# Patient Record
Sex: FEMALE | Race: WHITE | NOT HISPANIC OR LATINO | Employment: OTHER | URBAN - METROPOLITAN AREA
[De-identification: names, ages, dates, MRNs, and addresses within clinical notes are randomized per-mention and may not be internally consistent; named-entity substitution may affect disease eponyms.]

---

## 2017-01-03 ENCOUNTER — HOSPITAL ENCOUNTER (OUTPATIENT)
Dept: RADIOLOGY | Facility: MEDICAL CENTER | Age: 72
Discharge: HOME | End: 2017-01-03
Payer: COMMERCIAL

## 2017-01-03 DIAGNOSIS — Z12.31 ENCOUNTER FOR SCREENING MAMMOGRAM FOR MALIGNANT NEOPLASM OF BREAST: ICD-10-CM

## 2017-01-03 PROCEDURE — G0202 SCR MAMMO BI INCL CAD: HCPCS

## 2017-02-22 ENCOUNTER — ALLSCRIPTS OFFICE VISIT (OUTPATIENT)
Dept: OTHER | Facility: OTHER | Age: 72
End: 2017-02-22

## 2017-02-22 DIAGNOSIS — E78.2 MIXED HYPERLIPIDEMIA: ICD-10-CM

## 2017-02-22 DIAGNOSIS — I10 ESSENTIAL (PRIMARY) HYPERTENSION: ICD-10-CM

## 2017-02-22 DIAGNOSIS — R73.01 IMPAIRED FASTING GLUCOSE: ICD-10-CM

## 2017-02-22 DIAGNOSIS — E55.9 VITAMIN D DEFICIENCY: ICD-10-CM

## 2017-02-22 DIAGNOSIS — N32.81 OVERACTIVE BLADDER: ICD-10-CM

## 2017-02-27 ENCOUNTER — APPOINTMENT (OUTPATIENT)
Dept: LAB | Facility: CLINIC | Age: 72
End: 2017-02-27
Payer: COMMERCIAL

## 2017-02-27 DIAGNOSIS — R73.01 IMPAIRED FASTING GLUCOSE: ICD-10-CM

## 2017-02-27 DIAGNOSIS — N32.81 OVERACTIVE BLADDER: ICD-10-CM

## 2017-02-27 DIAGNOSIS — E78.2 MIXED HYPERLIPIDEMIA: ICD-10-CM

## 2017-02-27 DIAGNOSIS — E55.9 VITAMIN D DEFICIENCY: ICD-10-CM

## 2017-02-27 DIAGNOSIS — I10 ESSENTIAL (PRIMARY) HYPERTENSION: ICD-10-CM

## 2017-02-27 LAB
25(OH)D3 SERPL-MCNC: 24 NG/ML (ref 30–100)
ALBUMIN SERPL BCP-MCNC: 3.8 G/DL (ref 3.5–5)
ALP SERPL-CCNC: 102 U/L (ref 46–116)
ALT SERPL W P-5'-P-CCNC: 78 U/L (ref 12–78)
ANION GAP SERPL CALCULATED.3IONS-SCNC: 8 MMOL/L (ref 4–13)
AST SERPL W P-5'-P-CCNC: 47 U/L (ref 5–45)
BASOPHILS # BLD AUTO: 0.03 THOUSANDS/ΜL (ref 0–0.1)
BASOPHILS NFR BLD AUTO: 0 % (ref 0–1)
BILIRUB SERPL-MCNC: 0.62 MG/DL (ref 0.2–1)
BUN SERPL-MCNC: 16 MG/DL (ref 5–25)
CALCIUM SERPL-MCNC: 9.2 MG/DL (ref 8.3–10.1)
CHLORIDE SERPL-SCNC: 105 MMOL/L (ref 100–108)
CHOLEST SERPL-MCNC: 192 MG/DL (ref 50–200)
CO2 SERPL-SCNC: 27 MMOL/L (ref 21–32)
CREAT SERPL-MCNC: 0.7 MG/DL (ref 0.6–1.3)
EOSINOPHIL # BLD AUTO: 0.3 THOUSAND/ΜL (ref 0–0.61)
EOSINOPHIL NFR BLD AUTO: 4 % (ref 0–6)
ERYTHROCYTE [DISTWIDTH] IN BLOOD BY AUTOMATED COUNT: 14.3 % (ref 11.6–15.1)
EST. AVERAGE GLUCOSE BLD GHB EST-MCNC: 146 MG/DL
GFR SERPL CREATININE-BSD FRML MDRD: >60 ML/MIN/1.73SQ M
GLUCOSE SERPL-MCNC: 130 MG/DL (ref 65–140)
HBA1C MFR BLD: 6.7 % (ref 4.2–6.3)
HCT VFR BLD AUTO: 45.4 % (ref 34.8–46.1)
HDLC SERPL-MCNC: 50 MG/DL (ref 40–60)
HGB BLD-MCNC: 14.7 G/DL (ref 11.5–15.4)
LDLC SERPL CALC-MCNC: 110 MG/DL (ref 0–100)
LYMPHOCYTES # BLD AUTO: 2.97 THOUSANDS/ΜL (ref 0.6–4.47)
LYMPHOCYTES NFR BLD AUTO: 41 % (ref 14–44)
MCH RBC QN AUTO: 30.2 PG (ref 26.8–34.3)
MCHC RBC AUTO-ENTMCNC: 32.4 G/DL (ref 31.4–37.4)
MCV RBC AUTO: 93 FL (ref 82–98)
MONOCYTES # BLD AUTO: 0.8 THOUSAND/ΜL (ref 0.17–1.22)
MONOCYTES NFR BLD AUTO: 11 % (ref 4–12)
NEUTROPHILS # BLD AUTO: 3.11 THOUSANDS/ΜL (ref 1.85–7.62)
NEUTS SEG NFR BLD AUTO: 44 % (ref 43–75)
NRBC BLD AUTO-RTO: 0 /100 WBCS
PLATELET # BLD AUTO: 179 THOUSANDS/UL (ref 149–390)
PMV BLD AUTO: 12.9 FL (ref 8.9–12.7)
POTASSIUM SERPL-SCNC: 4 MMOL/L (ref 3.5–5.3)
PROT SERPL-MCNC: 7.4 G/DL (ref 6.4–8.2)
RBC # BLD AUTO: 4.86 MILLION/UL (ref 3.81–5.12)
SODIUM SERPL-SCNC: 140 MMOL/L (ref 136–145)
TRIGL SERPL-MCNC: 160 MG/DL
TSH SERPL DL<=0.05 MIU/L-ACNC: 2.38 UIU/ML (ref 0.36–3.74)
WBC # BLD AUTO: 7.22 THOUSAND/UL (ref 4.31–10.16)

## 2017-02-27 PROCEDURE — 84443 ASSAY THYROID STIM HORMONE: CPT

## 2017-02-27 PROCEDURE — 83036 HEMOGLOBIN GLYCOSYLATED A1C: CPT

## 2017-02-27 PROCEDURE — 82306 VITAMIN D 25 HYDROXY: CPT

## 2017-02-27 PROCEDURE — 80053 COMPREHEN METABOLIC PANEL: CPT

## 2017-02-27 PROCEDURE — 80061 LIPID PANEL: CPT

## 2017-02-27 PROCEDURE — 85025 COMPLETE CBC W/AUTO DIFF WBC: CPT

## 2017-02-27 PROCEDURE — 36415 COLL VENOUS BLD VENIPUNCTURE: CPT

## 2017-02-28 ENCOUNTER — GENERIC CONVERSION - ENCOUNTER (OUTPATIENT)
Dept: OTHER | Facility: OTHER | Age: 72
End: 2017-02-28

## 2017-04-21 ENCOUNTER — GENERIC CONVERSION - ENCOUNTER (OUTPATIENT)
Dept: OTHER | Facility: OTHER | Age: 72
End: 2017-04-21

## 2017-05-05 ENCOUNTER — GENERIC CONVERSION - ENCOUNTER (OUTPATIENT)
Dept: OTHER | Facility: OTHER | Age: 72
End: 2017-05-05

## 2017-06-07 ENCOUNTER — GENERIC CONVERSION - ENCOUNTER (OUTPATIENT)
Dept: OTHER | Facility: OTHER | Age: 72
End: 2017-06-07

## 2017-06-10 ENCOUNTER — APPOINTMENT (EMERGENCY)
Dept: RADIOLOGY | Facility: HOSPITAL | Age: 72
End: 2017-06-10
Payer: COMMERCIAL

## 2017-06-10 ENCOUNTER — APPOINTMENT (EMERGENCY)
Dept: CT IMAGING | Facility: HOSPITAL | Age: 72
End: 2017-06-10
Payer: COMMERCIAL

## 2017-06-10 ENCOUNTER — HOSPITAL ENCOUNTER (OUTPATIENT)
Facility: HOSPITAL | Age: 72
Setting detail: OBSERVATION
Discharge: HOME | End: 2017-06-11
Attending: EMERGENCY MEDICINE | Admitting: INTERNAL MEDICINE
Payer: COMMERCIAL

## 2017-06-10 DIAGNOSIS — R61 DIAPHORESIS: ICD-10-CM

## 2017-06-10 DIAGNOSIS — I10 HTN (HYPERTENSION): Primary | ICD-10-CM

## 2017-06-10 DIAGNOSIS — M54.2 NECK PAIN: ICD-10-CM

## 2017-06-10 LAB
ALBUMIN SERPL BCP-MCNC: 3.6 G/DL (ref 3.5–5)
ALP SERPL-CCNC: 119 U/L (ref 46–116)
ALT SERPL W P-5'-P-CCNC: 72 U/L (ref 12–78)
ANION GAP SERPL CALCULATED.3IONS-SCNC: 10 MMOL/L (ref 4–13)
APTT PPP: 27 SECONDS (ref 23–35)
AST SERPL W P-5'-P-CCNC: 45 U/L (ref 5–45)
ATRIAL RATE: 87 BPM
BACTERIA UR QL AUTO: ABNORMAL /HPF
BASOPHILS # BLD AUTO: 0.03 THOUSANDS/ΜL (ref 0–0.1)
BASOPHILS NFR BLD AUTO: 1 % (ref 0–1)
BILIRUB SERPL-MCNC: 0.4 MG/DL (ref 0.2–1)
BILIRUB UR QL STRIP: NEGATIVE
BUN SERPL-MCNC: 18 MG/DL (ref 5–25)
CALCIUM SERPL-MCNC: 8.9 MG/DL (ref 8.3–10.1)
CHLORIDE SERPL-SCNC: 108 MMOL/L (ref 100–108)
CK MB SERPL-MCNC: 1 % (ref 0–2.5)
CK MB SERPL-MCNC: 1.5 NG/ML (ref 0–5)
CK SERPL-CCNC: 148 U/L (ref 26–192)
CLARITY UR: CLEAR
CO2 SERPL-SCNC: 26 MMOL/L (ref 21–32)
COLOR UR: ABNORMAL
CREAT SERPL-MCNC: 0.74 MG/DL (ref 0.6–1.3)
EOSINOPHIL # BLD AUTO: 0.18 THOUSAND/ΜL (ref 0–0.61)
EOSINOPHIL NFR BLD AUTO: 3 % (ref 0–6)
ERYTHROCYTE [DISTWIDTH] IN BLOOD BY AUTOMATED COUNT: 14.3 % (ref 11.6–15.1)
GFR SERPL CREATININE-BSD FRML MDRD: >60 ML/MIN/1.73SQ M
GLUCOSE SERPL-MCNC: 162 MG/DL (ref 65–140)
GLUCOSE UR STRIP-MCNC: NEGATIVE MG/DL
HCT VFR BLD AUTO: 43.1 % (ref 34.8–46.1)
HGB BLD-MCNC: 14.1 G/DL (ref 11.5–15.4)
HGB UR QL STRIP.AUTO: NEGATIVE
INR PPP: 0.88 (ref 0.86–1.16)
KETONES UR STRIP-MCNC: NEGATIVE MG/DL
LEUKOCYTE ESTERASE UR QL STRIP: ABNORMAL
LYMPHOCYTES # BLD AUTO: 1.8 THOUSANDS/ΜL (ref 0.6–4.47)
LYMPHOCYTES NFR BLD AUTO: 34 % (ref 14–44)
MCH RBC QN AUTO: 29.7 PG (ref 26.8–34.3)
MCHC RBC AUTO-ENTMCNC: 32.7 G/DL (ref 31.4–37.4)
MCV RBC AUTO: 91 FL (ref 82–98)
MONOCYTES # BLD AUTO: 0.45 THOUSAND/ΜL (ref 0.17–1.22)
MONOCYTES NFR BLD AUTO: 9 % (ref 4–12)
NEUTROPHILS # BLD AUTO: 2.86 THOUSANDS/ΜL (ref 1.85–7.62)
NEUTS SEG NFR BLD AUTO: 53 % (ref 43–75)
NITRITE UR QL STRIP: NEGATIVE
NON-SQ EPI CELLS URNS QL MICRO: ABNORMAL /HPF
P AXIS: 53 DEGREES
PH UR STRIP.AUTO: 5.5 [PH] (ref 4.5–8)
PLATELET # BLD AUTO: 178 THOUSANDS/UL (ref 149–390)
PLATELET # BLD AUTO: 180 THOUSANDS/UL (ref 149–390)
PMV BLD AUTO: 12 FL (ref 8.9–12.7)
PMV BLD AUTO: 12.2 FL (ref 8.9–12.7)
POTASSIUM SERPL-SCNC: 3.6 MMOL/L (ref 3.5–5.3)
PR INTERVAL: 152 MS
PROT SERPL-MCNC: 7.2 G/DL (ref 6.4–8.2)
PROT UR STRIP-MCNC: NEGATIVE MG/DL
PROTHROMBIN TIME: 12.2 SECONDS (ref 12.1–14.4)
QRS AXIS: 19 DEGREES
QRSD INTERVAL: 88 MS
QT INTERVAL: 368 MS
QTC INTERVAL: 442 MS
RBC # BLD AUTO: 4.75 MILLION/UL (ref 3.81–5.12)
RBC #/AREA URNS AUTO: ABNORMAL /HPF
SODIUM SERPL-SCNC: 144 MMOL/L (ref 136–145)
SP GR UR STRIP.AUTO: 1.01 (ref 1–1.03)
T WAVE AXIS: 59 DEGREES
TROPONIN I SERPL-MCNC: <0.02 NG/ML
TSH SERPL DL<=0.05 MIU/L-ACNC: 0.85 UIU/ML (ref 0.36–3.74)
UROBILINOGEN UR QL STRIP.AUTO: 0.2 E.U./DL
VENTRICULAR RATE: 87 BPM
WBC # BLD AUTO: 5.32 THOUSAND/UL (ref 4.31–10.16)
WBC #/AREA URNS AUTO: ABNORMAL /HPF

## 2017-06-10 PROCEDURE — 85049 AUTOMATED PLATELET COUNT: CPT | Performed by: PHYSICIAN ASSISTANT

## 2017-06-10 PROCEDURE — 99285 EMERGENCY DEPT VISIT HI MDM: CPT

## 2017-06-10 PROCEDURE — 84443 ASSAY THYROID STIM HORMONE: CPT | Performed by: PHYSICIAN ASSISTANT

## 2017-06-10 PROCEDURE — 36415 COLL VENOUS BLD VENIPUNCTURE: CPT | Performed by: EMERGENCY MEDICINE

## 2017-06-10 PROCEDURE — 96374 THER/PROPH/DIAG INJ IV PUSH: CPT

## 2017-06-10 PROCEDURE — 96361 HYDRATE IV INFUSION ADD-ON: CPT

## 2017-06-10 PROCEDURE — 85730 THROMBOPLASTIN TIME PARTIAL: CPT | Performed by: EMERGENCY MEDICINE

## 2017-06-10 PROCEDURE — A9270 NON-COVERED ITEM OR SERVICE: HCPCS | Performed by: PHYSICIAN ASSISTANT

## 2017-06-10 PROCEDURE — 80053 COMPREHEN METABOLIC PANEL: CPT | Performed by: EMERGENCY MEDICINE

## 2017-06-10 PROCEDURE — 84484 ASSAY OF TROPONIN QUANT: CPT | Performed by: EMERGENCY MEDICINE

## 2017-06-10 PROCEDURE — 82553 CREATINE MB FRACTION: CPT | Performed by: EMERGENCY MEDICINE

## 2017-06-10 PROCEDURE — 93005 ELECTROCARDIOGRAM TRACING: CPT | Performed by: EMERGENCY MEDICINE

## 2017-06-10 PROCEDURE — 85025 COMPLETE CBC W/AUTO DIFF WBC: CPT | Performed by: EMERGENCY MEDICINE

## 2017-06-10 PROCEDURE — A9270 NON-COVERED ITEM OR SERVICE: HCPCS | Performed by: INTERNAL MEDICINE

## 2017-06-10 PROCEDURE — 81001 URINALYSIS AUTO W/SCOPE: CPT | Performed by: EMERGENCY MEDICINE

## 2017-06-10 PROCEDURE — 71275 CT ANGIOGRAPHY CHEST: CPT

## 2017-06-10 PROCEDURE — 84484 ASSAY OF TROPONIN QUANT: CPT | Performed by: INTERNAL MEDICINE

## 2017-06-10 PROCEDURE — 84484 ASSAY OF TROPONIN QUANT: CPT | Performed by: PHYSICIAN ASSISTANT

## 2017-06-10 PROCEDURE — 70450 CT HEAD/BRAIN W/O DYE: CPT

## 2017-06-10 PROCEDURE — 85610 PROTHROMBIN TIME: CPT | Performed by: EMERGENCY MEDICINE

## 2017-06-10 PROCEDURE — 74175 CTA ABDOMEN W/CONTRAST: CPT

## 2017-06-10 PROCEDURE — 82550 ASSAY OF CK (CPK): CPT | Performed by: EMERGENCY MEDICINE

## 2017-06-10 PROCEDURE — 71020 HB CHEST X-RAY 2VW FRONTAL&LATL: CPT

## 2017-06-10 RX ORDER — OXYBUTYNIN CHLORIDE 5 MG/1
5 TABLET, EXTENDED RELEASE ORAL DAILY
Status: DISCONTINUED | OUTPATIENT
Start: 2017-06-10 | End: 2017-06-11 | Stop reason: HOSPADM

## 2017-06-10 RX ORDER — ACETAMINOPHEN 325 MG/1
650 TABLET ORAL EVERY 6 HOURS PRN
Status: DISCONTINUED | OUTPATIENT
Start: 2017-06-10 | End: 2017-06-11 | Stop reason: HOSPADM

## 2017-06-10 RX ORDER — HYDRALAZINE HYDROCHLORIDE 20 MG/ML
10 INJECTION INTRAMUSCULAR; INTRAVENOUS EVERY 6 HOURS PRN
Status: DISCONTINUED | OUTPATIENT
Start: 2017-06-10 | End: 2017-06-11 | Stop reason: HOSPADM

## 2017-06-10 RX ORDER — AMLODIPINE BESYLATE 5 MG/1
5 TABLET ORAL EVERY EVENING
Status: DISCONTINUED | OUTPATIENT
Start: 2017-06-10 | End: 2017-06-11 | Stop reason: HOSPADM

## 2017-06-10 RX ORDER — OXYBUTYNIN CHLORIDE 5 MG/1
5 TABLET, EXTENDED RELEASE ORAL DAILY
COMMUNITY
End: 2018-06-03 | Stop reason: SDUPTHER

## 2017-06-10 RX ORDER — NITROGLYCERIN 0.4 MG/1
0.4 TABLET SUBLINGUAL
Status: DISCONTINUED | OUTPATIENT
Start: 2017-06-10 | End: 2017-06-11 | Stop reason: HOSPADM

## 2017-06-10 RX ORDER — ATORVASTATIN CALCIUM 40 MG/1
40 TABLET, FILM COATED ORAL DAILY
Status: DISCONTINUED | OUTPATIENT
Start: 2017-06-10 | End: 2017-06-11 | Stop reason: HOSPADM

## 2017-06-10 RX ORDER — MELATONIN
2000 DAILY
Status: DISCONTINUED | OUTPATIENT
Start: 2017-06-10 | End: 2017-06-11 | Stop reason: HOSPADM

## 2017-06-10 RX ORDER — OMEPRAZOLE 20 MG/1
20 CAPSULE, DELAYED RELEASE ORAL DAILY
COMMUNITY
End: 2018-07-18 | Stop reason: CLARIF

## 2017-06-10 RX ORDER — METOPROLOL SUCCINATE 50 MG/1
50 TABLET, EXTENDED RELEASE ORAL DAILY
COMMUNITY
End: 2018-02-23 | Stop reason: SDUPTHER

## 2017-06-10 RX ORDER — MELATONIN
2000 DAILY
COMMUNITY

## 2017-06-10 RX ORDER — ASPIRIN 81 MG/1
81 TABLET, CHEWABLE ORAL DAILY
COMMUNITY
End: 2018-07-17 | Stop reason: CLARIF

## 2017-06-10 RX ORDER — ATORVASTATIN CALCIUM 40 MG/1
40 TABLET, FILM COATED ORAL DAILY
COMMUNITY
End: 2018-02-23 | Stop reason: SDUPTHER

## 2017-06-10 RX ORDER — ASPIRIN 81 MG/1
81 TABLET, CHEWABLE ORAL DAILY
Status: DISCONTINUED | OUTPATIENT
Start: 2017-06-10 | End: 2017-06-11 | Stop reason: HOSPADM

## 2017-06-10 RX ORDER — PANTOPRAZOLE SODIUM 40 MG/1
40 TABLET, DELAYED RELEASE ORAL
Status: DISCONTINUED | OUTPATIENT
Start: 2017-06-10 | End: 2017-06-11 | Stop reason: HOSPADM

## 2017-06-10 RX ORDER — MULTIVITAMIN
1 TABLET ORAL DAILY
COMMUNITY

## 2017-06-10 RX ORDER — LABETALOL HYDROCHLORIDE 5 MG/ML
10 INJECTION, SOLUTION INTRAVENOUS ONCE
Status: COMPLETED | OUTPATIENT
Start: 2017-06-10 | End: 2017-06-10

## 2017-06-10 RX ORDER — ONDANSETRON 2 MG/ML
4 INJECTION INTRAMUSCULAR; INTRAVENOUS EVERY 6 HOURS PRN
Status: DISCONTINUED | OUTPATIENT
Start: 2017-06-10 | End: 2017-06-11 | Stop reason: HOSPADM

## 2017-06-10 RX ORDER — METOPROLOL SUCCINATE 50 MG/1
50 TABLET, EXTENDED RELEASE ORAL DAILY
Status: DISCONTINUED | OUTPATIENT
Start: 2017-06-10 | End: 2017-06-11 | Stop reason: HOSPADM

## 2017-06-10 RX ORDER — LABETALOL HYDROCHLORIDE 5 MG/ML
10 INJECTION, SOLUTION INTRAVENOUS EVERY 4 HOURS PRN
Status: DISCONTINUED | OUTPATIENT
Start: 2017-06-10 | End: 2017-06-11 | Stop reason: HOSPADM

## 2017-06-10 RX ADMIN — AMLODIPINE BESYLATE 5 MG: 5 TABLET ORAL at 19:48

## 2017-06-10 RX ADMIN — ATORVASTATIN CALCIUM 40 MG: 40 TABLET, FILM COATED ORAL at 09:55

## 2017-06-10 RX ADMIN — PANTOPRAZOLE SODIUM 40 MG: 40 TABLET, DELAYED RELEASE ORAL at 09:55

## 2017-06-10 RX ADMIN — SODIUM CHLORIDE 500 ML: 0.9 INJECTION, SOLUTION INTRAVENOUS at 05:49

## 2017-06-10 RX ADMIN — ENOXAPARIN SODIUM 40 MG: 40 INJECTION SUBCUTANEOUS at 09:56

## 2017-06-10 RX ADMIN — ASPIRIN 81 MG 81 MG: 81 TABLET ORAL at 09:55

## 2017-06-10 RX ADMIN — METOPROLOL SUCCINATE 50 MG: 50 TABLET, EXTENDED RELEASE ORAL at 09:55

## 2017-06-10 RX ADMIN — IOHEXOL 100 ML: 350 INJECTION, SOLUTION INTRAVENOUS at 06:11

## 2017-06-10 RX ADMIN — LABETALOL HYDROCHLORIDE 10 MG: 5 INJECTION, SOLUTION INTRAVENOUS at 05:05

## 2017-06-10 RX ADMIN — CHOLECALCIFEROL TAB 25 MCG (1000 UNIT) 2000 UNITS: 25 TAB at 09:55

## 2017-06-10 RX ADMIN — OXYBUTYNIN CHLORIDE 5 MG: 5 TABLET, EXTENDED RELEASE ORAL at 09:56

## 2017-06-11 VITALS
DIASTOLIC BLOOD PRESSURE: 72 MMHG | TEMPERATURE: 98.6 F | RESPIRATION RATE: 20 BRPM | SYSTOLIC BLOOD PRESSURE: 144 MMHG | HEART RATE: 79 BPM | BODY MASS INDEX: 31.24 KG/M2 | WEIGHT: 154.98 LBS | HEIGHT: 59 IN | OXYGEN SATURATION: 95 %

## 2017-06-11 LAB
ANION GAP SERPL CALCULATED.3IONS-SCNC: 9 MMOL/L (ref 4–13)
BUN SERPL-MCNC: 14 MG/DL (ref 5–25)
CALCIUM SERPL-MCNC: 8.9 MG/DL (ref 8.3–10.1)
CHLORIDE SERPL-SCNC: 105 MMOL/L (ref 100–108)
CHOLEST SERPL-MCNC: 168 MG/DL (ref 50–200)
CO2 SERPL-SCNC: 26 MMOL/L (ref 21–32)
CREAT SERPL-MCNC: 0.61 MG/DL (ref 0.6–1.3)
ERYTHROCYTE [DISTWIDTH] IN BLOOD BY AUTOMATED COUNT: 14.5 % (ref 11.6–15.1)
GFR SERPL CREATININE-BSD FRML MDRD: >60 ML/MIN/1.73SQ M
GLUCOSE P FAST SERPL-MCNC: 121 MG/DL (ref 65–99)
GLUCOSE SERPL-MCNC: 121 MG/DL (ref 65–140)
HCT VFR BLD AUTO: 41.4 % (ref 34.8–46.1)
HDLC SERPL-MCNC: 43 MG/DL (ref 40–60)
HGB BLD-MCNC: 13.5 G/DL (ref 11.5–15.4)
LDLC SERPL CALC-MCNC: 98 MG/DL (ref 0–100)
MCH RBC QN AUTO: 29.8 PG (ref 26.8–34.3)
MCHC RBC AUTO-ENTMCNC: 32.6 G/DL (ref 31.4–37.4)
MCV RBC AUTO: 91 FL (ref 82–98)
PLATELET # BLD AUTO: 145 THOUSANDS/UL (ref 149–390)
PMV BLD AUTO: 12 FL (ref 8.9–12.7)
POTASSIUM SERPL-SCNC: 3.7 MMOL/L (ref 3.5–5.3)
RBC # BLD AUTO: 4.53 MILLION/UL (ref 3.81–5.12)
SODIUM SERPL-SCNC: 140 MMOL/L (ref 136–145)
TRIGL SERPL-MCNC: 136 MG/DL
WBC # BLD AUTO: 5.66 THOUSAND/UL (ref 4.31–10.16)

## 2017-06-11 PROCEDURE — A9270 NON-COVERED ITEM OR SERVICE: HCPCS | Performed by: PHYSICIAN ASSISTANT

## 2017-06-11 PROCEDURE — 85027 COMPLETE CBC AUTOMATED: CPT | Performed by: PHYSICIAN ASSISTANT

## 2017-06-11 PROCEDURE — 80048 BASIC METABOLIC PNL TOTAL CA: CPT | Performed by: PHYSICIAN ASSISTANT

## 2017-06-11 PROCEDURE — 80061 LIPID PANEL: CPT | Performed by: PHYSICIAN ASSISTANT

## 2017-06-11 RX ORDER — AMLODIPINE BESYLATE 5 MG/1
5 TABLET ORAL EVERY EVENING
Qty: 30 TABLET | Refills: 0 | Status: SHIPPED | OUTPATIENT
Start: 2017-06-11 | End: 2018-02-23 | Stop reason: SDUPTHER

## 2017-06-11 RX ADMIN — ATORVASTATIN CALCIUM 40 MG: 40 TABLET, FILM COATED ORAL at 08:10

## 2017-06-11 RX ADMIN — PANTOPRAZOLE SODIUM 40 MG: 40 TABLET, DELAYED RELEASE ORAL at 05:37

## 2017-06-11 RX ADMIN — METOPROLOL SUCCINATE 50 MG: 50 TABLET, EXTENDED RELEASE ORAL at 08:11

## 2017-06-11 RX ADMIN — ASPIRIN 81 MG 81 MG: 81 TABLET ORAL at 08:10

## 2017-06-11 RX ADMIN — OXYBUTYNIN CHLORIDE 5 MG: 5 TABLET, EXTENDED RELEASE ORAL at 08:11

## 2017-06-11 RX ADMIN — CHOLECALCIFEROL TAB 25 MCG (1000 UNIT) 2000 UNITS: 25 TAB at 08:10

## 2017-06-12 ENCOUNTER — HOSPITAL ENCOUNTER (EMERGENCY)
Facility: HOSPITAL | Age: 72
Discharge: HOME | End: 2017-06-13
Attending: EMERGENCY MEDICINE | Admitting: EMERGENCY MEDICINE
Payer: COMMERCIAL

## 2017-06-12 DIAGNOSIS — I10 CHRONIC HYPERTENSION: Primary | ICD-10-CM

## 2017-06-13 VITALS
BODY MASS INDEX: 32.87 KG/M2 | DIASTOLIC BLOOD PRESSURE: 85 MMHG | RESPIRATION RATE: 18 BRPM | WEIGHT: 160 LBS | TEMPERATURE: 98.2 F | HEART RATE: 68 BPM | OXYGEN SATURATION: 96 % | SYSTOLIC BLOOD PRESSURE: 144 MMHG

## 2017-06-13 LAB
ATRIAL RATE: 76 BPM
P AXIS: 56 DEGREES
PR INTERVAL: 148 MS
QRS AXIS: 2 DEGREES
QRSD INTERVAL: 90 MS
QT INTERVAL: 374 MS
QTC INTERVAL: 420 MS
T WAVE AXIS: 13 DEGREES
VENTRICULAR RATE: 76 BPM

## 2017-06-13 PROCEDURE — 99283 EMERGENCY DEPT VISIT LOW MDM: CPT

## 2017-06-13 PROCEDURE — A9270 NON-COVERED ITEM OR SERVICE: HCPCS | Performed by: EMERGENCY MEDICINE

## 2017-06-13 PROCEDURE — 93005 ELECTROCARDIOGRAM TRACING: CPT | Performed by: EMERGENCY MEDICINE

## 2017-06-13 RX ORDER — AMLODIPINE BESYLATE 5 MG/1
5 TABLET ORAL ONCE
Status: COMPLETED | OUTPATIENT
Start: 2017-06-13 | End: 2017-06-13

## 2017-06-13 RX ADMIN — AMLODIPINE BESYLATE 5 MG: 5 TABLET ORAL at 00:35

## 2017-06-16 ENCOUNTER — TRANSCRIBE ORDERS (OUTPATIENT)
Dept: ADMINISTRATIVE | Facility: HOSPITAL | Age: 72
End: 2017-06-16

## 2017-06-16 ENCOUNTER — ALLSCRIPTS OFFICE VISIT (OUTPATIENT)
Dept: OTHER | Facility: OTHER | Age: 72
End: 2017-06-16

## 2017-06-16 DIAGNOSIS — I10 ESSENTIAL (PRIMARY) HYPERTENSION: ICD-10-CM

## 2017-06-16 DIAGNOSIS — Z13.820 ENCOUNTER FOR SCREENING FOR OSTEOPOROSIS: ICD-10-CM

## 2017-06-16 DIAGNOSIS — R06.09 OTHER FORMS OF DYSPNEA: ICD-10-CM

## 2017-06-16 DIAGNOSIS — I10 ESSENTIAL HYPERTENSION, MALIGNANT: Primary | ICD-10-CM

## 2017-07-07 ENCOUNTER — GENERIC CONVERSION - ENCOUNTER (OUTPATIENT)
Dept: OTHER | Facility: OTHER | Age: 72
End: 2017-07-07

## 2017-07-07 ENCOUNTER — HOSPITAL ENCOUNTER (OUTPATIENT)
Dept: RADIOLOGY | Age: 72
Discharge: HOME | End: 2017-07-07
Payer: COMMERCIAL

## 2017-07-07 DIAGNOSIS — Z13.820 ENCOUNTER FOR SCREENING FOR OSTEOPOROSIS: ICD-10-CM

## 2017-07-07 PROCEDURE — 77080 DXA BONE DENSITY AXIAL: CPT

## 2017-07-17 ENCOUNTER — HOSPITAL ENCOUNTER (OUTPATIENT)
Dept: NON INVASIVE DIAGNOSTICS | Facility: CLINIC | Age: 72
Discharge: HOME | End: 2017-07-17
Payer: COMMERCIAL

## 2017-07-17 DIAGNOSIS — R06.09 OTHER FORMS OF DYSPNEA: ICD-10-CM

## 2017-07-17 DIAGNOSIS — I10 ESSENTIAL (PRIMARY) HYPERTENSION: ICD-10-CM

## 2017-07-17 LAB
ARRHY DURING EX: NORMAL
CHEST PAIN STATEMENT: NORMAL
MAX DIASTOLIC BP: 70 MMHG
MAX HEART RATE: 137 BPM
MAX PREDICTED HEART RATE: 149 BPM
MAX. SYSTOLIC BP: 164 MMHG
PROTOCOL NAME: NORMAL
TARGET HR FORMULA: NORMAL
TEST INDICATION: NORMAL
TIME IN EXERCISE PHASE: 419 S

## 2017-07-17 PROCEDURE — 93975 VASCULAR STUDY: CPT

## 2017-07-17 PROCEDURE — 93350 STRESS TTE ONLY: CPT

## 2017-08-02 ENCOUNTER — GENERIC CONVERSION - ENCOUNTER (OUTPATIENT)
Dept: OTHER | Facility: OTHER | Age: 72
End: 2017-08-02

## 2017-08-23 ENCOUNTER — ALLSCRIPTS OFFICE VISIT (OUTPATIENT)
Dept: OTHER | Facility: OTHER | Age: 72
End: 2017-08-23

## 2017-08-23 DIAGNOSIS — N32.81 OVERACTIVE BLADDER: ICD-10-CM

## 2017-08-23 DIAGNOSIS — E78.2 MIXED HYPERLIPIDEMIA: ICD-10-CM

## 2017-08-23 DIAGNOSIS — K21.9 GASTRO-ESOPHAGEAL REFLUX DISEASE WITHOUT ESOPHAGITIS: ICD-10-CM

## 2017-08-23 DIAGNOSIS — I10 ESSENTIAL (PRIMARY) HYPERTENSION: ICD-10-CM

## 2017-08-23 DIAGNOSIS — E55.9 VITAMIN D DEFICIENCY: ICD-10-CM

## 2017-08-23 DIAGNOSIS — E11.9 TYPE 2 DIABETES MELLITUS WITHOUT COMPLICATIONS (HCC): ICD-10-CM

## 2017-09-12 ENCOUNTER — GENERIC CONVERSION - ENCOUNTER (OUTPATIENT)
Dept: OTHER | Facility: OTHER | Age: 72
End: 2017-09-12

## 2017-11-06 ENCOUNTER — TRANSCRIBE ORDERS (OUTPATIENT)
Dept: ADMINISTRATIVE | Facility: HOSPITAL | Age: 72
End: 2017-11-06

## 2017-11-06 DIAGNOSIS — H91.91 HEARING LOSS OF RIGHT EAR, UNSPECIFIED HEARING LOSS TYPE: Primary | ICD-10-CM

## 2017-11-07 ENCOUNTER — APPOINTMENT (OUTPATIENT)
Dept: LAB | Facility: CLINIC | Age: 72
End: 2017-11-07
Payer: COMMERCIAL

## 2017-11-07 DIAGNOSIS — H91.91 HEARING LOSS OF RIGHT EAR, UNSPECIFIED HEARING LOSS TYPE: ICD-10-CM

## 2017-11-07 LAB
BUN SERPL-MCNC: 20 MG/DL (ref 5–25)
CREAT SERPL-MCNC: 0.79 MG/DL (ref 0.6–1.3)
GFR SERPL CREATININE-BSD FRML MDRD: 76 ML/MIN/1.73SQ M

## 2017-11-07 PROCEDURE — 82565 ASSAY OF CREATININE: CPT

## 2017-11-07 PROCEDURE — 36415 COLL VENOUS BLD VENIPUNCTURE: CPT

## 2017-11-07 PROCEDURE — 84520 ASSAY OF UREA NITROGEN: CPT

## 2017-11-20 ENCOUNTER — HOSPITAL ENCOUNTER (OUTPATIENT)
Dept: MRI IMAGING | Facility: HOSPITAL | Age: 72
Discharge: HOME | End: 2017-11-20
Attending: OTOLARYNGOLOGY
Payer: COMMERCIAL

## 2017-11-20 DIAGNOSIS — H91.91 HEARING LOSS OF RIGHT EAR, UNSPECIFIED HEARING LOSS TYPE: ICD-10-CM

## 2017-11-20 PROCEDURE — 70553 MRI BRAIN STEM W/O & W/DYE: CPT

## 2017-11-20 PROCEDURE — A9585 GADOBUTROL INJECTION: HCPCS | Performed by: OTOLARYNGOLOGY

## 2017-11-20 RX ADMIN — GADOBUTROL 7 ML: 604.72 INJECTION INTRAVENOUS at 22:54

## 2017-12-05 ENCOUNTER — TRANSCRIBE ORDERS (OUTPATIENT)
Dept: LAB | Facility: CLINIC | Age: 72
End: 2017-12-05

## 2017-12-05 ENCOUNTER — APPOINTMENT (OUTPATIENT)
Dept: LAB | Facility: CLINIC | Age: 72
End: 2017-12-05
Payer: COMMERCIAL

## 2017-12-05 DIAGNOSIS — H83.3X1 NOISE-INDUCED HEARING LOSS OF RIGHT EAR WITH RESTRICTED HEARING OF LEFT EAR: Primary | ICD-10-CM

## 2017-12-05 DIAGNOSIS — H83.3X1 NOISE-INDUCED HEARING LOSS OF RIGHT EAR WITH RESTRICTED HEARING OF LEFT EAR: ICD-10-CM

## 2017-12-05 LAB — ERYTHROCYTE [SEDIMENTATION RATE] IN BLOOD: 23 MM/HOUR (ref 0–20)

## 2017-12-05 PROCEDURE — 36415 COLL VENOUS BLD VENIPUNCTURE: CPT

## 2017-12-05 PROCEDURE — 86618 LYME DISEASE ANTIBODY: CPT

## 2017-12-05 PROCEDURE — 84181 WESTERN BLOT TEST: CPT

## 2017-12-05 PROCEDURE — 84182 PROTEIN WESTERN BLOT TEST: CPT

## 2017-12-05 PROCEDURE — 85652 RBC SED RATE AUTOMATED: CPT

## 2017-12-05 PROCEDURE — 86780 TREPONEMA PALLIDUM: CPT

## 2017-12-06 ENCOUNTER — ALLSCRIPTS OFFICE VISIT (OUTPATIENT)
Dept: OTHER | Facility: OTHER | Age: 72
End: 2017-12-06

## 2017-12-06 LAB — T PALLIDUM AB SER QL IF: NON REACTIVE

## 2017-12-07 LAB
B BURGDOR IGG SER IA-ACNC: 0.08
B BURGDOR IGM SER IA-ACNC: 0.11

## 2017-12-07 NOTE — PROGRESS NOTES
Assessment    1  Meningioma (225 2) (D32 9)   2  Sudden-onset sensorineural hearing loss (388 2) (H91 20)   3  Diabetes mellitus, type 2 (250 00) (E11 9)   4  Essential hypertension (401 9) (I10)   5  Mixed hyperlipidemia (272 2) (E78 2)   6  Gastroesophageal reflux disease (530 81) (K21 9)   7  Vitamin D deficiency (268 9) (E55 9)    Plan  Essential hypertension    · We encourage you to begin to make lifestyle changes to help control your bloodpressure  These may include losing weight, increasing your activity level, limiting salt inyour diet, decreasing alcohol intake, and eating a diet low in fat and rich in fruitsand vegetables ; Status:Complete;   Done: 13QNF7170  Meningioma    · *1 - [facility]  *  Status: Active  Requested for: 86MXL4245  Care Summary provided  : Yes    Discussion/Summary    Long discussion with patient and her daughters  She is scheduled to see ENT in follow-up  Could consider vestibular therapy  Neuro surgical referral  monitor blood pressures  History of Present Illness  follow up visit  here with her 2 daughters  Recent ENT evaluation for sudden hearing loss right ear  she has had intermittent tenderness with dizziness described as feeling off balance  no vertigo  No headaches  No nausea or vomiting  MRI brain and IACs showed no evidence acoustic neuroma  small retention cyst left maxillary sinus  1 5 frontal meningioma noted  ESR 23  medications reviewed  hypertension BP have been stable on current regimen  Amlodipine 5 mg daily and Metoprolol ER 50 mg/day  07/2017 Renal artery Doppler showed no evidence renal artery stenosis  s/p admission 06/2017 for accelerated hypertension   range  Evaluation emergency room  EKG normal sinus rhythm with nonspecific T-wave abnormalities  Troponin Ã3 normal  CTA showed no evidence of aortic dissection  No aortic aneurysm  No pulmonary embolus  Coronary artery calcifications noted  Mild cardiomegaly   Suspected benign cortical renal cysts  CT brain no acute abnormalities  Moderate microangiopathic changes  Atherosclerotic carotid and vertebral artery calcifications  Chest x-ray no active disease Lipid profile cholesterol 168  Triglycerides 136  HDL 43  LDL 98  CBC normal except for platelet count 619,886  WBC 5660  Hemoglobin 13 5  Nonfasting basic profile blood glucose 121  Creatinine 0 61  Electrolytes normal  LFTs normal except for alkaline phosphatase 119 TSH 0 846 UA no proteinuria  Trace leukocytes  out patient labs 02/2017 see note  type 2 DM diet controlled   A1c 6 7  + FH type 2 DM brother hyperlipidemia lipid profile 02/2017 cholesterol 192  TGs 160  HDL 50    on Atorvastatin 40 mg and fish oil capsules  LFTs normal except for AST 47  TSH 2 380  Review of Systems   Constitutional: 6 lb weight loss from 02/2017, but-- no recent weight gain-- and-- no recent weight loss  Eyes: no eyesight problems  ENT: 08/2016 ENT evaluation for hoarseness she was diagnosed with GERD improved on Omeprazole  , but-- no sore throat-- and-- no hoarseness  Cardiovascular: 07/2017 Stress echocardiogram no stress-induced ischemia  No regional wall motion abnormalities  EF 60%  , but-- no chest pain,-- no intermittent leg claudication,-- no palpitations-- and-- no lower extremity edema  Respiratory: as noted in HPI,-- no cough,-- no orthopnea,-- no wheezing,-- no shortness of breath during exertion-- and-- no PND  Gastrointestinal: no abdominal pain,-- no nausea,-- no constipation,-- no diarrhea-- and-- no blood in stools  Genitourinary: OAB/urinary frequency stable on Vesicare, but-- no dysuria-- and-- no incontinence  Musculoskeletal: arthralgias-- and-- advanced OA left knee  she was scheduled for left TKR  this was put on hold  recent steroid injection  , but-- no myalgias  Integumentary: no rashes-- and-- no skin lesions  Neurological: no falls, but-- no headache-- and-- no dizziness    Endocrine: vitamin D deficiency  on vitamin D 1,000 IU daily  2016 vitamin D level 35 7  2017 low bone mass on Ca++ and vitamin D  Active Problems  1  Advance directive discussed with patient (V65 49) (Z71 89)   2  Dermatitis (692 9) (L30 9)   3  Diabetes mellitus, type 2 (250 00) (E11 9)   4  Essential hypertension (401 9) (I10)   5  Gastroesophageal reflux disease (530 81) (K21 9)   6  Low bone mass (733 90) (M85 80)   7  Mixed hyperlipidemia (272 2) (E78 2)   8  OAB (overactive bladder) (596 51) (N32 81)   9  Rosacea (695 3) (L71 9)   10  Vitamin D deficiency (268 9) (E55 9)    Past Medical History  1  History of Actinic keratosis (702 0) (L57 0)   2  History of Benign Polyps Of The Large Intestine (V12 72)   3  History of Endometriosis (617 9) (N80 9)   4  History of Eustachian tube dysfunction, bilateral (381 81) (H69 83)   5  History of  4   6  History of colonic polyps (V12 72) (Z86 010)   7  History of malignant hypertension (V12 59) (Z86 79)   8  History of Impaired fasting glucose (790 21) (R73 01)   9  History of  (spontaneous vaginal delivery) (650) (O80)    Surgical History  1  History of Complete Colonoscopy   2  History of Hallux Valgus (Bunion) Correction   3  History of Knee Arthroscopy (Therapeutic)   4  History of Total Abdominal Hysterectomy With Removal Of Both Ovaries    Family History  Mother    1  Family history of Acute Myocardial Infarction (V17 3)  Sister    2  Family history of CABG   3  Family history of Coronary Artery Disease (V17 49)   4  Family history of Coronary Artery Disease (V17 49)  Brother    5  Family history of Diabetes Mellitus (V18 0)    Social History     · Daily Coffee Consumption (2  Cups/Day)   · Exercising Regularly   · Four children   · Never A Smoker    Current Meds   1  AmLODIPine Besylate 5 MG Oral Tablet; TAKE 1 TABLET DAILY  Requested for: 57DJX2004; Last Rx:2017 Ordered   2  Aspirin 81 MG Oral Tablet Delayed Release Recorded   3   Atorvastatin Calcium 40 MG Oral Tablet; take 1 tablet by mouth once daily; Therapy: 43YSC4625 to (Evaluate:01Jan2018)  Requested for: 18FFN6654; Last Rx:06Jan2017 Ordered   4  Calcium 600 TABS; Therapy: (Elsworth Mahmood) to Recorded   5  [employer] Fish Oil CAPS Recorded   6  Metoprolol Succinate ER 50 MG Oral Tablet Extended Release 24 Hour; take 1 tablet by mouth once daily; Therapy: 95Yyw7346 to (Evaluate:46Xru0731)  Requested for: 45NCS7916; Last Rx:55Ham6246 Ordered   7  MetroNIDAZOLE 0 75 % External Cream; APPLY AND GENTLY MASSAGE INTO AFFECTED AREA(S) TWICE DAILY; Therapy: 85Kqw6988 to (Last Rx:49Lnt1535)  Requested for: 72Oey8773 Ordered   8  Multi-Vitamin Daily Oral Tablet Recorded   9  Omeprazole 20 MG Oral Capsule Delayed Release; Therapy: (Recorded:30Nov2016) to Recorded   10  Oxybutynin Chloride ER 5 MG Oral Tablet Extended Release 24 Hour; take 1 tablet by  mouth once daily; Therapy: 43VYZ6175 to (DVSNXTIC:05BTE3111)  Requested for: 04PMP5785; Last  Rx:50Rky1569 Ordered   11  Vitamin D 1000 UNIT Oral Tablet; TAKE 1 TABLET DAILY; Therapy: 77WBK5703 to (Evaluate:91Zyg3216); Last Rx:22Apr2014 Ordered    Allergies  1  Penicillins    Vitals  Vital Signs    Recorded: 36FRV2047 01:33PM   Temperature 98 53 F   Heart Rate 76   Respiration 16   Systolic 057, LUE, Sitting   Diastolic 80, LUE, Sitting   BP CUFF SIZE Large   Height 4 ft 8 in   Weight 149 lb    BMI Calculated 33 41   BSA Calculated 1 57       Physical Exam   Constitutional  General appearance: No acute distress, well appearing and well nourished  Eyes  Conjunctiva and lids: No swelling, erythema or discharge  -- EOMI  no nystagmus  Pupils and irises: Equal, round, reactive to light  Ophthalmoscopic examination: Normal fundi and optic discs  Ears, Nose, Mouth, and Throat  Otoscopic examination: Tympanic membranes translucent with normal light reflex  Canals patent without erythema  Hearing: Abnormal  -- loss of hearing on right to rubbing fingers    Lips, teeth, and gums: Abnormal   Examination of the teeth revealed upper dentures  Oropharynx: Normal with no erythema, edema, exudate or lesions  Neck  Neck: Supple, symmetric, trachea midline, no masses  Thyroid: Normal, no thyromegaly  There were no thyroid nodules  Pulmonary  Auscultation of lungs: Clear to auscultation  Cardiovascular  Auscultation of heart: Normal rate and rhythm, normal S1 and S2, no murmurs  Heart sounds: no gallop heard  Carotid pulses: 2+ bilaterally  no bruit heard over the right carotid-- and-- no bruit heard over the left carotid  Examination of extremities for edema and/or varicosities: Normal    Abdomen  Abdomen: Non-tender, no masses  Liver and spleen: No hepatomegaly or splenomegaly  Lymphatic  Palpation of lymph nodes in neck: No lymphadenopathy  no anterior cervical node enlargement,-- no posterior cervical node enlargement,-- no submandibular node enlargement-- and-- no supraclavicular node enlargement  Musculoskeletal  Gait and station: Normal    Muscle strength/tone: Normal   Motor Strength Findings: no pronator drift on the right,-- no pronator drift on the left,-- normal upper extremity strength-- and-- normal lower extremity strength  Skin  Skin and subcutaneous tissue: Normal without rashes or lesions  Neurologic  Cranial nerves: Cranial nerves II-XII intact  -- negative Rhomberg  Reflexes: 2+ and symmetric  Deep tendon reflexes: no ankle clonus on the right-- and-- no ankle clonus on the left  Sensation: No sensory loss  Psychiatric  Recent and remote memory: Intact     Mood and affect: Normal        Results/Data  (1) SED RATE [id number] 07:28AM EPIC, Provider   Test ordered by: Chica Sleet     Test Name Result Flag Reference   SED RATE 23 mm/hour H 0-20     * MRI BRAIN W WO CONTRAST [id number] 09:03PM EPIC, Provider   Test ordered by: Chica Sleet     Test Name Result Flag Reference   MRI BRAIN W WO CONTRAST (Report)       MRI BRAIN AND IAC'S - WITH AND WITHOUT CONTRAST   INDICATION: Right-sided hearing loss and tinnitus  COMPARISON: None  TECHNIQUE:  Brain:  Sagittal T1, axial T2, axial [place] and gradient, axial T1, axial FLAIR, axial diffusion imaging  Axial T1 postcontrast  Axial BRAVO post contrast      IAC'S: Coronal FIESTA, coronal T1 postcontrast, axial T1 postcontrast with fat suppression  Targeted images of the IAC'S were performed requiring additional time at acquisition and interpretation of approximately 25%   IV Contrast: 7 mL of Gadobutrol injection (SINGLE-DOSE)    IMAGE QUALITY:  Diagnostic  FINDINGS:   BRAIN PARENCHYMA: Scattered white matter lesions suggestive of chronic microangiopathy  Diffusion imaging is unremarkable with no evidence of acute ischemia  No acute or chronic hemorrhage  Normal corpus callosum and hypothalamus  There is a small dural based extra-axial mass within the left anterior medial frontal region demonstrating homogeneous enhancement consistent with meningioma  This is best seen on series 14, image 13 measuring 1 4 x 0 7 cm with slight mass effect upon   the adjacent brain parenchyma but no adjacent edema  IAC'S: No CP angle mass or abnormal enhancement  Normal aeration of the mastoid air cells and middle ear cavity  VENTRICLES: Normal    SELLA AND PITUITARY GLAND: Normal    ORBITS: Normal    PARANASAL SINUSES: Small retention cyst within the inferior aspect of the left maxillary sinus  VASCULATURE: Evaluation of the major intracranial vasculature demonstrates appropriate flow voids  CALVARIUM AND SKULL BASE: Normal    EXTRACRANIAL SOFT TISSUES: Normal     IMPRESSION:   Mild scattered white matter change within the cerebral hemispheres consistent with chronic microangiopathy with no evidence of acute ischemia or hemorrhage  1 5 cm left frontal meningioma, series 14 image 13  Nonurgent neurosurgical follow-up recommended      Workstation performed: [id number]   Signed by:  Romina Meneses DO  11/22/17     (1) HALLE [id number] 10:20AM EPIC, Provider   Test ordered by: Yolanda [place]     Test Name Result Flag Reference   BLOOD UREA NITROGEN 20 mg/dL  5-25     (1) CREATININE 78WDF9172 10:20AM EPIC, Provider   Test ordered by: Yolanda [place]     Test Name Result Flag Reference   CREATININE 0 79 mg/dL  0 60-1 30   Standardized to IDMS reference method   eGFR 76 ml/min/1 73sq m       National Kidney Disease Education Program recommendations are as follows: GFR calculation is accurate only with a steady state creatinine Chronic Kidney disease less than 60 ml/min/1 73 sq  meters Kidney failure less than 15 ml/min/1 73 sq  meters  ECHO STRESS TEST W CONTRAST IF INDICATED 78RYL3547 01:43PM Sydnee Ramos Order Number: [id number]  Performing Comments: + coronary artery calcifications  FH CAD mother  from MI age 48   - Patient Instructions: To schedule this appointment, please contact Central Scheduling at [phone]  Test Name Result Flag Reference   ECHO STRESS TEST W CONTRAST IF INDICATED (Report)       [address]  [phone]   Exercise Stress Echocardiography   Study date: 2017   Patient: Dacia Alfonso  MR number: [id number]  Account number: [de-identified]  : [id number]  Age: 70 years  Gender: Female  Study date: 2017  Status: Outpatient  Location: [facility] lab  Height: 57 in  Weight: 150 lb  BP: 122// 70 mmHg   Indications: Detection of coronary artery disease  Diagnosis: R06 09 - Other forms of dyspnea   Sonographer: Lucia Jeans, RCS  Primary Physician: Ken Estrada MD  Referring Physician: Ken Estrada MD  Group: [facility]  RN: Shirley Milner RN  Report Prepared By: Shirley Milner RN  Interpreting Physician: Job Officer, MD   IMPRESSIONS:  Probable normal study after maximal exercise with reproduction of symptoms  No ECG changes to suggest ischemia    Georgia Fischer images could not completely rule out the presence of ischemia  SUMMARY   STRESS RESULTS:  Duration of exercise was 6 min and 59 sec  Maximal work rate was 8 5 METs  Maximal heart rate during stress was 137 bpm ( 92 % of maximal predicted heart rate)  Target heart rate was achieved  There was no chest pain during stress  The patient experienced chest pain during stress; pain resolved spontaneously  ECG CONCLUSIONS:  The stress ECG was negative for ischemia  BASELINE:  There were no regional wall motion abnormalities  Estimated left ventricular ejection fraction was 60 %   PEAK STRESS:  Although no diagnostic regional wall motion abnormality was identified, this possibility cannot be completely excluded on the basis of this study  ECHO CONCLUSIONS:  There was no diagnostic evidence for stress-induced ischemia  HISTORY: The patient is a 70year old  female  Chest pain status: no chest pain  Other symptoms: dyspnea of recent onset  Coronary artery disease risk factors: dyslipidemia, hypertension, family history of coronary artery disease,  diabetes mellitus, and post-menopausal state  Cardiovascular history: none significant  Co-morbidity: obesity  Medications: a beta blocker, a calcium channel blocker, aspirin, and a lipid lowering agent  PHYSICAL EXAM: Baseline physical exam screening: normal and no wheezes audible  REST ECG: Normal sinus rhythm  Normal baseline ECG  PROCEDURE: The study was performed in the stress lab  The study was performed in the [facility]  Treadmill exercise testing was performed, using the Mara protocol  Stress and rest echocardiographic evaluation was  performed from multiple acoustic windows for evaluation of ventricular function     MARA PROTOCOL:  HR bpm SBP mmHg DBP mmHg Symptoms  Baseline 83 122 70 none  Stage 1 115 140 78 none  Stage 2 120 158 80 mild chest discomfort  Stage 3 136 158 80 mild chest discomfort  Immediate 136 158 80 --  Recovery 1 110 164 70 subsiding  Recovery 2 88 146 68 subsiding  Recovery 3 80 110 60 none  Recovery 5 88 112 78 none   MEDICATIONS GIVEN: No medications or fluids given  STRESS RESULTS: Duration of exercise was 6 min and 59 sec  The patient exercised to protocol stage 3  Maximal work rate was 8 5 METs  Functional capacity was normal  Maximal heart rate during stress was 137 bpm ( 92 % of maximal predicted  heart rate)  Target heart rate was achieved  Maximal systolic blood pressure during stress was 164 mmHg  The rate-pressure product for the peak heart rate and blood pressure was 49024  There was no chest pain during stress  The patient  experienced chest pain during stress; pain resolved spontaneously  The stress test was terminated due to achievement of maximal (symptom limited) exercise, mild chest discomfort, moderate dyspnea, and moderate fatigue  After the procedure,  the patient was discharged to home  ECG CONCLUSIONS: The stress ECG was negative for ischemia  Arrhythmia during stress: isolated premature ventricular beats and pairs of premature ventricular beats  STRESS 2D ECHO RESULTS:   BASELINE: There were no regional wall motion abnormalities  Left ventricular size was normal  Overall left ventricular systolic function was normal  Estimated left ventricular ejection fraction was 60 %   PEAK STRESS: Although no diagnostic regional wall motion abnormality was identified, this possibility cannot be completely excluded on the basis of this study  ECHO CONCLUSIONS: There was no diagnostic evidence for stress-induced ischemia  Prepared and electronically signed by   Lore Longo MD  Signed 17-Jul-2017 17:27:08     VAS RENAL ARTERY COMPLETE BILATERAL [id number] 08:35AM Maddison Arteaga Order Number: [id number]   - Patient Instructions: To schedule this appointment, please contact Central Scheduling at [phone]       Test Name Result Flag Reference   VAS RENAL ARTERY COMPLETE BILATERAL (Report)       [facility] REPORT  CLINICAL:  Indications: Benign Essential Hypertension [I10]  Patient was admitted into  the hospital with Elevated blood pressure on approximately June 11  Blood  pressure is still moderately elevated  Operative History:  Hysterectomy  Risk Factors: obesity, hypertension and hyperlipidemia  She has no history of  diabetes  Clinical  Left Pressure: 140/88 mm Hg  FINDINGS:    Unilateral PSV (cm/s) EDV (cm/s)   Sup-Naa Ao     56         Celiac       129     36   Prox  SMA      90     20     Right     Impression PSV (cm/s) EDV (cm/s)  RAR  RI Kidney (cm)   Ostial Renal           113     41 2 01             Prox Renal            113     41 2 01             Mid Renal             126     50 2 25             Dist Renal             68     19 1 21             Celiac Artery           37      9    0 76          Kidney                                9 90   Renal     Patent                                Left      Impression PSV (cm/s) EDV (cm/s)  RAR  RI Kidney (cm)   Ostial Renal            47     14 0 83             Prox Renal             81     22 1 43             Mid Renal             91     25 1 62             Dist Renal             75     22 1 33             Celiac Artery           38     13    0 76          Kidney                                10 50   Renal     Patent                                    CONCLUSION:    Impression  The abdominal aorta is widely patent and normal caliber  RIGHT RENAL:  No evidence of significant arterial occlusive disease in the main renal artery  Patent renal vein  Adequate parenchymal flow noted with a renovascular resistive index of 0 76  Renal/Aorta Ratio: 2 25  The Kidney measures 9 9 cm  x 6 5 cm  LEFT RENAL:  No evidence of significant arterial occlusive disease in the main renal artery  Patent renal vein  Adequate parenchymal flow noted with a renovascular resistive index of 0 76  Renal/Aorta Ratio: 1 62   The Kidney measures 10 5 cm  x 5 5 cm  MESENTERIC:  Celiac and superior mesenteric arteries are patent  No prior study for comparison  SIGNATURE:  Electronically Signed by: Randolph Sathishrebeka on 2017-07-17 05:40:05 PM     * DXA BONE DENSITY SPINE HIP AND PELVIS [id number] 08:12AM Apoorva Son Order Number: [id number]   - Patient Instructions: To schedule this appointment, please contact Central Scheduling at [phone]  Test Name Result Flag Reference   DXA BONE DENSITY SPINE HIP AND PELVIS (Report)       CENTRAL DXA SCAN   CLINICAL HISTORY:  70year old post-menopausal  female risk factors include gastroesophageal reflux with Prilosec use  Osteoporosis screening  TECHNIQUE: Bone densitometry was performed using a Hologic Horizon A bone densitometer  Regions of interest appear properly placed  There are no obvious fractures or other confounding variables which could limit the study  Degenerative changes of the   lumbar spine and hip  This will falsely elevate the bone mineral densities in these regions  COMPARISON: None  RESULTS:   LUMBAR SPINE: L1-L4:  BMD 0 957 gm/cm2  T-score -0 8  Z-score 1 4   LUMBAR SPINE L1-L3 (average) :        BMD 0 908 gm/sq-cm       T-score is -1 0        Z-score is 1 1      LEFT TOTAL HIP:  BMD 0 959 gm/cm2  T-score 0 1  Z-score 1 7   LEFT FEMORAL NECK:  BMD 0 657 gm/cm2  T-score -1 7  Z-score 0 2        IMPRESSION:  1  Based on the [facility] classification, the T-score of -1 7 in the left hip is consistent with low bone mineral density  2  The 10 year risk of hip fracture is 2 % with the 10 year risk of major osteoporotic fracture being 10 % as calculated by the WHO fracture risk assessment tool (FRAX)  The current NOF guidelines recommend treating patients with FRAX 10 year risk   score of >3% for hip fracture and >20% for major osteoporotic fracture  3  Any secondary causes of low bone mineral density should be excluded prior to treatment, if clinically indicated    4  A daily intake of at least 1200 mg calcium and 800 - 1000 IU of Vitamin D, as well as weight bearing and muscle strengthening exercise, fall prevention and avoidance of tobacco and excessive alcohol intake as basic preventive measures are suggested  WHO CLASSIFICATION:  Normal (a T-score of -1 0 or higher)  Low bone mineral density (a T-score of less than -1 0 but higher than -2 5)  Osteoporosis (a T-score of -2 5 or less)  Severe osteoporosis (a T-score of -2 5 or less with a fragility fracture)        Workstation performed: [id number]   Signed by:  Stephen Sue DO  7/7/17     (1) CBC/PLT/DIFF [id number] 07:11AM Michelle MATTA Order Number: [id number]     Test Name Result Flag Reference   WBC COUNT 7 22 Thousand/uL  4 31-10 16   RBC COUNT 4 86 Million/uL  3 81-5 12   HEMOGLOBIN 14 7 g/dL  11 5-15 4   HEMATOCRIT 45 4 %  34 8-46  1   MCV 93 fL  82-98   MCH 30 2 pg  26 8-34 3   MCHC 32 4 g/dL  31 4-37 4   RDW 14 3 %  11 6-15 1   MPV 12 9 fL H 8 9-12 7   PLATELET COUNT 877 Thousands/uL  149-390   nRBC AUTOMATED 0 /100 WBCs     NEUTROPHILS RELATIVE PERCENT 44 %  43-75   LYMPHOCYTES RELATIVE PERCENT 41 %  14-44   MONOCYTES RELATIVE PERCENT 11 %  4-12   EOSINOPHILS RELATIVE PERCENT 4 %  0-6   BASOPHILS RELATIVE PERCENT 0 %  0-1   NEUTROPHILS ABSOLUTE COUNT 3 11 Thousands/? ??L  1 85-7 62   LYMPHOCYTES ABSOLUTE COUNT 2 97 Thousands/? ??L  0 60-4 47   MONOCYTES ABSOLUTE COUNT 0 80 Thousand/? ??L  0 17-1 22   EOSINOPHILS ABSOLUTE COUNT 0 30 Thousand/? ??L  0 00-0 61   BASOPHILS ABSOLUTE COUNT 0 03 Thousands/? ??L  0 00-0 10     - Patient Instructions: This bloodwork is non-fasting  Please drink two glasses of water morning of bloodwork  - Patient Instructions: This bloodwork is non-fasting  Please drink two glasses of water morning of bloodwork       (1) COMPREHENSIVE METABOLIC PANEL [id number] 46:24VQ Corean Boy Order Number: [id number]     Test Name Result Flag Reference   GLUCOSE,RANDM 130 mg/dL     If the patient is fasting, the ADA then defines impaired fasting glucose as > 100 mg/dL and diabetes as > or equal to 123 mg/dL  SODIUM 140 mmol/L  136-145   POTASSIUM 4 0 mmol/L  3 5-5 3   CHLORIDE 105 mmol/L  100-108   CARBON DIOXIDE 27 mmol/L  21-32   ANION GAP (CALC) 8 mmol/L  4-13   BLOOD UREA NITROGEN 16 mg/dL  5-25   CREATININE 0 70 mg/dL  0 60-1 30   Standardized to IDMS reference method   CALCIUM 9 2 mg/dL  8 3-10 1   BILI, TOTAL 0 62 mg/dL  0 20-1 00   ALK PHOSPHATAS 102 U/L     ALT (SGPT) 78 U/L  12-78   AST(SGOT) 47 U/L H 5-45   ALBUMIN 3 8 g/dL  3 5-5 0   TOTAL PROTEIN 7 4 g/dL  6 4-8 2   eGFR Non-African American      >60 0 ml/min/1 73sq m     - Patient Instructions: This is a fasting blood test  Water,black tea or black  coffee only after 9:00pm the night before test Drink 2 glasses of water the morning of test - Patient Instructions: This bloodwork is non-fasting  Please drink two glasses of  water morning of bloodwork  National Kidney Disease Education Program recommendations are as follows: GFR calculation is accurate only with a steady state creatinine Chronic Kidney disease less than 60 ml/min/1 73 sq  meters Kidney failure less than 15 ml/min/1 73 sq  meters  (1) HEMOGLOBIN A1C [id number] 07:11AM [facility] Order Number: [id number]     Test Name Result Flag Reference   HEMOGLOBIN A1C 6 7 % H 4 2-6 3   EST  AVG  GLUCOSE 146 mg/dl       (1) LIPID PANEL, FASTING 25Aen3086 07:11AM Bambi Sallis Order Number: [id number]     Test Name Result Flag Reference   CHOLESTEROL 192 mg/dL     HDL,DIRECT 50 mg/dL  40-60   Specimen collection should occur prior to Metamizole administration due to the potential for falsely depressed results  LDL CHOLESTEROL CALCULATED 110 mg/dL H 0-100     - Patient Instructions:  This is a fasting blood test  Water,black tea or black  coffee only after 9:00pm the night before test  Drink 2 glasses of water the morning of test   - Patient Instructions: This is a fasting blood test  Water,black tea or black  coffee only after 9:00pm the night before test Drink 2 glasses of water the morning of test - Patient Instructions: This bloodwork is non-fasting  Please drink two glasses of  water morning of bloodwork  Triglyceride:       Normal              <150 mg/dl      Borderline High    150-199 mg/dl      High               200-499 mg/dl      Very High          >499 mg/dl Cholesterol:       Desirable        <200 mg/dl     Borderline High  200-239 mg/dl     High             >239 mg/dl HDL Cholesterol:      High    >59 mg/dL     Low     <41 mg/dL LDL CALCULATED:  This screening LDL is a calculated result  It does not have the accuracy of the Direct Measured LDL in the monitoring of patients with hyperlipidemia and/or statin therapy  Direct Measure LDL (QSK618) must be ordered separately in these patients  TRIGLYCERIDES 160 mg/dL H <=150   Specimen collection should occur prior to N-Acetylcysteine or Metamizole administration due to the potential for falsely depressed results  (1) TSH 43FAW2009 07:11AM Liz Riley Order Number: [id number]     Test Name Result Flag Reference   TSH 2 380 uIU/mL  0 358-3 740     - Patient Instructions: This bloodwork is non-fasting  Please drink two glasses of water morning of bloodwork  - Patient Instructions: This is a fasting blood test  Water,black tea or black  coffee only after 9:00pm the night before test Drink 2 glasses of water the morning of test - Patient Instructions: This bloodwork is non-fasting  Please drink two glasses of  water morning of bloodwork  Patients undergoing fluorescein dye angiography may retain small amounts of fluorescein in the body for 48-72 hours post procedure  Samples containing fluorescein can produce falsely depressed TSH values  If the patient had this procedure,a specimen should be resubmitted post fluorescein clearance       The recommended reference ranges for TSH during pregnancy are as follows: First trimester 0 1 to 2 5 uIU/mL Second trimester  0 2 to 3 0 uIU/mL Third trimester 0 3 to 3 0 uIU/m     (1) VITAMIN D 25-HYDROXY 26Ocg2124 07:11AM Dorie Knott Order Number: [id number]     Test Name Result Flag Reference   VIT D 25-HYDROX 24 0 ng/mL L 30 0-100 0     This assay is a certified procedure of the CDC Vitamin D Standardization Certification Program (VDSCP)   Deficiency <20ng/ml  Insufficiency 20-30ng/ml  Sufficient  ng/ml   *Patients undergoing fluorescein dye angiography may retain small amounts of fluorescein in the body for 48-72 hours post procedure  Samples containing fluorescein can produce falsely elevated Vitamin D values  If the patient had this procedure, a specimen should be resubmitted post fluorescein clearance  Health Management  Health Maintenance   Medicare Annual Wellness Visit; every 1 year; Next Due: 41XFO8392; Overdue    Future Appointments    Date/Time Provider Specialty Site   02/23/2018 09:00 AM PATO Marques   Family Medicine [id number]       Signatures   Electronically signed by : PATO Banuelos ; Dec  6 2017  7:40PM EST                       (Author)

## 2017-12-07 NOTE — PROGRESS NOTES
Assessment    1  Encounter for screening mammogram for malignant neoplasm of breast (V76 12) (Z12 31)   2  Encounter for gynecological examination (V72 31) (Z01 419)  Pelvic exam revealed no masses in the pelvis  The vaginal cuff was well supported  There is no blood or discharge in the vagina  The vaginal mucosa is atrophic  The vulva is normal  Rectal exam shows no blood or masses in the rectum and no nodularity in the cul-de-sac  Breast exam is normal    Plan  Encounter for gynecological examination, Encounter for screening mammogram formalignant neoplasm of breast    · Follow-up visit in 1 year Evaluation and Treatment  Follow-up  Status: Hold For -Scheduling  Requested for: [id number]   Ordered; For: Encounter for gynecological examination, Encounter for screening mammogram for malignant neoplasm of breast; Ordered By: Jyoti Stevens Performed:  Due: 28RDS0814  Encounter for screening mammogram for malignant neoplasm of breast    · * MAMMO SCREENING BILATERAL W CAD; Status:Active; Requested PJV:34KJC7115; Perform:[facility]; H:63YMR7950; Last Updated By:Howard Vanessa Leader; 12/6/2017 11:24:02 AM;Ordered;for screening mammogram for malignant neoplasm of breast; Ordered By:Tiffany Crystal; No Pap smear was taken at this visit  The patient was given a slip for bilateral screening mammogram to be performed after January 3, 2018  She will return in one year for follow-up GYN evaluation  Discussion/Summary    The patient was told that her breast and pelvic exam are normal  She will call if she has any vaginal infections over the next year  Chief Complaint  annual exam      History of Present Illness  HPI: This 75-year-old patient has had no vaginal bleeding or episodes of vaginitis over the past year  She occasionally gets mild hot flashes but no chronic fainting spells  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   She will see a neurosurgeon soon about the growth in her brain lining  Her bowel and bladder function are normal  She does have increased fatigue  GYN HM, Adult Female [facility]:   General Health:  Lifestyle:  She does not use tobacco -- She denies alcohol use  -- She denies drug use  Reproductive health:  hysterectomy  Screening: Cervical cancer screening includes a pap smear performed 12/2/2016  Breast cancer screening includes a mammogram performed 1/3/2017  Colorectal cancer screening includes a colonoscopy performed 12/4/2012  Metabolic screening includes DEXA performed 7/17/2017  Review of Systems   Constitutional: No fever, no chills, feels well, no tiredness, no recent weight gain or loss  ENT: no ear ache, no loss of hearing, no nosebleeds or nasal discharge, no sore throat or hoarseness  Cardiovascular: no complaints of slow or fast heart rate, no chest pain, no palpitations, no leg claudication or lower extremity edema  Respiratory: no complaints of shortness of breath, no wheezing, no dyspnea on exertion, no orthopnea or PND  Breasts: no complaints of breast pain, breast lump or nipple discharge  Gastrointestinal: no complaints of abdominal pain, no constipation, no nausea or diarrhea, no vomiting, no bloody stools  Genitourinary: no complaints of dysuria, no incontinence, no pelvic pain, no dysmenorrhea, no vaginal discharge or abnormal vaginal bleeding  Musculoskeletal: no complaints of arthralgia, no myalgia, no joint swelling or stiffness, no limb pain or swelling  Integumentary: no complaints of skin rash or lesion, no itching or dry skin, no skin wounds  Neurological: no complaints of headache, no confusion, no numbness or tingling, no dizziness or fainting  Active Problems    1  Advance directive discussed with patient (V65 49) (Z71 89)   2  Dermatitis (692 9) (L30 9)   3  Diabetes mellitus, type 2 (250 00) (E11 9)   4   Encounter for screening mammogram for malignant neoplasm of breast (V76 12) (Z12 31)   5  Essential hypertension (401 9) (I10)   6  Gastroesophageal reflux disease (530 81) (K21 9)   7  Low bone mass (733 90) (M85 80)   8  Mixed hyperlipidemia (272 2) (E78 2)   9  Need for shingles vaccine (V04 89) (Z23)   10  OAB (overactive bladder) (596 51) (N32 81)   11  Rosacea (695 3) (L71 9)   12  Vitamin D deficiency (268 9) (E55 9)    Past Medical History     · History of Actinic keratosis (702 0) (L57 0)   · History of Benign Polyps Of The Large Intestine (V12 72)   · History of Endometriosis (617 9) (N80 9)   · History of Eustachian tube dysfunction, bilateral (381 81) (H69 83)   · History of  4   · History of colonic polyps (V12 72) (Z86 010)   · History of malignant hypertension (V12 59) (Z86 79)   · History of Impaired fasting glucose (790 21) (R73 01)   · History of Pap smear, as part of routine gynecological examination (V76 2) (Z01 419)   · History of  (spontaneous vaginal delivery) (650) (O80)    Surgical History   · History of Complete Colonoscopy   · History of Hallux Valgus (Bunion) Correction   · History of Knee Arthroscopy (Therapeutic)   · History of Total Abdominal Hysterectomy With Removal Of Both Ovaries    Family History  Mother    · Family history of Acute Myocardial Infarction (V17 3)  Sister    · Family history of CABG   · Family history of Coronary Artery Disease (V17 49)   · Family history of Coronary Artery Disease (V17 49)  Brother    · Family history of Diabetes Mellitus (V18 0)    Social History     · Daily Coffee Consumption (2  Cups/Day)   · Exercising Regularly   · Four children   · Never A Smoker    Current Meds   1  AmLODIPine Besylate 5 MG Oral Tablet; TAKE 1 TABLET DAILY  Requested for: 00ARO7166; Last Rx:2017 Ordered   2  Aspirin 81 MG Oral Tablet Delayed Release Recorded   3  Atorvastatin Calcium 40 MG Oral Tablet; take 1 tablet by mouth once daily;  Therapy: 02HEX6861 to (Evaluate:2018)  Requested for: 10SPP1099; Last F28JBY7831 Ordered   4  Calcium 600 TABS; Therapy: (Iliana Lujan) to Recorded   5  [facility] Fish Oil CAPS Recorded   6  Metoprolol Succinate ER 50 MG Oral Tablet Extended Release 24 Hour; take 1 tablet by mouth once daily; Therapy: 03Xvg0762 to (Evaluate:18Ttj8641)  Requested for: 38ODD4609; Last Rx:83Hgh9764 Ordered   7  MetroNIDAZOLE 0 75 % External Cream; APPLY AND GENTLY MASSAGE INTO AFFECTED AREA(S) TWICE DAILY; Therapy: 16Jbn7718 to (Last Rx:71Gdf6616)  Requested for: 75Xou1252 Ordered   8  Multi-Vitamin Daily Oral Tablet Recorded   9  Omeprazole 20 MG Oral Capsule Delayed Release; Therapy: (Recorded:11Eoh9098) to Recorded   10  Oxybutynin Chloride ER 5 MG Oral Tablet Extended Release 24 Hour; take 1 tablet by  mouth once daily; Therapy: 72FED5273 to (LXONVAET:15CTG5909)  Requested for: 71MLP9864; Last  Rx:87Wks0294 Ordered   11  Vitamin D 1000 UNIT Oral Tablet; TAKE 1 TABLET DAILY; Therapy: 14QYH2494 to (Evaluate:61Qco6246); Last Rx:69Lce1233 Ordered    Allergies  1  Penicillins    Vitals   Recorded: 76IDH9960 48:65KO   Systolic 900   Diastolic 80   Height 4 ft 8 in   Weight 149 lb    BMI Calculated 33 41   BSA Calculated 1 57       Physical Exam   Constitutional  General appearance: No acute distress, well appearing and well nourished  Neck  Neck: Normal, supple, trachea midline, no masses  Thyroid: Normal, no thyromegaly  Pulmonary  Respiratory effort: No increased work of breathing or signs of respiratory distress  Auscultation of lungs: Clear to auscultation  Cardiovascular  Auscultation of heart: Normal rate and rhythm, normal S1 and S2, no murmurs  Peripheral vascular exam: Normal pulses Throughout  Genitourinary  External genitalia: Normal and no lesions appreciated  Vagina: Normal, no lesions or dryness appreciated  Urethra: Normal    Urethral meatus: Normal    Bladder: Normal, soft, non-tender and no prolapse or masses appreciated  Cervix: Surgically absent  Uterus: Surgically absent  Adnexa/parametria: Normal, non-tender and no fullness or masses appreciated  Anus, perineum, and rectum: Normal sphincter tone, no masses, and no prolapse  Chest  Breasts: Normal and no dimpling or skin changes noted  Abdomen  Abdomen: Normal, non-tender, and no organomegaly noted  Liver and spleen: No hepatomegaly or splenomegaly  Examination for hernias: No hernias appreciated  Lymphatic  Palpation of lymph nodes in neck, axillae, groin and/or other locations: No lymphadenopathy or masses noted  Skin  Skin and subcutaneous tissue: Normal skin turgor and no rashes  Palpation of skin and subcutaneous tissue: Normal    Psychiatric  Orientation to person, place, and time: Normal    Mood and affect: Normal        Future Appointments    Date/Time Provider Specialty Site   12/06/2017 01:30 PM PATO Ni  Family Medicine [id number]   02/23/2018 09:00 AM PATO Ni   Family Medicine [address]       Signatures   Electronically signed by : Sushil Marvin MD; Dec  6 2017 11:45AM EST                       (Author)

## 2017-12-09 LAB — MISCELLANEOUS LAB TEST RESULT: NORMAL

## 2017-12-19 LAB — MISCELLANEOUS LAB TEST RESULT: NORMAL

## 2018-01-10 ENCOUNTER — ALLSCRIPTS OFFICE VISIT (OUTPATIENT)
Dept: OTHER | Facility: OTHER | Age: 73
End: 2018-01-10

## 2018-01-10 ENCOUNTER — APPOINTMENT (OUTPATIENT)
Dept: RADIOLOGY | Facility: MEDICAL CENTER | Age: 73
End: 2018-01-10
Payer: COMMERCIAL

## 2018-01-10 DIAGNOSIS — M25.562 PAIN IN LEFT KNEE: ICD-10-CM

## 2018-01-10 PROCEDURE — 73562 X-RAY EXAM OF KNEE 3: CPT

## 2018-01-11 NOTE — RESULT NOTES
Verified Results  * DXA BONE DENSITY SPINE HIP AND PELVIS [id number] 08:12AM Sohan Smith Order Number: [id number]    - Patient Instructions: To schedule this appointment, please contact Central Scheduling at [phone]  Test Name Result Flag Reference   DXA BONE DENSITY SPINE HIP AND PELVIS (Report)     CENTRAL DXA SCAN     CLINICAL HISTORY:  70year old post-menopausal  female risk factors include gastroesophageal reflux with Prilosec use  Osteoporosis screening  TECHNIQUE: Bone densitometry was performed using a Hologic Horizon A bone densitometer  Regions of interest appear properly placed  There are no obvious fractures or other confounding variables which could limit the study  Degenerative changes of the    lumbar spine and hip  This will falsely elevate the bone mineral densities in these regions  COMPARISON: None  RESULTS:    LUMBAR SPINE: L1-L4:   BMD 0 957 gm/cm2   T-score -0 8   Z-score 1 4     LUMBAR SPINE L1-L3 (average) :         BMD 0 908 gm/sq-cm        T-score is -1 0         Z-score is 1 1          LEFT TOTAL HIP:   BMD 0 959 gm/cm2   T-score 0 1   Z-score 1 7     LEFT FEMORAL NECK:   BMD 0 657 gm/cm2   T-score -1 7   Z-score 0 2             IMPRESSION:   1  Based on the [facility] classification, the T-score of -1 7 in the left hip is consistent with low bone mineral density  2  The 10 year risk of hip fracture is 2 % with the 10 year risk of major osteoporotic fracture being 10 % as calculated by the WHO fracture risk assessment tool (FRAX)  The current NOF guidelines recommend treating patients with FRAX 10 year risk    score of >3% for hip fracture and >20% for major osteoporotic fracture  3  Any secondary causes of low bone mineral density should be excluded prior to treatment, if clinically indicated     4  A daily intake of at least 1200 mg calcium and 800 - 1000 IU of Vitamin D, as well as weight bearing and muscle strengthening exercise, fall prevention and avoidance of tobacco and excessive alcohol intake as basic preventive measures are suggested             WHO CLASSIFICATION:   Normal (a T-score of -1 0 or higher)   Low bone mineral density (a T-score of less than -1 0 but higher than -2 5)   Osteoporosis (a T-score of -2 5 or less)   Severe osteoporosis (a T-score of -2 5 or less with a fragility fracture)             Workstation performed: [id number]     Signed by:   Alison Plummer DO   7/7/17

## 2018-01-12 VITALS
BODY MASS INDEX: 33.29 KG/M2 | RESPIRATION RATE: 16 BRPM | SYSTOLIC BLOOD PRESSURE: 124 MMHG | HEART RATE: 68 BPM | TEMPERATURE: 97.6 F | DIASTOLIC BLOOD PRESSURE: 76 MMHG | WEIGHT: 148 LBS | HEIGHT: 56 IN

## 2018-01-12 NOTE — RESULT NOTES
Message  [facility] I have enclosed a copy of recent labs  fasting blood sugar 130 normal less than 100  A1c 6 7 normal < 5 7  by definition these results meet the criteria of type 2 diabetes  no medications needed at this time  goal for treatment A1c less than 7  vitamin D low at 24  you should be taking at least 2,000 units of vitamin D daily  AST one of several liver enzymes slightly elevated at 47 normal 5 to 45  diet and weight loss can correct this problem  Results/Data     (1) HEMOGLOBIN A1C   HEMOGLOBIN A1C: 6 7 % Abnormal High Reference Range 4 2-6 3  EST  AVG  GLUCOSE: 146 mg/dl  (1) COMPREHENSIVE METABOLIC PANEL   SODIUM: 316 mmol/L Reference Range 136-145  POTASSIUM: 4 0 mmol/L Reference Range 3 5-5 3  CHLORIDE: 105 mmol/L Reference Range 100-108  CARBON DIOXIDE: 27 mmol/L Reference Range 21-32  ANION GAP (CALC): 8 mmol/L Reference Range 4-13  BLOOD UREA NITROGEN: 16 mg/dL Reference Range 5-25  CREATININE: 0 70 mg/dL Reference Range 0 60-1 30  GLUCOSE,RANDM: 130 mg/dL Reference Range   CALCIUM: 9 2 mg/dL Reference Range 8 3-10 1  AST(SGOT): 47 U/L Abnormal High Reference Range 5-45  ALT (SGPT): 78 U/L Reference Range 12-78  ALK PHOSPHATAS: 102 U/L Reference Range   TOTAL PROTEIN: 7 4 g/dL Reference Range 6 4-8 2  ALBUMIN: 3 8 g/dL Reference Range 3 5-5 0  BILI, TOTAL: 0 62 mg/dL Reference Range 0 20-1 00  eGFR Non-: >60 0 ml/min/1 73sq m  (1) CBC/PLT/DIFF   WBC COUNT: 7 22 Thousand/uL Reference Range 4 31-10 16  RBC COUNT: 4 86 Million/uL Reference Range 3 81-5 12  HEMOGLOBIN: 14 7 g/dL Reference Range 11 5-15 4  HEMATOCRIT: 45 4 % Reference Range 34 8-46  1  MCV: 93 fL Reference Range 82-98  MCH: 30 2 pg Reference Range 26 8-34 3  MCHC: 32 4 g/dL Reference Range 31 4-37 4  RDW: 14 3 % Reference Range 11 6-15 1  MPV: 12 9 fL Abnormal High Reference Range 8 9-12 7  PLATELET COUNT: 581 Thousands/uL Reference Range 149-390  nRBC AUTOMATED: 0 /100 WBCs  NEUTROPHILS RELATIVE PERCENT: 44 % Reference Range 43-75  LYMPHOCYTES RELATIVE PERCENT: 41 % Reference Range 14-44  MONOCYTES RELATIVE PERCENT: 11 % Reference Range 4-12  EOSINOPHILS RELATIVE PERCENT: 4 % Reference Range 0-6  BASOPHILS RELATIVE PERCENT: 0 % Reference Range 0-1  NEUTROPHILS ABSOLUTE COUNT: 3 11 Thousands/? ??L Reference Range 1 85-7 62  LYMPHOCYTES ABSOLUTE COUNT: 2 97 Thousands/? ??L Reference Range 0 60-4 47  MONOCYTES ABSOLUTE COUNT: 0 80 Thousand/? ??L Reference Range 0 17-1 22  EOSINOPHILS ABSOLUTE COUNT: 0 30 Thousand/? ??L Reference Range 0 00-0 61  BASOPHILS ABSOLUTE COUNT: 0 03 Thousands/? ??L Reference Range 0 00-0 10  (1) VITAMIN D 25-HYDROXY   VIT D 25-HYDROX: 24 0 ng/mL Abnormal Low Reference Range 30 0-100 0  (1) LIPID PANEL, FASTING   CHOLESTEROL: 192 mg/dL Reference Range   TRIGLYCERIDES: 160 mg/dL Abnormal High Reference Range <=150  HDL,DIRECT: 50 mg/dL Reference Range 40-60  LDL CHOLESTEROL CALCULATED: 110 mg/dL Abnormal High Reference Range  0-100  (1) TSH   TSH: 2 380 uIU/mL Reference Range 0 358-3 740    Signatures   Electronically signed by : PATO Leal ; Feb 28 2017  6:45AM EST                       (Author)

## 2018-01-13 NOTE — PROGRESS NOTES
Assessment   1  Left knee pain (719 46) (M25 562)  2  Primary osteoarthritis of left knee (715 16) (M17 12)1      1 Amended By: Cheryle Ek; Jan 11 2018 5:48 PM EST      Plan   Left knee pain    · * XR KNEE 3 VW LEFT NON INJURY; Status:Active - Retrospective By Protocol    Authorization; Requested M:[id number]; Discussion/Summary      68-year-old female presenting with 9 months of persistent left knee pain secondary to left knee osteoarthritis  Radiographic evaluation of knee confirms the left knee osteoarthritis  Patient has previously received corticosteroid injections with great pain relief  Patient was here for 2nd opinion and would like to hold off on undergoing total knee arthroplasty at this time  Patient was offered another corticosteroid injection  This was injected into her left knee without complication  Patient tolerated the procedure well  Patient will be seen back in office in 3 months time for repeat evaluation and possible repeat injection  Patient will be allowed to bear full weight left lower extremity in the interim  Chief Complaint   1  Knee Pain  Left knee pain      History of Present Illness   HPI: 68-year-old female presents with worsening left knee pain since last March  Patient has previously been seen by [facility]  where she was told she required a knee replacement  Patient's pain is well located to medial aspect of left knee  Pain is made worse with increased activity  Pain subsided somewhat at rest and with administration of Tylenol  Patient denies any numbness or tingling  Patient has previously received corticosteroid injections left knee with great pain relief  She would like to hold off on having surgery if possible right now  Patient was recently diagnosed with Meniere's disease  Review of Systems      ROS reviewed  Active Problems   1  Advance directive discussed with patient (V65 49) (Z71 89)  2  Balance problem (471 99) (R26 89)  3   Dermatitis (692  9) (L30 9)  4  Diabetes mellitus, type 2 (250 00) (E11 9)  5  Dry skin (701 1) (L85 3)  6  Essential hypertension (401 9) (I10)  7  Fatigue (780 79) (R53 83)  8  Gastroesophageal reflux disease (530 81) (K21 9)  9  Hearing loss (389 9) (H91 90)  10  Hypertension, unspecified type (401 9) (I10)  11  Left knee pain (719 46) (M25 562)  12  Low bone mass (733 90) (M85 80)  13  Meningioma (225 2) (D32 9)  14  Mixed hyperlipidemia (272 2) (E78 2)  15  Nasal congestion (478 19) (R09 81)  16  OAB (overactive bladder) (596 51) (N32 81)  17  Rosacea (695 3) (L71 9)  18  Sudden-onset sensorineural hearing loss (388 2) (H91 20)  19  Vitamin D deficiency (268 9) (E55 9)  20  Wears glasses (V49 89) (Z97 3)    Past Medical History    · History of Actinic keratosis (702 0) (L57 0)   · History of Benign Polyps Of The Large Intestine (V12 72)   · History of Ear infection (382 9) (H66 90)   · History of Endometriosis (617 9) (N80 9)   · History of Eustachian tube dysfunction, bilateral (381 81) (H69 83)   · History of  4   · History of colonic polyps (V12 72) (Z86 010)   · History of malignant hypertension (V12 59) (Z86 79)   · History of Impaired fasting glucose (790 21) (R73 01)   · History of  (spontaneous vaginal delivery) (650) (O80)     The active problems and past medical history were reviewed and updated today  Surgical History    · History of Complete Colonoscopy   · History of Hallux Valgus (Bunion) Correction   · History of Knee Arthroscopy (Therapeutic)   · History of Total Abdominal Hysterectomy With Removal Of Both Ovaries     The surgical history was reviewed and updated today         Family History   Mother    · Family history of Acute Myocardial Infarction (V17 3)   · Family history of    · Family history of myocardial infarction (V17 3) (Z80 55)  Father    · Family history of   Sister    · Family history of CABG   · Family history of Coronary Artery Disease (V17 49)   · Family history of Coronary Artery Disease (V17 49)  Brother    · Family history of    · Family history of Diabetes Mellitus (V18 0)   · Family history of diabetes mellitus (V18 0) (Z83 3)  Family History    · Family history of malignant neoplasm of urinary bladder (V16 52) (Z80 52)     The family history was reviewed and updated today  Social History    · Denied: History of Alcohol use   · Daily Coffee Consumption (2  Cups/Day)   · Exercising Regularly   · Four children   · Less than high school   · Living alone (V60 3) (Z60 2)   · Never A Smoker   · Retired  The social history was reviewed and updated today  Current Meds   1  AmLODIPine Besylate 5 MG Oral Tablet; TAKE 1 TABLET DAILY  Requested for:     21PEM3308; Last Rx:2017 Ordered  2  Aspirin 81 MG Oral Tablet Delayed Release Recorded  3  Atorvastatin Calcium 40 MG Oral Tablet; take 1 tablet by mouth once daily; Therapy: 75WQF7951 to (Evaluate:2018)  Requested for: 62EHU0337; Last     Rx:2017 Ordered  4  Calcium 600 TABS; Therapy: (Sandy Justin) to Recorded  5  Centrum Silver Oral Tablet; TAKE 1 TABLET DAILY; Therapy: (Recorded:48Ghy8972) to Recorded  6  [employer] Fish Oil CAPS Recorded  7  Fish Oil 1200 MG Oral Capsule; take 1 capsule daily; Therapy: (Recorded:32Meg3637) to Recorded  8  Metoprolol Succinate ER 50 MG Oral Tablet Extended Release 24 Hour; take 1 tablet by     mouth once daily; Therapy: 83Ywf7471 to (Evaluate:07Aid1112)  Requested for: 05SMM8892; Last     Rx:38Fpi4467 Ordered  9  MetroNIDAZOLE 0 75 % External Cream; APPLY AND GENTLY MASSAGE INTO     AFFECTED AREA(S) TWICE DAILY; Therapy: 57Dbb1544 to (Last Rx:37Bmn8238)  Requested for: 15Zqf2870 Ordered  10  Multi-Vitamin Daily Oral Tablet Recorded  11  Omeprazole 20 MG Oral Capsule Delayed Release; TAKE 1 CAPSULE DAILY EVERY      MORNING BEFORE BREAKFAST; Therapy: (Recorded:11Fef3866) to Recorded  12   Oxybutynin Chloride ER 5 MG Oral Tablet Extended Release 24 Hour; take 1 tablet by      mouth once daily; Therapy: 81KCI4020 [facility]  Requested for: 27XTS9003; Last      Rx:14Uvl9659 Ordered  13  Vitamin D 1000 UNIT Oral Tablet; TAKE 1 TABLET DAILY; Therapy: 61JYJ6081 to (Evaluate:36Cjm9881); Last Rx:31Kvn6956 Ordered     The medication list was reviewed and updated today  Allergies   1  Penicillins    Vitals   Signs   Heart Rate: 73  Respiration: 18  Systolic: 221  Diastolic: 78  Height: 4 ft 8 in  Weight: 148 lb 2 08 oz  BMI Calculated: 33 21  BSA Calculated: 1 56    Physical Exam        Constitutional - General appearance: Normal       Cardiovascular - Pulses: Normal       Respiratory - Lungs - Clear to auscultation bilaterally, no rales, no rhonci, no wheezes  Respiratory rate: the respiratory rate was normal       Observation: no audible wheezing  Auscultation:  Free Text - Left lower extremity intact, no erythema, no ecchymosis, mild soft tissue swelling palpable knee effusion crepitation with knee flexion-extension stable to varus and valgus stress in mid flexion full extension to palpation over medial joint line, no tenderness palpation over lateral joint line extremity warm sensate  Results/Data   I personally reviewed the films/images/results in the office today  My interpretation follows  X-ray Review Left knee degenerative changes including severe medial joint space narrowing and osteophyte forming, no fractures  Procedure      Procedure: Injection of the left  Indication:  Osteoarthritis  Were discussed with the patient  Alcohol was used to prep the area  ethyl chloride spray was used as a topical anesthetic  The area was then injected with 1% lidocaine  Using sterile technique, the aspiration/injection needle was then directed from a Anterolateral aspect  A 22-gauge was used to inject 3 mL of 0 25% Bupivacaine-- and-- 2 mL of 40mg/mL methylprednisolone   A bandage was applied  the patient tolerated the procedure well  Complications: none  Follow-up in the office in 3 month(s)  Attending Note   70-year-old female with left knee DJD  Plan is as follows:          Weightbearing as tolerated          Steroid injection to left knee          Patient would like to refrain from any surgical intervention          Discussed treatment plan with patient and she is in agreement treatment plan  Thank you    Attending Note [facility]: Attending Note: I discussed the case with the Resident and reviewed the Resident's note  Future Appointments      Date/Time Provider Specialty Site   02/23/2018 09:00 AM PATO Mistry   Family Medicine [facility]   01/19/2018 10:15 AM Donal Schilder, [facility]   01/19/2018 11:00 AM Aron Claudio MD PhD Neurosurgery [place] [id number]    Electronically signed by : Manjo Austin MD; Fabiano 10 2018  4:20PM EST                       (Author)     Electronically signed by : PATO Lozano ; Jan 11 2018  5:48PM EST                       (Author)

## 2018-01-15 VITALS
TEMPERATURE: 98.8 F | DIASTOLIC BLOOD PRESSURE: 82 MMHG | HEART RATE: 72 BPM | SYSTOLIC BLOOD PRESSURE: 134 MMHG | HEIGHT: 56 IN | RESPIRATION RATE: 18 BRPM | BODY MASS INDEX: 35 KG/M2 | WEIGHT: 155.6 LBS

## 2018-01-16 NOTE — MISCELLANEOUS
Assessment    1  Accelerated hypertension (401 0) (I10)   2  Mixed hyperlipidemia (272 2) (E78 2)   3  Gastroesophageal reflux disease (530 81) (K21 9)   4  Exertional dyspnea (786 09) (R06 09)   5  Diabetes mellitus, type 2 (250 00) (E11 9)    Plan   Accelerated hypertension    · AmLODIPine Besylate 5 MG Oral Tablet; TAKE 1 TABLET DAILY   Rx By: Vikki Clark; Dispense: 90 Days ; #:90 Tablet; Refill: 3; For: Accelerated hypertension; ANSON = N; Verified Transmission to [facility] [address]; Last Updated By: SystemAstley Clarke; 2017 2:52:31 PM   · VAS RENAL ARTERY COMPLETE BILATERAL; Status:Hold For - Scheduling; Requested  KFW:[id number];    Perform:[facility]; Due:2018; Ordered; For:Accelerated hypertension; Ordered By:Irma Johnson; Advance directive discussed with patient    · We recommend that you create an advance directive ; Status:Complete;   Done:  [id number]   Ordered; For:Advance directive discussed with patient; Ordered By:Irma Johnson; Exertional dyspnea    · ECHO STRESS TEST W CONTRAST IF INDICATED; Status:Need Information - Financial  Authorization; Requested [id number];    Perform:[facility]; Order Comments:+ coronary artery calcifications  FH CAD mother  from MI age 48; Due:2018; Ordered;  For:Exertional dyspnea; Ordered By:Irma Johnson;    * DXA BONE DENSITY SPINE HIP AND PELVIS; Status:Hold For - Scheduling; Requested [id number];   Perform:[facility]; QBR:[id number]; Ordered; For:Screening for osteoporosis; Ordered By:Irma Johnson; Discussion/Summary  Discussion Summary:   Continue with current medications  Patient is on beta blocker, statin therapy ( Atorvastatin 40 mg/day) along with Aspirin 81 mg daily  Schedule renal artery Dopplers rule out renal artery stenosis  Schedule stress echocardiogram  follow up once results are back  advised to call if any changes  left TKR on hold pending further evaluation        History of Present Illness  TCM Communication [facility]: The patient is being contacted for follow-up after hospitalization  Hospital course was discussed with the inpatient physician  She was hospitalized at and [place] [id number]  The date of admission: 06/10/2017, date of discharge: 06/11/2017  Diagnosis: HTN  She was discharged to home  Medications were not reviewed today  She scheduled a follow up appointment  Counseling was provided to the patient  Topics counseled included importance of compliance with treatment  Communication performed and completed by Jules Nesbitt   HPI: follow up visit  here with daughter  ETHAN  79year old female s/p admission for accelerated hypertension  history of hypertension previously well controlled on Metoprolol ER 50 mg daily  Patient awoke from sleep feeling clammy and shaky  SBP noted to be 201  Evaluation emergency room  EKG normal sinus rhythm with nonspecific T-wave abnormalities  Troponin Ã3 normal  CTA showed no evidence of aortic dissection  No aortic aneurysm  No pulmonary embolus  Coronary artery calcifications noted  Mild cardiomegaly  Suspected benign cortical renal cysts  CT brain no acute abnormalities  Moderate microangiopathic changes  Atherosclerotic carotid and vertebral artery calcifications  Chest x-ray no active disease Lipid profile cholesterol 168  Triglycerides 136  HDL 43  LDL 98  CBC normal except for platelet count 069,850  WBC 5660  Hemoglobin 13 5  Nonfasting basic profile blood glucose 121  Creatinine 0 61  Electrolytes normal  LFTs normal except for alkaline phosphatase 119 TSH 0 846 UA no proteinuria  Trace leukocytes  medications reviewed  Amlodipine 5 mg daily added to regimen  Her blood pressures have been stable at home  out patient labs 02/2017 see note  type 2 DM diet controlled   A1c 6 7  + FH type 2 DM brother hyperlipidemia lipid profile 02/2017 cholesterol 192  TGs 160  HDL 50    on Atorvastatin 40 mg and fish oil capsules   LFTs normal except for AST 47  TSH 2 380      Review of Systems  Complete-Female:   Constitutional: 5 lb weight loss from 2017, but no recent weight gain and no recent weight loss  Eyes: no eyesight problems  ENT: sore throat and 2016 ENT evaluation for hoarseness she was diagnosed with GERD improved on Omeprazole  Cardiovascular: no chest pain, no intermittent leg claudication, no palpitations and no lower extremity edema  Respiratory: shortness of breath during exertion and patient and daughter report episodes of exertional dyspnea  FH CAD mother  at 48 from MI , but no cough, no orthopnea, no wheezing and no PND  Gastrointestinal: no abdominal pain, no nausea, no constipation, no diarrhea and no blood in stools  Genitourinary: OAB/urinary frequency stable on Vesicare, but no dysuria and no incontinence  Musculoskeletal: arthralgias and advanced OA left knee  she was scheduled for left TKR  , but no myalgias  Integumentary: no rashes and no skin lesions  Neurological: no falls, but no headache and no dizziness  Endocrine: vitamin D deficiency  on vitamin D 1,000 IU daily  2016 vitamin D level 35 7  Active Problems    1  Advance directive discussed with patient (V65 49) (Z71 89)   2  Essential hypertension (401 9) (I10)   3  Gastroesophageal reflux disease (530 81) (K21 9)   4  Mixed hyperlipidemia (272 2) (E78 2)   5  OAB (overactive bladder) (596 51) (N32 81)    Past Medical History    1  History of Actinic keratosis (702 0) (L57 0)   2  History of Benign Polyps Of The Large Intestine (V12 72)   3  History of Endometriosis (617 9) (N80 9)   4  History of Eustachian tube dysfunction, bilateral (381 81) (H69 83)   5  History of  4   6  History of colonic polyps (V12 72) (Z86 010)   7  History of Impaired fasting glucose (790 21) (R73 01)   8  History of Pap smear, as part of routine gynecological examination (V76 2) (Z01 419)   9   History of  (spontaneous vaginal delivery) (730) (O80)    Surgical History    1  History of Complete Colonoscopy   2  History of Hallux Valgus (Bunion) Correction   3  History of Knee Arthroscopy (Therapeutic)   4  History of Total Abdominal Hysterectomy With Removal Of Both Ovaries    Family History  Mother    1  Family history of Acute Myocardial Infarction (V17 3)  Sister    2  Family history of CABG   3  Family history of Coronary Artery Disease (V17 49)   4  Family history of Coronary Artery Disease (V17 49)  Brother    5  Family history of Diabetes Mellitus (V18 0)    Social History    · Daily Coffee Consumption (2  Cups/Day)   · Exercising Regularly   · Four children   · Never A Smoker    Current Meds   1  AmLODIPine Besylate 5 MG Oral Tablet; Therapy: (Recorded:16Jun2017) to Recorded   2  Aspirin 81 MG Oral Tablet Delayed Release Recorded   3  Atorvastatin Calcium 40 MG Oral Tablet; take 1 tablet by mouth once daily; Therapy: 39ERC7876 to (Evaluate:01Jan2018)  Requested for: 72JQZ2631; Last   Rx:06Jan2017 Ordered   4  [employer] Fish Oil CAPS Recorded   5  Metoprolol Succinate ER 50 MG Oral Tablet Extended Release 24 Hour; take 1 tablet by   mouth once daily; Therapy: 39Odg7473 to (Evaluate:51Jjz4638)  Requested for: 40LZC6741; Last   Rx:16Zbm4045 Ordered   6  Multi-Vitamin Daily Oral Tablet Recorded   7  Omeprazole 20 MG Oral Capsule Delayed Release; Therapy: (Recorded:30Nov2016) to Recorded   8  Oxybutynin Chloride ER 5 MG Oral Tablet Extended Release 24 Hour; take 1 tablet by   mouth once daily; Therapy: 58KGI7192 to (Verito Bueno)  Requested for: 41GPA6054; Last   Rx:05Jun2017 Ordered   9  Vitamin D 1000 UNIT Oral Tablet; TAKE 1 TABLET DAILY; Therapy: 00STV6292 to (Evaluate:58Tjq9487); Last Rx:22Apr2014 Ordered    Allergies    1   Penicillins    Vitals  Signs   Recorded: 01RVA7111 02:07PM   Temperature: 98 1 F  Heart Rate: 80  Respiration: 18  Systolic: 002  Diastolic: 88  Height: 4 ft 8 in  Weight: 150 lb 6 4 oz  BMI Calculated: 33 72  BSA Calculated: 1 57    Physical Exam    Constitutional   General appearance: No acute distress, well appearing and well nourished  Eyes   Conjunctiva and lids: No swelling, erythema or discharge  fundi not seen  Pupils and irises: Equal, round, reactive to light  Ears, Nose, Mouth, and Throat   Otoscopic examination: Tympanic membranes translucent with normal light reflex  Canals patent without erythema  Lips, teeth, and gums: Abnormal   Examination of the teeth revealed upper dentures  Oropharynx: Normal with no erythema, edema, exudate or lesions  Neck   Neck: Supple, symmetric, trachea midline, no masses  Thyroid: Normal, no thyromegaly  There were no thyroid nodules  Pulmonary   Auscultation of lungs: Clear to auscultation  Cardiovascular   Auscultation of heart: Normal rate and rhythm, normal S1 and S2, no murmurs  Heart sounds: no gallop heard  Carotid pulses: 2+ bilaterally  no bruit heard over the right carotid and no bruit heard over the left carotid  Abdominal aorta: Normal   Abdominal aorta: no bruit heard  Examination of extremities for edema and/or varicosities: Normal     Abdomen   Abdomen: Non-tender, no masses  Liver and spleen: No hepatomegaly or splenomegaly  Lymphatic   Palpation of lymph nodes in neck: No lymphadenopathy  no anterior cervical node enlargement, no posterior cervical node enlargement, no submandibular node enlargement and no supraclavicular node enlargement  Musculoskeletal   Gait and station: Normal     Skin   Skin and subcutaneous tissue: Normal without rashes or lesions  Neurologic   Cranial nerves: Cranial nerves II-XII intact      Psychiatric   Mood and affect: Normal        Results/Data  (1) CBC/PLT/DIFF 09ZYD9853 07:11AM Narinder Ramirez Order Number: [id number]     Test Name Result Flag Reference   WBC COUNT 7 22 Thousand/uL  4 31-10 16   RBC COUNT 4 86 Million/uL  3 81-5 12   HEMOGLOBIN 14 7 g/dL  11 5-15 4 HEMATOCRIT 45 4 %  34 8-46  1   MCV 93 fL  82-98   MCH 30 2 pg  26 8-34 3   MCHC 32 4 g/dL  31 4-37 4   RDW 14 3 %  11 6-15 1   MPV 12 9 fL H 8 9-12 7   PLATELET COUNT 535 Thousands/uL  149-390   nRBC AUTOMATED 0 /100 WBCs     NEUTROPHILS RELATIVE PERCENT 44 %  43-75   LYMPHOCYTES RELATIVE PERCENT 41 %  14-44   MONOCYTES RELATIVE PERCENT 11 %  4-12   EOSINOPHILS RELATIVE PERCENT 4 %  0-6   BASOPHILS RELATIVE PERCENT 0 %  0-1   NEUTROPHILS ABSOLUTE COUNT 3 11 Thousands/? ??L  1 85-7 62   LYMPHOCYTES ABSOLUTE COUNT 2 97 Thousands/? ??L  0 60-4 47   MONOCYTES ABSOLUTE COUNT 0 80 Thousand/? ??L  0 17-1 22   EOSINOPHILS ABSOLUTE COUNT 0 30 Thousand/? ??L  0 00-0 61   BASOPHILS ABSOLUTE COUNT 0 03 Thousands/? ??L  0 00-0 10   - Patient Instructions: This bloodwork is non-fasting  Please drink two glasses of water morning of bloodwork  - Patient Instructions: This bloodwork is non-fasting  Please drink two glasses of water morning of bloodwork  (1) COMPREHENSIVE METABOLIC PANEL 57GQE8653 38:58FU Jules Fleming Order Number: [id number]     Test Name Result Flag Reference   GLUCOSE,RANDM 130 mg/dL     If the patient is fasting, the ADA then defines impaired fasting glucose as > 100 mg/dL and diabetes as > or equal to 123 mg/dL  SODIUM 140 mmol/L  136-145   POTASSIUM 4 0 mmol/L  3 5-5 3   CHLORIDE 105 mmol/L  100-108   CARBON DIOXIDE 27 mmol/L  21-32   ANION GAP (CALC) 8 mmol/L  4-13   BLOOD UREA NITROGEN 16 mg/dL  5-25   CREATININE 0 70 mg/dL  0 60-1 30   Standardized to IDMS reference method   CALCIUM 9 2 mg/dL  8 3-10 1   BILI, TOTAL 0 62 mg/dL  0 20-1 00   ALK PHOSPHATAS 102 U/L     ALT (SGPT) 78 U/L  12-78   AST(SGOT) 47 U/L H 5-45   ALBUMIN 3 8 g/dL  3 5-5 0   TOTAL PROTEIN 7 4 g/dL  6 4-8 2   eGFR Non-African American      >60 0 ml/min/1 73sq m   - Patient Instructions:  This is a fasting blood test  Water,black tea or black  coffee only after 9:00pm the night before test Drink 2 glasses of water the morning of test - Patient Instructions: This bloodwork is non-fasting  Please drink two glasses of   water morning of bloodwork  National Kidney Disease Education Program recommendations are as follows:  GFR calculation is accurate only with a steady state creatinine  Chronic Kidney disease less than 60 ml/min/1 73 sq  meters  Kidney failure less than 15 ml/min/1 73 sq  meters  (1) HEMOGLOBIN A1C 55MDR8118 07:11AM [facility] Order Number: [id number]     Test Name Result Flag Reference   HEMOGLOBIN A1C 6 7 % H 4 2-6 3   EST  AVG  GLUCOSE 146 mg/dl       (1) LIPID PANEL, FASTING 42Zyo7627 07:11AM [facility] Order Number: [id number]     Test Name Result Flag Reference   CHOLESTEROL 192 mg/dL     HDL,DIRECT 50 mg/dL  40-60   Specimen collection should occur prior to Metamizole administration due to the potential for falsely depressed results  LDL CHOLESTEROL CALCULATED 110 mg/dL H 0-100   - Patient Instructions: This is a fasting blood test  Water,black tea or black  coffee only after 9:00pm the night before test   Drink 2 glasses of water the morning of test     - Patient Instructions: This is a fasting blood test  Water,black tea or black  coffee only after 9:00pm the night before test Drink 2 glasses of water the morning of test - Patient Instructions: This bloodwork is non-fasting  Please drink two glasses of   water morning of bloodwork  Triglyceride:         Normal              <150 mg/dl       Borderline High    150-199 mg/dl       High               200-499 mg/dl       Very High          >499 mg/dl  Cholesterol:         Desirable        <200 mg/dl      Borderline High  200-239 mg/dl      High             >239 mg/dl  HDL Cholesterol:        High    >59 mg/dL      Low     <41 mg/dL  LDL CALCULATED:    This screening LDL is a calculated result    It does not have the accuracy of the Direct Measured LDL in the monitoring of patients with hyperlipidemia and/or statin therapy  Direct Measure LDL (QVO551) must be ordered separately in these patients  TRIGLYCERIDES 160 mg/dL H <=150   Specimen collection should occur prior to N-Acetylcysteine or Metamizole administration due to the potential for falsely depressed results  (1) TSH 21AQV9395 07:11AM [employer] Order Number: [id number]     Test Name Result Flag Reference   TSH 2 380 uIU/mL  0 358-3 740   - Patient Instructions: This bloodwork is non-fasting  Please drink two glasses of water morning of bloodwork  - Patient Instructions: This is a fasting blood test  Water,black tea or black  coffee only after 9:00pm the night before test Drink 2 glasses of water the morning of test - Patient Instructions: This bloodwork is non-fasting  Please drink two glasses of   water morning of bloodwork  Patients undergoing fluorescein dye angiography may retain small amounts of fluorescein in the body for 48-72 hours post procedure  Samples containing fluorescein can produce falsely depressed TSH values  If the patient had this procedure,a specimen should be resubmitted post fluorescein clearance  The recommended reference ranges for TSH during pregnancy are as follows:  First trimester 0 1 to 2 5 uIU/mL  Second trimester  0 2 to 3 0 uIU/mL  Third trimester 0 3 to 3 0 uIU/m     (1) VITAMIN D 25-HYDROXY 24Boh0262 07:11AM [facility] Number: [id number]     Test Name Result Flag Reference   VIT D 25-HYDROX 24 0 ng/mL L 30 0-100 0   This assay is a certified procedure of the CDC Vitamin D Standardization Certification Program (VDSCP)     Deficiency <20ng/ml   Insufficiency 20-30ng/ml   Sufficient  ng/ml     *Patients undergoing fluorescein dye angiography may retain small amounts of fluorescein in the body for 48-72 hours post procedure  Samples containing fluorescein can produce falsely elevated Vitamin D values   If the patient had this procedure, a specimen should be resubmitted post fluorescein clearance  Health Management  Health Maintenance   Medicare Annual Wellness Visit; every 1 year; Next Due: 65YAS2339; Overdue    Future Appointments    Date/Time Provider Specialty Site   08/23/2017 11:00 AM PATO Zimmerman   Family Medicine [id number]   12/06/2017 11:00 AM Yane Villafana MD Obstetrics/Gynecology [facility]     Signatures   Electronically signed by : PATO Chaidez ; Jun 16 2017  4:06PM EST                       (Author)

## 2018-01-16 NOTE — RESULT NOTES
Verified Results  (B) PAP (REFLEX TO HPV PLUS WHEN ASC-US) 00LMR5716 09:06AM Carmen Asher     Test Name Result Flag Reference   PAP, LIQUID-BASED NILM     DIAGNOSIS:            Negative for intraepithelial lesion or malignancy  ADEQUACY:             Satisfactory for evaluation / No endocervical/                         transformation zone component present, consistent                         with status-post hysterectomy  COMMENT:              This Pap smear was screened with the assistance                         of the [employer]eral[employer](TM) Imaging System and                         screened by a cytotechnologist   SPECIMEN SOURCE:      PAP (RFLX HPV PLUS WHENASC-US), VAGINAL  CLINICAL INFORMATION: LMP: N/A                        Hysterectomy                        Provided Diagnosis Codes: Z01 419,V72 31                                                Cervicovaginal cytology should be considered a                         screening procedure subject to false negatives                         and false positives  Results are more reliable                         when a satisfactory sample is obtained on a                         regular repetitive basis, and should be                         interpreted together with past and current                         clinical data    ELECTRONICALLY SIGNED   BY:                   Screened By: TYRON Dodge (ASCP)   Case                         Electronically Signed 12/06/2016

## 2018-01-17 NOTE — PROGRESS NOTES
Assessment    1  Essential hypertension (401 9) (I10)   2  Impaired fasting glucose (790 21) (R73 01)   3  Mixed hyperlipidemia (272 2) (E78 2)   4  Vitamin D deficiency (268 9) (E55 9)   5  OAB (overactive bladder) (596 51) (N32 81)    Plan  Essential hypertension    · Follow-up visit in 6 months Evaluation and Treatment  Follow-up  Status: Hold For -  Scheduling  Requested for: 84Mqu7158   · We encourage you to begin to make lifestyle changes to help control your blood  pressure  These may include losing weight, increasing your activity level, limiting salt in  your diet, decreasing alcohol intake, and eating a diet low in fat and rich in fruits  and vegetables ; Status:Complete;   Done: 34KIJ8029 06:05PM  Essential hypertension, Impaired fasting glucose, Mixed hyperlipidemia, OAB (overactive  bladder), Vitamin D deficiency    · (1) CBC/PLT/DIFF; Status:Active; Requested for:98Syu8944;    · (1) COMPREHENSIVE METABOLIC PANEL; Status:Active; Requested for:45Wdh2156;    · (1) HEMOGLOBIN A1C; Status:Active; Requested for:40Ggm9992;    · (1) LIPID PANEL, FASTING; Status:Active; Requested for:09Dcw2255;   Mixed hyperlipidemia    · A diet low in sugar may help your condition ; Status:Complete;   Done: 61LKY3117  06:06PM   · Eat a low fat and low cholesterol diet ; Status:Complete;   Done: 79TWO4568 06:06PM    Discussion/Summary    Repeat labs prior to next visit in 6 months  Flu vaccine in the fall  She has a follow-up with ENT next month  History of Present Illness  follow up visit  medications reviewed  labs 07/2016 see note  type 2 DM diet controlled  decreased from 125   October 2015    A1c 10/2015 6 6  + FH type 2 DM brother hyperlipidemia cholesterol 153  TGs 126  HDL 39  LDL 89  on Atorvastatin and fish oil capsules  LFTs normal  TSH 1 430  Hypertension blood pressure stable on current regimen  Creatinine 0 77   Electrolytes normal       Review of Systems    Constitutional: recent weight loss and 8 lb weight loss from 05/2016  Eyes: no eyesight problems  ENT: sore throat and seen end of May 2016  + strep pharyngitis  persistent throat discomfort with post nasal drainage  ENT evaluation diagnosed with GERD improved on Omeprazole  Cardiovascular: no chest pain, no intermittent leg claudication, no palpitations and no lower extremity edema  Respiratory: no cough, no orthopnea, no wheezing, no shortness of breath during exertion and no PND  Gastrointestinal: no abdominal pain, no nausea, no constipation, no diarrhea and no blood in stools  Genitourinary: OAB/urinary frequency stable on Vesicare, but no dysuria and no incontinence  Musculoskeletal: no arthralgias and no myalgias  Integumentary: no rashes and no skin lesions  Neurological: no falls, but no headache and no dizziness  Endocrine: vitamin D deficiency  on vitamin D 1,000 IU daily  07/2016 vitamin D level 35 7  Active Problems    1  Essential hypertension (401 9) (I10)   2  Impaired fasting glucose (790 21) (R73 01)   3  Mixed hyperlipidemia (272 2) (E78 2)   4  OAB (overactive bladder) (596 51) (N32 81)   5  Vitamin D deficiency (268 9) (E55 9)    Past Medical History    1  History of Actinic keratosis (702 0) (L57 0)   2  History of Benign Polyps Of The Large Intestine (V12 72)   3  History of Endometriosis (617 9) (N80 9)   4  History of Eustachian tube dysfunction, bilateral (381 81) (H69 83)   5  History of Pap smear, as part of routine gynecological examination (V76 2) (Z01 419)    Surgical History    1  History of Hysterectomy   2  History of Knee Arthroscopy   3  History of Total Abdominal Hysterectomy With Removal Of Both Ovaries    Family History  Mother    1  Family history of Acute Myocardial Infarction (V17 3)  Sister    2  Family history of CABG   3  Family history of Coronary Artery Disease (V17 49)   4  Family history of Coronary Artery Disease (V17 49)  Brother    5   Family history of Diabetes Mellitus (V18 0)    Social History    · Daily Coffee Consumption (2  Cups/Day)   · Exercising Regularly   · Four children   · Never A Smoker    Current Meds   1  Aspirin 81 MG Oral Tablet Delayed Release Recorded   2  Atorvastatin Calcium 40 MG Oral Tablet; take 1 tablet by mouth once daily; Therapy: 96IQZ3319 to 01 72 64 30 83)  Requested for: 0490 39 07 81; Last   Rx:16Sqx6660 Ordered   3  [facility] Fish Oil CAPS Recorded   4  Fluticasone Propionate 50 MCG/ACT Nasal Suspension; USE 2 SPRAYS IN EACH   NOSTRIL ONCE DAILY; Therapy: 05Kux1550 to (Complete:83Wbs2969)  Requested for: 29Apr2016; Last   Rx:29Apr2016 Ordered   5  Metoprolol Succinate ER 50 MG Oral Tablet Extended Release 24 Hour; take 1 tablet by   mouth once daily; Therapy: 43Psa4992 to (Evaluate:75Fpp2134)  Requested for: 72SIS3097; Last   Rx:01Mar2016 Ordered   6  Multi-Vitamin Daily Oral Tablet Recorded   7  Oxybutynin Chloride ER 5 MG Oral Tablet Extended Release 24 Hour; take 1 tablet by   mouth once daily; Therapy: 75ZHU6949 to (Evaluate:61Tzk3569)  Requested for: 11UVZ7260; Last   Rx:30Irx7446 Ordered   8  Vitamin D 1000 UNIT Oral Tablet; TAKE 1 TABLET DAILY; Therapy: 25OPR5168 to (Evaluate:68Spe4849); Last Rx:22Apr2014 Ordered    Allergies    1  Penicillins    Vitals  Vital Signs    Recorded: 37UQP3813 72:84ZU   Systolic 140   Diastolic 70   Heart Rate 76   Respiration 16   Temperature 97 4 F   Height 4 ft 9 in   Weight 145 lb 6 08 oz   BMI Calculated 31 46   BSA Calculated 1 57     Physical Exam    Constitutional   General appearance: No acute distress, well appearing and well nourished  Eyes   Conjunctiva and lids: No swelling, erythema or discharge  fundi not seen  Pupils and irises: Equal, round, reactive to light  Ears, Nose, Mouth, and Throat   Otoscopic examination: Tympanic membranes translucent with normal light reflex  Canals patent without erythema      Lips, teeth, and gums: Abnormal   Examination of the teeth revealed upper dentures  Oropharynx: Normal with no erythema, edema, exudate or lesions  Neck   Neck: Supple, symmetric, trachea midline, no masses  Thyroid: Normal, no thyromegaly  There were no thyroid nodules  Pulmonary   Auscultation of lungs: Clear to auscultation  Cardiovascular   Auscultation of heart: Normal rate and rhythm, normal S1 and S2, no murmurs  Heart sounds: no gallop heard  Carotid pulses: 2+ bilaterally  no bruit heard over the right carotid and no bruit heard over the left carotid  Abdominal aorta: Normal   Abdominal aorta: no bruit heard  Examination of extremities for edema and/or varicosities: Normal     Abdomen   Abdomen: Non-tender, no masses  Liver and spleen: No hepatomegaly or splenomegaly  Lymphatic   Palpation of lymph nodes in neck: No lymphadenopathy  no anterior cervical node enlargement, no posterior cervical node enlargement, no submandibular node enlargement and no supraclavicular node enlargement  Skin   Skin and subcutaneous tissue: Normal without rashes or lesions  Psychiatric   Mood and affect: Normal        Results/Data  (1) COMPREHENSIVE METABOLIC PANEL 91YTV4349 60:49XG Tresea Calender    Order Number: [id number]  TW Order Number: [id number] Order Number: [id number] Order Number: [id number]     Test Name Result Flag Reference   GLUCOSE,RANDM 104 mg/dL     If the patient is fasting, the ADA then defines impaired fasting glucose as > 100 mg/dL and diabetes as > or equal to 123 mg/dL     SODIUM 139 mmol/L  136-145   POTASSIUM 3 8 mmol/L  3 5-5 3   CHLORIDE 106 mmol/L  100-108   CARBON DIOXIDE 27 mmol/L  21-32   ANION GAP (CALC) 6 mmol/L  4-13   BLOOD UREA NITROGEN 15 mg/dL  5-25   CREATININE 0 77 mg/dL  0 60-1 30   Standardized to IDMS reference method   CALCIUM 9 2 mg/dL  8 3-10 1   BILI, TOTAL 0 60 mg/dL  0 20-1 00   ALK PHOSPHATAS 86 U/L     ALT (SGPT) 44 U/L  12-78   AST(SGOT) 34 U/L  5-45   ALBUMIN 3 7 g/dL  3 5-5 0   TOTAL PROTEIN 7 5 g/dL  6 4-8 2   eGFR Non-African American      >60 0 ml/min/1 73sq m   [place] [facility] Disease Education Program recommendations are as follows:  GFR calculation is accurate only with a steady state creatinine  Chronic Kidney disease less than 60 ml/min/1 73 sq  meters  Kidney failure less than 15 ml/min/1 73 sq  meters  (1) CBC/PLT/DIFF 22Jul2016 07:55AM Amelia Avalos Order Number: [id number]  TW Order Number: [id number]     Test Name Result Flag Reference   WBC COUNT 5 89 Thousand/uL  4 31-10 16   RBC COUNT 4 82 Million/uL  3 81-5 12   HEMOGLOBIN 14 7 g/dL  11 5-15 4   HEMATOCRIT 44 4 %  34 8-46  1   MCV 92 fL  82-98   MCH 30 5 pg  26 8-34 3   MCHC 33 1 g/dL  31 4-37 4   RDW 13 6 %  11 6-15 1   MPV 13 8 fL H 8 9-12 7   PLATELET COUNT 035 Thousands/uL  149-390   nRBC AUTOMATED 0 /100 WBCs     NEUTROPHILS RELATIVE PERCENT 39 % L 43-75   LYMPHOCYTES RELATIVE PERCENT 50 % H 14-44   MONOCYTES RELATIVE PERCENT 7 %  4-12   EOSINOPHILS RELATIVE PERCENT 3 %  0-6   BASOPHILS RELATIVE PERCENT 1 %  0-1   NEUTROPHILS ABSOLUTE COUNT 2 28 Thousands/?L  1 85-7 62   LYMPHOCYTES ABSOLUTE COUNT 2 95 Thousands/?L  0 60-4 47   MONOCYTES ABSOLUTE COUNT 0 43 Thousand/?L  0 17-1 22   EOSINOPHILS ABSOLUTE COUNT 0 20 Thousand/?L  0 00-0 61   BASOPHILS ABSOLUTE COUNT 0 03 Thousands/?L  0 00-0 10     (1) LIPID PANEL, FASTING 22Jul2016 07:55AM Kimmie Koch    Order Number: [id number]  TW Order Number: [id number] Order Number: [id number] Order Number: [id number]     Test Name Result Flag Reference   CHOLESTEROL 153 mg/dL     HDL,DIRECT 39 mg/dL L 40-60   Specimen collection should occur prior to Metamizole administration due to the potential for falsely depressed results     LDL CHOLESTEROL CALCULATED 89 mg/dL  0-100   Triglyceride:         Normal              <150 mg/dl       Borderline High    150-199 mg/dl       High 200-499 mg/dl       Very High          >499 mg/dl  Cholesterol:         Desirable        <200 mg/dl      Borderline High  200-239 mg/dl      High             >239 mg/dl  HDL Cholesterol:        High    >59 mg/dL      Low     <41 mg/dL  LDL CALCULATED:    This screening LDL is a calculated result  It does not have the accuracy of the Direct Measured LDL in the monitoring of patients with hyperlipidemia and/or statin therapy  Direct Measure LDL (ZVY171) must be ordered separately in these patients  TRIGLYCERIDES 126 mg/dL  <=150   Specimen collection should occur prior to N-Acetylcysteine or Metamizole administration due to the potential for falsely depressed results  (1) TSH 56Ona5132 07:55AM Kimmie Koch    Order Number: [id number]  TW Order Number: [id number] Order Number: [id number] Order Number: [id number]     Test Name Result Flag Reference   TSH 1 430 uIU/mL  0 358-3 740   Patients undergoing fluorescein dye angiography may retain small amounts of fluorescein in the body for 48-72 hours post procedure  Samples containing fluorescein can produce falsely depressed TSH values  If the patient had this procedure,a specimen should be resubmitted post fluorescein clearance            The recommended reference ranges for TSH during pregnancy are as follows:  First trimester 0 1 to 2 5 uIU/mL  Second trimester  0 2 to 3 0 uIU/mL  Third trimester 0 3 to 3 0 uIU/m     (1) VITAMIN D 25-HYDROXY 80Vhg4184 07:55AM Kimmie Koch    Order Number: [id number]   Order Number: [id number]     Test Name Result Flag Reference   VIT D 25-HYDROX 35 7 ng/mL  30 0-100 0   This assay is a certified procedure of the CDC Vitamin D Standardization Certification Program (VDSCP)     Deficiency <20ng/ml   Insufficiency 20-30ng/ml   Sufficient  ng/ml     *Patients undergoing fluorescein dye angiography may retain small amounts of fluorescein in the body for 48-72 hours post procedure  Samples containing fluorescein can produce falsely elevated Vitamin D values  If the patient had this procedure, a specimen should be resubmitted post fluorescein clearance  Brii Cleveland W/CAD 47NVP9521 08:14AM Hyla Sham     Test Name Result Flag Reference   DIGTL SCREEN MAMMO W/CAD (Report)     [address] [facility];;[place];[address]   12/15/2015 0840   12/15/2015 2028   N/A     Patient History-   Patient is postmenopausal    Family history of breast cancer in sister at age 76 and ovaries    removed from sister at age 28  Took hormonal contraceptives for 4 years  Patient has never smoked  Patient's BMI is 32 5  Reason for exam- screening (asymptomatic)  [facility] Digtl Scrn Mammo W/CAD   Bilateral CC and MLO view(s) were taken  Technologist- ANTELMO Murphy (ANTELMO)(M)   Prior study comparison- December 3, 2014, bilateral [facility] digtl    scrn mammo w/CAD performed at [address] [facility]  December 2, 2013, digital bilateral screening mammogram,    performed at [address] [facility]  November 26, 2012,    digital bilateral screening mammogram, performed at [address] [facility]  November 21, 2011, digital bilateral screening   mammogram, performed at [address] [facility]  November    15, 2010, digital bilateral screening mammogram, performed at [address] [facility]  There are scattered fibroglandular densities  The parenchymal pattern appears stable  No dominant soft tissue    mass or suspicious calcifications are noted  The skin and nipple   contours are within normal limits  IMPRESSION-  No mammographic evidence of malignancy  No    significant changes when compared with prior studies  ASSESSMENT- BiRad-1 - Negative     Recommendation-   Routine screening mammogram in 1 year  A reminder letter will be   scheduled     Analyzed by CAD     8-10% of cancers will be missed on mammography  Management of a    palpable abnormality must be based on clinical grounds  Patients   will be notified of their results via letter from our facility  Accredited by [employer] of Radiology and FDA  Transcription Location- [address] [place] [address] Value(s)-   Kaela 10 Year- 2 387%, Tyrer-Raine Lifetime- 3 785%,    Myriad Table- 1 5%, SANTI 5 Year- 3 2%, NCI Lifetime- 9 2%     - CHRISTEN Carranza MD   Reading Radiologist- CHRISTEN Carranza MD   Electronically CHRISTEN Rivera MD   Released Date Time- 12/15/15 1023   ------------------------------------------------------------------------------   373[address]CARLOS [address] [facility] [place]   [address]^CARLOS [address] Maintenance   Medicare Annual Wellness Visit; every 1 year; Next Due: 81TJR5462; Active    Future Appointments    Date/Time Provider Specialty Site   02/01/2017 08:00 AM PATO Linda   Family Medicine [address]   11/30/2016 11:00 AM Pradip Palacios MD Obstetrics/Gynecology [facility] AND [facility]     Signatures   Electronically signed by : PATO Parra ; Jul 27 2016  6:04PM EST                       (Author)

## 2018-01-19 ENCOUNTER — ALLSCRIPTS OFFICE VISIT (OUTPATIENT)
Dept: OTHER | Facility: OTHER | Age: 73
End: 2018-01-19

## 2018-01-19 ENCOUNTER — TRANSCRIBE ORDERS (OUTPATIENT)
Dept: ADMINISTRATIVE | Facility: HOSPITAL | Age: 73
End: 2018-01-19

## 2018-01-19 DIAGNOSIS — D32.9: Primary | ICD-10-CM

## 2018-01-22 VITALS
WEIGHT: 150 LBS | RESPIRATION RATE: 16 BRPM | SYSTOLIC BLOOD PRESSURE: 130 MMHG | DIASTOLIC BLOOD PRESSURE: 84 MMHG | HEIGHT: 56 IN | TEMPERATURE: 98.8 F | HEART RATE: 80 BPM | BODY MASS INDEX: 33.74 KG/M2

## 2018-01-22 VITALS
DIASTOLIC BLOOD PRESSURE: 78 MMHG | SYSTOLIC BLOOD PRESSURE: 135 MMHG | HEIGHT: 56 IN | RESPIRATION RATE: 18 BRPM | BODY MASS INDEX: 33.32 KG/M2 | WEIGHT: 148.13 LBS | HEART RATE: 73 BPM

## 2018-01-22 VITALS
SYSTOLIC BLOOD PRESSURE: 132 MMHG | RESPIRATION RATE: 18 BRPM | TEMPERATURE: 98.1 F | HEIGHT: 56 IN | HEART RATE: 80 BPM | BODY MASS INDEX: 33.83 KG/M2 | WEIGHT: 150.4 LBS | DIASTOLIC BLOOD PRESSURE: 88 MMHG

## 2018-01-23 VITALS
DIASTOLIC BLOOD PRESSURE: 90 MMHG | WEIGHT: 145.5 LBS | BODY MASS INDEX: 32.73 KG/M2 | HEIGHT: 56 IN | SYSTOLIC BLOOD PRESSURE: 140 MMHG | RESPIRATION RATE: 12 BRPM | TEMPERATURE: 98.9 F

## 2018-01-23 VITALS
SYSTOLIC BLOOD PRESSURE: 144 MMHG | BODY MASS INDEX: 33.52 KG/M2 | WEIGHT: 149 LBS | HEIGHT: 56 IN | DIASTOLIC BLOOD PRESSURE: 80 MMHG

## 2018-01-23 VITALS
TEMPERATURE: 98.5 F | HEART RATE: 76 BPM | HEIGHT: 56 IN | SYSTOLIC BLOOD PRESSURE: 142 MMHG | RESPIRATION RATE: 16 BRPM | DIASTOLIC BLOOD PRESSURE: 80 MMHG | BODY MASS INDEX: 33.52 KG/M2 | WEIGHT: 149 LBS

## 2018-01-23 NOTE — CONSULTS
Assessment   1  Meningioma (225 2) (D32 9)    Plan   Meningioma    · Follow-up visit in 4 Months Evaluation and Treatment  Follow-up sovl / snplx Dr Eugenia Cuello    with MRI Brain  Status: Hold For - Scheduling  Requested for: [id number]  Ordered; For: Meningioma; Ordered By: Cindy Holbrook  Performed:   Due:     [id number]   · * MRI BRAIN W WO CONTRAST; Status:Need Information - Financial Authorization; Requested for:Approx [id number]; Perform:[facility]; Order Comments:MRI Brain without / with contrast with     Bravo (Follow up meningioma); Last Updated By:Paty Johansen; 1/19/2018 11:43:30     AM;Ordered; Shireen Gilbert; Ordered By:Kobe Huertas;  Meningioma, Preprocedural examination    · (Q) BUN/CREATININE RATIO; Status:Hold For - Exact Date; Requested for:Approx    [id number]; Perform:[facility];Ordered; For:Meningioma, Preprocedural examination; Ordered     By:Kobe Huertas;    Discussion/Summary      This is a 67year old female who presents for neurosurgical consultation for small left frontal meningioma which was incidentally identified on MRI Brain/IAC during work up for acute onset of right ear hearing loss in Oct  2017  denies any previous imaging of brain prior to the 11/20/17 MRI Brain )   Brain/IAC (11/20/17) was reviewed by Dr Eugenia Cuello  There is a small (-- 1 4 x 0 7cm) dural based extra-axial mass within the left anterior medial frontal region consistent with meningioma  There is no significant mass effect and no adjacent edema  The meningioma is an incidental finding and not the etiology of her sudden onset of right ear hearing loss in Oct  2017  The radiologist reported no CP angle mass or abnormal enhancement of IACs on the 11/20/17 MRI Brain / IAC  with patient and her son the spectrum of management for meningioma (ie  surveillance over time vs  SRS vs  surgical resection)   At this juncture continued surveillance with MRI Brain w/wo contrast in 4 months ( = 6 months from initial MRI 11/2017) is advised for continued surveillance of the small asymptomatic / incidental meningioma to evaluate if there is any change over time  She is to follow up in our office after MRI brain completed in 4 months  BUN/Creatinine lab ordered to be completed a few days prior to MRI brain  Ms Yo Abbasi and her son expressed understanding and agreement with continued surveillance  advised to contact our office or present to ER if she experiences new/worsening headache, seizure, mental status change, personality / cognitive / executive function change, weakness, or other neurological change  advised ongoing follow up with her PCP and ENT provider for ongoing management of her hearing issues  Shweta Fraser and her son expressed understanding and agreement  The patient has the current Goals: Review mri brain  The patent has the current Barriers: Right ear hearing loss  Patient is able to Self-Care  Chief Complaint   New patient evaluation for meningioma      History of Present Illness   Ms Shweta Fraser is a 67year old female who presents for neurosurgical consultation for small incidental meningioma left frontal region reported on MRI Brain (11/20/17) which was completed by ENT specialist for work up of right ear hearing loss  Shweta Fraser is referred by Dr Janny Guthrie (PCP)  Ms Shweta Fraser is accompanied with her son Sumanth Peters)  reports she began with sudden onset of right ear hearing loss at the end of Oct  2017  She reported dizziness and two days of of nausea/vomiting at onset of right ear hearing loss in Oct  2017  Patient reports she sought evaluation with Dr Alice Clark (ENT) for evaluation shortly after her symptoms began and according to patient she was advised she might of had a viral or bacterial infection of her right ear or possibly Menieres disease and reports she was placed on 13 days course of Prednisone  She denied being placed on an antibiotic   Patient reports her right ear hearing loss improved some but not back to her pre-existing baseline since she developed the right ear hearing loss in Oct  2017  Patient reports Dr Leilani Law referred her for MRI brain/IAC for evaluation of her right ear hearing loss  MRI Brain/IAC (11/20/17) reported no CP angle mass or abnormal enhancement of ICA's  However, the MRI brain did demonstrate a small dural based extra-axial mass within the left anterior medial frontal region consistent with meningioma  No significant mass effect and no adjacent edema  She is referred to our office for eval of meningioma  denies headache  denies dizziness or nausea/vomiting since episode when she began with acute onset of right ear hearing loss in Oct  2017  She denies hearing loss of left ear  denies seizure  completes ADL's and executive functions independent and without difficulty  denies memory or cognitive dysfunction  However, patient's son did make note that she forget once to close her garage door when she left home  and son deny her having personality change  reports she has worn glasses for about 10-15 years  She can experience blurry vision if she takes glasses off but denies any blurry vision with her glasses on  She denies diplopia  denies numbness, tingling, or weakness  ambulates independent and denies difficulty walking  denies any previous imaging of brain prior to the 11/20/17 MRI Brain  reports she has routine follow up with Dr Leilani Law of ENT in July 2018  Review of Systems        Constitutional: No fever, no chills, feels well, no tiredness, no recent weight gain or weight loss  Eyes: No complaints of eye pain, no red eyes, no eyesight problems, no discharge, no dry eyes, no itching of eyes  The patient presents with complaints of right ear hearing loss  Cardiovascular: No complaints of slow heart rate, no fast heart rate, no chest pain, no palpitations, no leg claudication, no lower extremity edema        Respiratory: No complaints of shortness of breath, no wheezing, no cough, no SOB on exertion, no orthopnea, no PND  Gastrointestinal: No complaints of abdominal pain, no constipation, no nausea or vomiting, no diarrhea, no bloody stools  Genitourinary: No complaints of dysuria, no incontinence, no pelvic pain, no dysmenorrhea, no vaginal discharge or bleeding  Musculoskeletal: No complaints of arthralgias, no myalgias, no joint swelling or stiffness, no limb pain or swelling  Integumentary: No complaints of skin rash or lesions, no itching, no skin wounds, no breast pain or lump  Neurological: no headache,-- no numbness,-- no tingling,-- no confusion,-- no dizziness,-- no limb weakness,-- no convulsions,-- no fainting-- and-- no difficulty walking  Psychiatric: Not suicidal, no sleep disturbance, no anxiety or depression, no change in personality, no emotional problems  Endocrine: No complaints of proptosis, no hot flashes, no muscle weakness, no deepening of the voice, no feelings of weakness  Hematologic/Lymphatic: No complaints of swollen glands, no swollen glands in the neck, does not bleed easily, does not bruise easily  ROS reviewed  Active Problems   1  Advance directive discussed with patient (V65 49) (Z71 89)  2  Balance problem (781 99) (R26 89)  3  Dermatitis (692 9) (L30 9)  4  Diabetes mellitus, type 2 (250 00) (E11 9)  5  Dry skin (701 1) (L85 3)  6  Essential hypertension (401 9) (I10)  7  Fatigue (780 79) (R53 83)  8  Gastroesophageal reflux disease (530 81) (K21 9)  9  Hearing loss (389 9) (H91 90)  10  Hypertension, unspecified type (401 9) (I10)  11  Left knee pain (719 46) (M25 562)  12  Low bone mass (733 90) (M85 80)  13  Meningioma (225 2) (D32 9)  14  Mixed hyperlipidemia (272 2) (E78 2)  15  Nasal congestion (478 19) (R09 81)  16  OAB (overactive bladder) (596 51) (N32 81)  17  Primary osteoarthritis of left knee (715 16) (M17 12)  18  Rosacea (695 3) (L71 9)  19   Sudden-onset sensorineural hearing loss (388 2) (H91 20)  20  Vitamin D deficiency (268 9) (E55 9)  21  Wears glasses (V49 89) (Z97 3)    Past Medical History   1  History of Actinic keratosis (702 0) (L57 0)  2  History of Benign Polyps Of The Large Intestine (V12 72)  3  History of Ear infection (382 9) (H66 90)  4  History of Endometriosis (617 9) (N80 9)  5  History of Eustachian tube dysfunction, bilateral (381 81) (H69 83)  6  History of  4  7  History of colonic polyps (V12 72) (Z86 010)  8  History of malignant hypertension (V12 59) (Z86 79)  9  History of Impaired fasting glucose (790 21) (R73 01)  10  History of  (spontaneous vaginal delivery) (650) (O80)     The active problems and past medical history were reviewed and updated today  Surgical History   1  History of Complete Colonoscopy  2  History of Hallux Valgus (Bunion) Correction  3  History of Knee Arthroscopy (Therapeutic)  4  History of Total Abdominal Hysterectomy With Removal Of Both Ovaries     The surgical history was reviewed and updated today  Family History   Mother   1  Family history of Acute Myocardial Infarction (V17 3)  2  Family history of   3  Family history of myocardial infarction (V17 3) (Z82 49)  Father   4  Family history of   Sister   11  Family history of CABG  6  Family history of Coronary Artery Disease (V17 49)  7  Family history of Coronary Artery Disease (V17 49)  Brother   8  Family history of   5  Family history of Diabetes Mellitus (V18 0)  10  Family history of diabetes mellitus (V18 0) (Z83 3)  Family History   11  Family history of malignant neoplasm of urinary bladder (F69 85) (Z80 52)     The family history was reviewed and updated today         Social History    · Denied: History of Alcohol use   · Daily Coffee Consumption (2  Cups/Day)   · Exercising Regularly   · Four children   · Less than high school   · Living alone (V60 3) (Z60 2)   · Never A Smoker   · Retired  The social history was reviewed and updated today  Current Meds   1  AmLODIPine Besylate 5 MG Oral Tablet; TAKE 1 TABLET DAILY  Requested for:     84DWT5807; Last Rx:16Jun2017 Ordered  2  Aspirin 81 MG Oral Tablet Delayed Release; TAKE 1 TABLET DAILY; Therapy: (Recorded:19Jan2018) to Recorded  3  Atorvastatin Calcium 40 MG Oral Tablet; take 1 tablet by mouth once daily; Therapy: 01NAQ2748 to (Evaluate:01Jan2018)  Requested for: 21PKW7337; Last     Rx:06Jan2017 Ordered  4  Calcium 600 TABS; TAKE 1 TABLET DAILY; Therapy: (Recorded:19Jan2018) to Recorded  5  Centrum Silver Oral Tablet; TAKE 1 TABLET DAILY; Therapy: (Recorded:18Dec2017) to Recorded  6  [employer] Fish Oil CAPS; take 1 capsule daily; Therapy: (Recorded:19Jan2018) to Recorded  7  Fish Oil 1200 MG Oral Capsule; take 1 capsule daily; Therapy: (Recorded:18Dec2017) to Recorded  8  Metoprolol Succinate ER 50 MG Oral Tablet Extended Release 24 Hour; take 1 tablet by     mouth once daily; Therapy: 73Dlj9006 to (Evaluate:59Rdl4364)  Requested for: 09BJL0506; Last     Rx:65Dee3179 Ordered  9  MetroNIDAZOLE 0 75 % External Cream; APPLY AND GENTLY MASSAGE INTO     AFFECTED AREA(S) TWICE DAILY; Therapy: 28Gxv3821 to (Last Rx:64Kav5886)  Requested for: 40Zhk2586 Ordered  10  Multi-Vitamin Daily Oral Tablet; TAKE 1 TABLET DAILY; Therapy: (Recorded:19Jan2018) to Recorded  11  Omeprazole 20 MG Oral Capsule Delayed Release; TAKE 1 CAPSULE DAILY EVERY      MORNING BEFORE BREAKFAST; Therapy: (Recorded:80Tak5635) to Recorded  12  Oxybutynin Chloride ER 5 MG Oral Tablet Extended Release 24 Hour; take 1 tablet by      mouth once daily; Therapy: 34ODH3141 to (ORJNSJTO:43CEI3894)  Requested for: 09RCE7363; Last      Rx:20Kdh4739 Ordered  13  Vitamin D 1000 UNIT Oral Tablet; TAKE 1 TABLET DAILY; Therapy: 88YWI4473 to (Evaluate:36Duo8718); Last Rx:11Alu3694 Ordered     The medication list was reviewed and updated today  Allergies   1  Penicillins    Vitals   Vital Signs    Recorded: 87SCH6250 10:10AM   Temperature 98 9 F   Respiration 12   Systolic 096   Diastolic 90   Height 4 ft 8 in   Weight 145 lb 8 oz   BMI Calculated 32 62   BSA Calculated 1 55   Pain Scale 0     Physical Exam         Constitutional Patient appears healthy and well developed  No signs of acute distress present  Head and Face Normal on inspection  Respiratory Respiratory effort: Normal       Neurologic - Mental Status: Alert and Oriented x3  -- Mood and affect: Affect is normal  -- [place] president -- [employer] intact  Recalls Smyth w/ clue  Recalls Jules  -- Speech is articulated and fluent  -- Id's colors x 3 correct  Shows correct number fingers both hands  Counts fingers correct  ID's colors x 3 correct  Id's pen, ink, and function to write  Names [address] [place] [address], [place], [facility]  100-7= said 92 but quickly correct to 93 -- Grossly nonfocal  -- Judgment and insight: Normal        Cranial Nerve Exam:  2nd CN: PERRLA  -- 3rd, 4th, and 6th cranial nerves: Normal with no deficit  -- 5th CN: Sensation to LT intact b/l V1-V3  Masseter intact b/l -- 7th cranial nerve: Face symmetrical at grimace and at rest       8th cranial nerve: Abnormal   Hearing right ear: finger rub was not heard, but-- whispered voice was heard  Hearing left ear: finger rub was heard-- and-- whispered voice was heard  -- 9th and 10th cranial nerves: Uvula is midline  -- 11th cranial nerve: Shoulder shrug equal bilaterally  -- 12th cranial nerve: Tongue mideline, no atrophy present  Motor System - Upper Extremities: Normal to inspection and palpation  Strength: Deltoids 5/5 bilaterally  Biceps 5/5 bilaterally  Triceps 5/5 bilaterally  Extensor carpi radials is 5/5 bilaterally  Extensor digitorum 5/5 bilaterally  Intrinsic 5/5 bilaterally   5/5 bilaterally  Motor System - Lower Extremities: Normal to inspection and palpation  Strength: iliopsoas 5/5 bilaterally  Quadriceps 5/5 bilaterally  Hamstrings 5/5 bilaterally  Gastrocnemius 5/5 bilaterally  -- Anterior tibialis 5/5 bilaterally  Coordination: Finger to nose was normal  -- (No pronator drift  Finger to nose intact bilaterally  JONELLE intact fingers bilaterally)-- Involuntary movements: None   Sensory: Sensation grossly intact to light touch  Sensation grossly intact to light touch  Gait and Station: [facility] with a normal gait  -- Independent  Tandem walk with occasional side step but self correct  Results/Data        I personally reviewed the mri brain in detail with the patient  * MRI BRAIN W WO CONTRAST 72VHI3021 09:03PM EPIC, Provider   Test ordered by: Gabriela Ignacio      Test Name Result Flag Reference   MRI BRAIN W WO CONTRAST (Report)     MRI BRAIN AND IAC'S - WITH AND WITHOUT CONTRAST           INDICATION: Right-sided hearing loss and tinnitus  COMPARISON: None  TECHNIQUE:      Brain:  Sagittal T1, axial T2, axial [place] and gradient, axial T1, axial FLAIR, axial diffusion imaging  Axial T1 postcontrast  Axial BRAVO post contrast          IAC'S: Coronal FIESTA, coronal T1 postcontrast, axial T1 postcontrast with fat suppression  Targeted images of the IAC'S were performed requiring additional time at acquisition and interpretation of approximately 25%           IV Contrast: 7 mL of Gadobutrol injection (SINGLE-DOSE)            IMAGE QUALITY:  Diagnostic  FINDINGS:           BRAIN PARENCHYMA: Scattered white matter lesions suggestive of chronic microangiopathy  Diffusion imaging is unremarkable with no evidence of acute ischemia  No acute or chronic hemorrhage  Normal corpus callosum and hypothalamus  There is a small dural based extra-axial mass within the left anterior medial frontal region demonstrating homogeneous enhancement consistent with meningioma   This is best seen on series 14, image 13 measuring 1 4 x 0 7 cm with slight mass effect upon       the adjacent brain parenchyma but no adjacent edema  IAC'S: No CP angle mass or abnormal enhancement  Normal aeration of the mastoid air cells and middle ear cavity  VENTRICLES: Normal            SELLA AND PITUITARY GLAND: Normal            ORBITS: Normal            PARANASAL SINUSES: Small retention cyst within the inferior aspect of the left maxillary sinus  VASCULATURE: Evaluation of the major intracranial vasculature demonstrates appropriate flow voids  CALVARIUM AND SKULL BASE: Normal            EXTRACRANIAL SOFT TISSUES: Normal                 IMPRESSION:           Mild scattered white matter change within the cerebral hemispheres consistent with chronic microangiopathy with no evidence of acute ischemia or hemorrhage  1 5 cm left frontal meningioma, series 14 image 13  Nonurgent neurosurgical follow-up recommended  Workstation performed: [id number]           Signed by:      Luis Lares DO      11/22/17      Attending Note   Collaborating Note:1  I supervised the Advanced Practitioner1 -- and-- I agree with the Advanced Practitioner note1   I discussed the case with the Advanced Practitioner and reviewed the AP note1  I agree with the Advanced Practitioner note Yvonne Morales personally reviewed  NCCN guidelines applied  1         1 Amended By: Aron Claudio; Jan 22 2018 10:09 PM EST      Future Appointments   Date/Time Provider Specialty Site   02/23/2018 09:00 AM PATO Mistry   [facility]     Signatures    Electronically signed by : Aidan Candelaria, [facility]; Jan 19 2018 12:21PM EST                       (Author)     Electronically signed by : Mayra Bhandari MD PhD; Jan 22 2018 10:09PM EST                       (Co-participant)

## 2018-01-24 ENCOUNTER — HOSPITAL ENCOUNTER (OUTPATIENT)
Dept: RADIOLOGY | Facility: MEDICAL CENTER | Age: 73
Discharge: HOME | End: 2018-01-24
Payer: COMMERCIAL

## 2018-01-24 DIAGNOSIS — Z12.31 ENCOUNTER FOR SCREENING MAMMOGRAM FOR MALIGNANT NEOPLASM OF BREAST: ICD-10-CM

## 2018-01-24 PROCEDURE — 77067 SCR MAMMO BI INCL CAD: CPT

## 2018-02-01 DIAGNOSIS — E11.9 TYPE 2 DIABETES MELLITUS WITHOUT COMPLICATIONS (HCC): ICD-10-CM

## 2018-02-01 DIAGNOSIS — N32.81 OVERACTIVE BLADDER: ICD-10-CM

## 2018-02-01 DIAGNOSIS — I10 ESSENTIAL (PRIMARY) HYPERTENSION: ICD-10-CM

## 2018-02-01 DIAGNOSIS — E78.2 MIXED HYPERLIPIDEMIA: ICD-10-CM

## 2018-02-01 DIAGNOSIS — K21.9 GASTRO-ESOPHAGEAL REFLUX DISEASE WITHOUT ESOPHAGITIS: ICD-10-CM

## 2018-02-09 ENCOUNTER — OFFICE VISIT (OUTPATIENT)
Dept: FAMILY MEDICINE CLINIC | Facility: CLINIC | Age: 73
End: 2018-02-09
Payer: COMMERCIAL

## 2018-02-09 VITALS
DIASTOLIC BLOOD PRESSURE: 68 MMHG | BODY MASS INDEX: 29.6 KG/M2 | HEIGHT: 59 IN | TEMPERATURE: 100.7 F | SYSTOLIC BLOOD PRESSURE: 114 MMHG | RESPIRATION RATE: 18 BRPM | WEIGHT: 146.8 LBS | HEART RATE: 88 BPM

## 2018-02-09 DIAGNOSIS — J40 BRONCHITIS: Primary | ICD-10-CM

## 2018-02-09 PROCEDURE — 99213 OFFICE O/P EST LOW 20 MIN: CPT | Performed by: INTERNAL MEDICINE

## 2018-02-09 RX ORDER — BENZONATATE 100 MG/1
100 CAPSULE ORAL 3 TIMES DAILY PRN
Qty: 20 CAPSULE | Refills: 0 | Status: SHIPPED | OUTPATIENT
Start: 2018-02-09 | End: 2018-07-09

## 2018-02-09 RX ORDER — AZITHROMYCIN 250 MG/1
TABLET, FILM COATED ORAL
Qty: 6 TABLET | Refills: 0 | Status: SHIPPED | OUTPATIENT
Start: 2018-02-09 | End: 2018-02-13

## 2018-02-09 RX ORDER — FLUTICASONE PROPIONATE 50 MCG
1 SPRAY, SUSPENSION (ML) NASAL DAILY
Qty: 16 G | Refills: 0 | Status: SHIPPED | OUTPATIENT
Start: 2018-02-09 | End: 2018-07-09

## 2018-02-09 RX ORDER — ACETAMINOPHEN 500 MG
500 TABLET ORAL EVERY 6 HOURS PRN
COMMUNITY

## 2018-02-09 NOTE — PATIENT INSTRUCTIONS
Add antibiotics for 5 days- take with food  Add nasal spray in am- 2 sprays  Add tessalon perles every 8 hours for cough  Add over the counter medication:  guiafenacin ( mucinex) 600 gm twice a day   Call if no better

## 2018-02-09 NOTE — PROGRESS NOTES
Assessment/Plan: Radha Lucia is a 67 y o  female with:   Problem List Items Addressed This Visit     Bronchitis - Primary    Relevant Medications    azithromycin (ZITHROMAX) 250 mg tablet    fluticasone (FLONASE) 50 mcg/act nasal spray    benzonatate (TESSALON PERLES) 100 mg capsule        Subjective:   Radha Lucia is a 67 y o  female who presents today with a chief complaint of Cough; Sore Throat; and Nasal Congestion    Sudden onset of cough and congestion; low grade fever 100 7; She has cough, congestion decreased hearing right ear;        Review of Systems   Constitutional: Positive for fatigue and fever  Negative for activity change, appetite change, chills and unexpected weight change  HENT: Positive for congestion, ear pain, postnasal drip, rhinorrhea, sinus pressure and sore throat  Negative for facial swelling, hearing loss, mouth sores, nosebleeds, sinus pain and sneezing  Eyes: Negative  Respiratory: Positive for cough  Negative for shortness of breath and stridor  Cardiovascular: Negative  Gastrointestinal: Negative  Endocrine: Negative  Genitourinary: Negative  Musculoskeletal: Negative  Allergic/Immunologic: Negative  Neurological: Negative  Hematological: Negative  Psychiatric/Behavioral: Negative  Objective:  /68   Pulse 88   Temp (!) 100 7 °F (38 2 °C) (Tympanic)   Resp 18   Ht 4' 10 5" (1 486 m)   Wt 66 6 kg (146 lb 12 8 oz)   BMI 30 16 kg/m²   Physical Exam   Constitutional: She is oriented to person, place, and time  She appears well-developed and well-nourished  HENT:   Head: Normocephalic and atraumatic  Right Ear: External ear normal    Left Ear: External ear normal    Pressure left max sinus   Eyes: Conjunctivae and EOM are normal  Pupils are equal, round, and reactive to light  Left eye exhibits no discharge  Neck: Normal range of motion  Neck supple  No tracheal deviation present  No thyromegaly present     Cardiovascular: Normal rate, regular rhythm and normal heart sounds  Pulmonary/Chest: Effort normal  No respiratory distress  She has wheezes  ronchi right mid lobe; no rales no wheeze   Abdominal: Soft  Musculoskeletal: Normal range of motion  Lymphadenopathy:     She has no cervical adenopathy  Neurological: She is alert and oriented to person, place, and time  She has normal reflexes  No cranial nerve deficit  Coordination normal    Skin: Skin is warm and dry  No rash noted  Psychiatric: She has a normal mood and affect   Her behavior is normal  Judgment and thought content normal            Histories Reviewed:  Patient's Medications   New Prescriptions    AZITHROMYCIN (ZITHROMAX) 250 MG TABLET    2 tabs today then 1 tab for 4 more days    BENZONATATE (TESSALON PERLES) 100 MG CAPSULE    Take 1 capsule (100 mg total) by mouth 3 (three) times a day as needed for cough    FLUTICASONE (FLONASE) 50 MCG/ACT NASAL SPRAY    1 spray into each nostril daily   Previous Medications    ACETAMINOPHEN (TYLENOL) 500 MG TABLET    Take 500 mg by mouth every 6 (six) hours as needed for mild pain    AMLODIPINE (NORVASC) 5 MG TABLET    Take 1 tablet by mouth every evening    ASPIRIN 81 MG CHEWABLE TABLET    Chew 81 mg daily    ATORVASTATIN (LIPITOR) 40 MG TABLET    Take 40 mg by mouth daily    CHOLECALCIFEROL (VITAMIN D3) 1,000 UNITS TABLET    Take 2,000 Units by mouth daily    METOPROLOL SUCCINATE (TOPROL-XL) 50 MG 24 HR TABLET    Take 50 mg by mouth daily    MULTIPLE VITAMIN (MULTIVITAMIN) TABLET    Take 1 tablet by mouth daily    MULTIPLE VITAMINS-MINERALS (CENTRUM SILVER PO)    Take 1 tablet by mouth daily    OMEGA-3 FATTY ACIDS (FISH OIL PO)    Take by mouth    OMEPRAZOLE (PRILOSEC) 20 MG DELAYED RELEASE CAPSULE    Take 20 mg by mouth daily    OXYBUTYNIN (DITROPAN-XL) 5 MG 24 HR TABLET    Take 5 mg by mouth daily   Modified Medications    No medications on file   Discontinued Medications    No medications on file     Allergies Allergen Reactions    Penicillins Rash     Past Medical History:   Diagnosis Date    GERD (gastroesophageal reflux disease)     Hyperlipidemia     Hypertension      Social History     Social History    Marital status:      Spouse name: N/A    Number of children: N/A    Years of education: N/A     Occupational History    Not on file  Social History Main Topics    Smoking status: Never Smoker    Smokeless tobacco: Never Used    Alcohol use No    Drug use: No    Sexual activity: Not on file     Other Topics Concern    Not on file     Social History Narrative    No narrative on file     Future Appointments  Date Time Provider Amrik Burch   2/23/2018 9:00 AM MD PADMA [facility]   4/11/2018 9:15 AM Denise Dan MD [facility]   5/25/2018 9:15 AM Ariane King MD [facility]     Patient Instructions   Add antibiotics for 5 days- take with food  Add nasal spray in am- 2 sprays  Add tessalon perles every 8 hours for cough  Add over the counter medication:  guiafenacin ( mucinex) 600 gm twice a day   Call if no better       No results found for this visit on 02/09/18

## 2018-02-14 ENCOUNTER — APPOINTMENT (OUTPATIENT)
Dept: LAB | Facility: CLINIC | Age: 73
End: 2018-02-14
Payer: COMMERCIAL

## 2018-02-14 DIAGNOSIS — E11.9 TYPE 2 DIABETES MELLITUS WITHOUT COMPLICATIONS (HCC): ICD-10-CM

## 2018-02-14 DIAGNOSIS — I10 ESSENTIAL (PRIMARY) HYPERTENSION: ICD-10-CM

## 2018-02-14 DIAGNOSIS — K21.9 GASTRO-ESOPHAGEAL REFLUX DISEASE WITHOUT ESOPHAGITIS: ICD-10-CM

## 2018-02-14 DIAGNOSIS — N32.81 OVERACTIVE BLADDER: ICD-10-CM

## 2018-02-14 DIAGNOSIS — E78.2 MIXED HYPERLIPIDEMIA: ICD-10-CM

## 2018-02-14 DIAGNOSIS — E55.9 VITAMIN D DEFICIENCY: ICD-10-CM

## 2018-02-14 LAB
25(OH)D3 SERPL-MCNC: 46.1 NG/ML (ref 30–100)
ALBUMIN SERPL BCP-MCNC: 3.7 G/DL (ref 3.5–5)
ALP SERPL-CCNC: 155 U/L (ref 46–116)
ALT SERPL W P-5'-P-CCNC: 92 U/L (ref 12–78)
ANION GAP SERPL CALCULATED.3IONS-SCNC: 10 MMOL/L (ref 4–13)
AST SERPL W P-5'-P-CCNC: 60 U/L (ref 5–45)
BASOPHILS # BLD AUTO: 0.02 THOUSANDS/ΜL (ref 0–0.1)
BASOPHILS NFR BLD AUTO: 0 % (ref 0–1)
BILIRUB SERPL-MCNC: 0.57 MG/DL (ref 0.2–1)
BUN SERPL-MCNC: 13 MG/DL (ref 5–25)
CALCIUM SERPL-MCNC: 9 MG/DL (ref 8.3–10.1)
CHLORIDE SERPL-SCNC: 108 MMOL/L (ref 100–108)
CHOLEST SERPL-MCNC: 142 MG/DL (ref 50–200)
CO2 SERPL-SCNC: 25 MMOL/L (ref 21–32)
CREAT SERPL-MCNC: 0.59 MG/DL (ref 0.6–1.3)
CREAT UR-MCNC: 110 MG/DL
EOSINOPHIL # BLD AUTO: 0.16 THOUSAND/ΜL (ref 0–0.61)
EOSINOPHIL NFR BLD AUTO: 2 % (ref 0–6)
ERYTHROCYTE [DISTWIDTH] IN BLOOD BY AUTOMATED COUNT: 13.3 % (ref 11.6–15.1)
EST. AVERAGE GLUCOSE BLD GHB EST-MCNC: 151 MG/DL
GFR SERPL CREATININE-BSD FRML MDRD: 92 ML/MIN/1.73SQ M
GLUCOSE P FAST SERPL-MCNC: 131 MG/DL (ref 65–99)
HBA1C MFR BLD: 6.9 % (ref 4.2–6.3)
HCT VFR BLD AUTO: 43.1 % (ref 34.8–46.1)
HDLC SERPL-MCNC: 33 MG/DL (ref 40–60)
HGB BLD-MCNC: 14.5 G/DL (ref 11.5–15.4)
LDLC SERPL CALC-MCNC: 77 MG/DL (ref 0–100)
LYMPHOCYTES # BLD AUTO: 3.15 THOUSANDS/ΜL (ref 0.6–4.47)
LYMPHOCYTES NFR BLD AUTO: 48 % (ref 14–44)
MCH RBC QN AUTO: 30.4 PG (ref 26.8–34.3)
MCHC RBC AUTO-ENTMCNC: 33.6 G/DL (ref 31.4–37.4)
MCV RBC AUTO: 90 FL (ref 82–98)
MICROALBUMIN UR-MCNC: 24.4 MG/L (ref 0–20)
MICROALBUMIN/CREAT 24H UR: 22 MG/G CREATININE (ref 0–30)
MONOCYTES # BLD AUTO: 0.45 THOUSAND/ΜL (ref 0.17–1.22)
MONOCYTES NFR BLD AUTO: 7 % (ref 4–12)
NEUTROPHILS # BLD AUTO: 2.84 THOUSANDS/ΜL (ref 1.85–7.62)
NEUTS SEG NFR BLD AUTO: 43 % (ref 43–75)
NRBC BLD AUTO-RTO: 0 /100 WBCS
PLATELET # BLD AUTO: 174 THOUSANDS/UL (ref 149–390)
PMV BLD AUTO: 12.3 FL (ref 8.9–12.7)
POTASSIUM SERPL-SCNC: 3.8 MMOL/L (ref 3.5–5.3)
PROT SERPL-MCNC: 7.6 G/DL (ref 6.4–8.2)
RBC # BLD AUTO: 4.77 MILLION/UL (ref 3.81–5.12)
SODIUM SERPL-SCNC: 143 MMOL/L (ref 136–145)
TRIGL SERPL-MCNC: 159 MG/DL
TSH SERPL DL<=0.05 MIU/L-ACNC: 1.52 UIU/ML (ref 0.36–3.74)
WBC # BLD AUTO: 6.63 THOUSAND/UL (ref 4.31–10.16)

## 2018-02-14 PROCEDURE — 36415 COLL VENOUS BLD VENIPUNCTURE: CPT

## 2018-02-14 PROCEDURE — 82306 VITAMIN D 25 HYDROXY: CPT

## 2018-02-14 PROCEDURE — 82570 ASSAY OF URINE CREATININE: CPT

## 2018-02-14 PROCEDURE — 3061F NEG MICROALBUMINURIA REV: CPT | Performed by: INTERNAL MEDICINE

## 2018-02-14 PROCEDURE — 80061 LIPID PANEL: CPT

## 2018-02-14 PROCEDURE — 80053 COMPREHEN METABOLIC PANEL: CPT

## 2018-02-14 PROCEDURE — 84443 ASSAY THYROID STIM HORMONE: CPT

## 2018-02-14 PROCEDURE — 82043 UR ALBUMIN QUANTITATIVE: CPT

## 2018-02-14 PROCEDURE — 85025 COMPLETE CBC W/AUTO DIFF WBC: CPT

## 2018-02-14 PROCEDURE — 83036 HEMOGLOBIN GLYCOSYLATED A1C: CPT

## 2018-02-23 ENCOUNTER — OFFICE VISIT (OUTPATIENT)
Dept: FAMILY MEDICINE CLINIC | Facility: CLINIC | Age: 73
End: 2018-02-23
Payer: COMMERCIAL

## 2018-02-23 VITALS
SYSTOLIC BLOOD PRESSURE: 130 MMHG | WEIGHT: 143.4 LBS | DIASTOLIC BLOOD PRESSURE: 74 MMHG | BODY MASS INDEX: 28.91 KG/M2 | RESPIRATION RATE: 16 BRPM | TEMPERATURE: 96.1 F | HEIGHT: 59 IN | HEART RATE: 74 BPM

## 2018-02-23 DIAGNOSIS — E55.9 VITAMIN D DEFICIENCY: ICD-10-CM

## 2018-02-23 DIAGNOSIS — I10 ESSENTIAL HYPERTENSION: ICD-10-CM

## 2018-02-23 DIAGNOSIS — M85.80 LOW BONE MASS: ICD-10-CM

## 2018-02-23 DIAGNOSIS — Z00.00 MEDICARE ANNUAL WELLNESS VISIT, SUBSEQUENT: Primary | ICD-10-CM

## 2018-02-23 DIAGNOSIS — K21.9 GASTROESOPHAGEAL REFLUX DISEASE WITHOUT ESOPHAGITIS: ICD-10-CM

## 2018-02-23 DIAGNOSIS — E11.9 TYPE 2 DIABETES MELLITUS WITHOUT COMPLICATION, WITHOUT LONG-TERM CURRENT USE OF INSULIN (HCC): ICD-10-CM

## 2018-02-23 DIAGNOSIS — D32.9 MENINGIOMA (HCC): ICD-10-CM

## 2018-02-23 DIAGNOSIS — E78.2 MIXED HYPERLIPIDEMIA: ICD-10-CM

## 2018-02-23 DIAGNOSIS — M17.12 PRIMARY OSTEOARTHRITIS OF LEFT KNEE: ICD-10-CM

## 2018-02-23 DIAGNOSIS — N32.81 OAB (OVERACTIVE BLADDER): ICD-10-CM

## 2018-02-23 PROBLEM — H91.90 HEARING LOSS: Status: ACTIVE | Noted: 2017-12-18

## 2018-02-23 PROBLEM — J40 BRONCHITIS: Status: RESOLVED | Noted: 2018-02-09 | Resolved: 2018-02-23

## 2018-02-23 PROBLEM — H91.20 SUDDEN-ONSET SENSORINEURAL HEARING LOSS: Status: ACTIVE | Noted: 2017-12-06

## 2018-02-23 PROBLEM — L71.9 ROSACEA: Status: ACTIVE | Noted: 2017-09-12

## 2018-02-23 PROBLEM — H91.90 HEARING LOSS: Status: RESOLVED | Noted: 2017-12-18 | Resolved: 2018-02-23

## 2018-02-23 PROCEDURE — 1101F PT FALLS ASSESS-DOCD LE1/YR: CPT | Performed by: FAMILY MEDICINE

## 2018-02-23 PROCEDURE — G0439 PPPS, SUBSEQ VISIT: HCPCS | Performed by: FAMILY MEDICINE

## 2018-02-23 PROCEDURE — 99214 OFFICE O/P EST MOD 30 MIN: CPT | Performed by: FAMILY MEDICINE

## 2018-02-23 RX ORDER — AMLODIPINE BESYLATE 5 MG/1
5 TABLET ORAL EVERY EVENING
Qty: 90 TABLET | Refills: 3 | Status: SHIPPED | OUTPATIENT
Start: 2018-02-23 | End: 2018-06-18 | Stop reason: SDUPTHER

## 2018-02-23 RX ORDER — METOPROLOL SUCCINATE 50 MG/1
50 TABLET, EXTENDED RELEASE ORAL DAILY
Qty: 90 TABLET | Refills: 3 | Status: SHIPPED | OUTPATIENT
Start: 2018-02-23 | End: 2018-10-15

## 2018-02-23 RX ORDER — ATORVASTATIN CALCIUM 40 MG/1
40 TABLET, FILM COATED ORAL DAILY
Qty: 90 TABLET | Refills: 3 | Status: SHIPPED | OUTPATIENT
Start: 2018-02-23 | End: 2018-07-09

## 2018-02-23 RX ORDER — AMLODIPINE BESYLATE 5 MG/1
5 TABLET ORAL EVERY EVENING
Qty: 90 TABLET | Refills: 3 | Status: SHIPPED | OUTPATIENT
Start: 2018-02-23 | End: 2018-02-23 | Stop reason: SDUPTHER

## 2018-02-23 NOTE — PROGRESS NOTES
Assessment/Plan:         Diagnoses and all orders for this visit:    Medicare annual wellness visit, subsequent    Essential hypertension  -     Discontinue: amLODIPine (NORVASC) 5 mg tablet; Take 1 tablet (5 mg total) by mouth every evening for 90 days  -     metoprolol succinate (TOPROL-XL) 50 mg 24 hr tablet; Take 1 tablet (50 mg total) by mouth daily for 90 days  -     amLODIPine (NORVASC) 5 mg tablet; Take 1 tablet (5 mg total) by mouth every evening for 90 days    Mixed hyperlipidemia  -     atorvastatin (LIPITOR) 40 mg tablet; Take 1 tablet (40 mg total) by mouth daily for 90 days    Gastroesophageal reflux disease without esophagitis    Type 2 diabetes mellitus without complication, without long-term current use of insulin (HCC)    Meningioma (HCC)    Low bone mass    Primary osteoarthritis of left knee    OAB (overactive bladder)    Vitamin D deficiency          Continue with current medications  Diet weight loss and exercise  Vitamin D and calcium supplementation  Office visit in 6 months with repeat labs at that time  Patient ID: Elizabeth Gomes is a 67 y o  female  Follow-up visit  Medications reviewed  Labs 02/2018 see note  Hypertension  Blood pressure stable on Amlodipine 5 mg daily and Metoprolol ER 50 mg daily  ER admission 06/2017 for accelerated hypertension  07/2017 renal artery Dopplers no renal artery stenosis  Type 2 diabetes mellitus diet controlled  A1c 6 9  Urine microalbumin 24 4  Not on ACE or ARB  No neuropathy symptoms  Current with eye exam   Hyperlipidemia mixed type on Atorvastatin 40 mg daily  Cholesterol 142  Triglycerides 159  HDL 33  LDL 77  LFTs normal   Except for AST 60, ALT 92 and alkaline phosphatase 155  TSH 1 520        Past Medical History:   Diagnosis Date    Endometriosis     GERD (gastroesophageal reflux disease)     Hyperlipidemia     Hypertension     malignant / last assessed 6/16/17    IFG (impaired fasting glucose)     last assessed 6/16/17     Family History   Problem Relation Age of Onset    Early death Mother     Heart attack Mother      acute MI    Heart attack Sister     Heart disease Sister      CABG    Coronary artery disease Sister     Diabetes Brother     Cancer Family      malignant neoplasm of urinary bladder         Review of Systems   Constitutional: Negative for appetite change, chills, fever and unexpected weight change  HENT: Negative for congestion, ear pain, rhinorrhea, trouble swallowing and voice change  09/2017 ENT evaluation for sudden hearing loss right ear  MRI of brain at that time showed no evidence of acoustic neuroma  Small retention cyst left maxillary sinus  1 5 cm frontal meningioma  She was seen and evaluated by neuro surgery  Eyes: Negative for visual disturbance  Respiratory: Negative for cough, chest tightness, shortness of breath and wheezing  Cardiovascular: Negative for chest pain, palpitations and leg swelling           07/2017 stress echocardiogram no stress-induced ischemia  No regional wall motion abnormalities  Ejection fraction 60%  Gastrointestinal: Negative for abdominal pain, blood in stool, constipation, diarrhea, nausea and vomiting  GERD stable on omeprazole 20 mg daily  No reflux  No dysphagia  Colonoscopy 12/2015   Endocrine:          07/2017 DEXA scan T-score -1 7 left hip consistent with low bone mineral density  Genitourinary: Negative for difficulty urinating and dysuria  Overactive bladder with improvement on Ditropan at [facility]  Nocturia 1-2  Occasional stress incontinence  Musculoskeletal: Positive for arthralgias  Negative for myalgias  X-rays left knee 01/2018 showed moderate narrowing of the medial joint compartment and small joint effusion  Skin: Negative for rash  Neurological: Negative for dizziness, weakness and headaches  Hematological: Negative for adenopathy  Does not bruise/bleed easily  Psychiatric/Behavioral: Negative for dysphoric mood and sleep disturbance  /74 (BP Location: Left arm, Patient Position: Sitting, Cuff Size: Standard)   Pulse 74   Temp (!) 96 1 °F (35 6 °C) (Tympanic)   Resp 16   Ht 4' 10 5" (1 486 m)   Wt 65 kg (143 lb 6 4 oz)   BMI 29 46 kg/m²          Physical Exam   Constitutional: She is oriented to person, place, and time  She appears well-developed and well-nourished  HENT:   Right Ear: Tympanic membrane normal    Left Ear: Tympanic membrane normal    Mouth/Throat: Oropharynx is clear and moist    Eyes: Conjunctivae, EOM and lids are normal  Pupils are equal, round, and reactive to light  No scleral icterus  Funduscopic exam normal   Neck: Normal range of motion  No JVD present  No tracheal deviation present  No thyromegaly present  Cardiovascular: Normal rate, regular rhythm, normal heart sounds and intact distal pulses  Exam reveals no gallop  No murmur heard  Pulses:       Carotid pulses are 2+ on the right side, and 2+ on the left side  Pulmonary/Chest: Effort normal and breath sounds normal  No respiratory distress  She has no wheezes  She has no rales  Abdominal: Soft  Bowel sounds are normal  She exhibits no distension and no mass  There is no hepatosplenomegaly  There is no tenderness  There is no rebound and no guarding  Musculoskeletal: Normal range of motion  She exhibits no edema, tenderness or deformity  Lymphadenopathy:     She has no cervical adenopathy  Neurological: She is alert and oriented to person, place, and time  She has normal reflexes  No cranial nerve deficit  Skin: Skin is warm and dry  No rash noted  Psychiatric: She has a normal mood and affect  Nursing note and vitals reviewed        Recent Results (from the past 336 hour(s))   CBC and differential    Collection Time: 02/14/18  8:29 AM   Result Value Ref Range    WBC 6 63 4 31 - 10 16 Thousand/uL    RBC 4 77 3 81 - 5 12 Million/uL    Hemoglobin 14 5 11 5 - 15 4 g/dL    Hematocrit 43 1 34 8 - 46 1 %    MCV 90 82 - 98 fL    MCH 30 4 26 8 - 34 3 pg    MCHC 33 6 31 4 - 37 4 g/dL    RDW 13 3 11 6 - 15 1 %    MPV 12 3 8 9 - 12 7 fL    Platelets 238 941 - 517 Thousands/uL    nRBC 0 /100 WBCs    Neutrophils Relative 43 43 - 75 %    Lymphocytes Relative 48 (H) 14 - 44 %    Monocytes Relative 7 4 - 12 %    Eosinophils Relative 2 0 - 6 %    Basophils Relative 0 0 - 1 %    Neutrophils Absolute 2 84 1 85 - 7 62 Thousands/µL    Lymphocytes Absolute 3 15 0 60 - 4 47 Thousands/µL    Monocytes Absolute 0 45 0 17 - 1 22 Thousand/µL    Eosinophils Absolute 0 16 0 00 - 0 61 Thousand/µL    Basophils Absolute 0 02 0 00 - 0 10 Thousands/µL   Comprehensive metabolic panel    Collection Time: 02/14/18  8:29 AM   Result Value Ref Range    Sodium 143 136 - 145 mmol/L    Potassium 3 8 3 5 - 5 3 mmol/L    Chloride 108 100 - 108 mmol/L    CO2 25 21 - 32 mmol/L    Anion Gap 10 4 - 13 mmol/L    BUN 13 5 - 25 mg/dL    Creatinine 0 59 (L) 0 60 - 1 30 mg/dL    Glucose, Fasting 131 (H) 65 - 99 mg/dL    Calcium 9 0 8 3 - 10 1 mg/dL    AST 60 (H) 5 - 45 U/L    ALT 92 (H) 12 - 78 U/L    Alkaline Phosphatase 155 (H) 46 - 116 U/L    Total Protein 7 6 6 4 - 8 2 g/dL    Albumin 3 7 3 5 - 5 0 g/dL    Total Bilirubin 0 57 0 20 - 1 00 mg/dL    eGFR 92 ml/min/1 73sq m   Hemoglobin A1c    Collection Time: 02/14/18  8:29 AM   Result Value Ref Range    Hemoglobin A1C 6 9 (H) 4 2 - 6 3 %     mg/dl   Lipid panel    Collection Time: 02/14/18  8:29 AM   Result Value Ref Range    Cholesterol 142 50 - 200 mg/dL    Triglycerides 159 (H) <=150 mg/dL    HDL, Direct 33 (L) 40 - 60 mg/dL    LDL Calculated 77 0 - 100 mg/dL   Microalbumin / creatinine urine ratio    Collection Time: 02/14/18  8:29 AM   Result Value Ref Range    Creatinine, Ur 110 0 mg/dL    Microalbum  ,U,Random 24 4 (H) 0 0 - 20 0 mg/L    Microalb Creat Ratio 22 0 - 30 mg/g creatinine   TSH, 3rd generation    Collection Time: 02/14/18  8:29 AM Result Value Ref Range    TSH 3RD GENERATON 1 520 0 358 - 3 740 uIU/mL   Vitamin D 25 hydroxy    Collection Time: 02/14/18  8:29 AM   Result Value Ref Range    Vit D, 25-Hydroxy 46 1 30 0 - 100 0 ng/mL     AWV Clinical     ISAR:   Previous hospitalizations?:  Yes   How many hospitalizations have you had in the last year?:  1-2   Additional Comments:  Hypertension 05/2017       Once in a Lifetime Medicare Screening:   EKG performed?:  No    AAA screening performed? (if performed, please add date to Health Maintenance):  No       Medicare Screening Tests and Risk Assessment:   AAA Risk Assessment     Tobacco use (males only):  No   Age over 72 (males only):  No Family history of AAA:  No   Osteoporosis Risk Assessment     Female:  Yes   :  Yes :  No   Age over 48:  Yes Low body weight (<127lbs):  No   Tobacco use:  No Alcohol use:  No   Low calcium diet:  No PMHX of fractures:  Yes   FHX of fractures:  No    HIV Risk Assessment    None indicated:  Yes        Drug and Alcohol Use:   Tobacco use    Cigarettes:  never smoker    Tobacco use duration    Tobacco Cessation Readiness    Alcohol use    Alcohol use:  never    Alcohol Treatment Readiness   Illicit Drug Use    Drug use:  never        Diet & Exercise:   Diet   What is your diet?:  Regular, Frequent junk food   How many servings a day of the following:   Fruits and Vegetables:  1-2 Meat:  1-2   Whole Grains:  2    Dairy:  1 Soda:  0   Coffee:  2 Tea:  1   Exercise    Do you currently exercise?:  yes    Frequency:  occasional    Minutes per week:  90    Type of exercise:  walking       Cognitive Impairment Screening:   Depression screening preformed:  Yes Depression screen score:  0    PHQ-9 Depression scale score:  0   Depression screening results:  negative for symptoms   Cognitive Impairment Screening    Do you have difficulty learning or retaining new information?:  No    Do you have difficulty with reasoning?:  No        Functional Ability/Level of Safety:   Hearing    Hearing difficulties:  Yes    Left:  normal Right:  slightly decreased   Hearing Impairment Assessment    Hearing status:  Hearing loss in right ear   Current Activities    Status:  unlimited ADL's, unlimited social activities, unlimited driving, unlimited IADL's   Help needed with the folllowing:    ADL    Fall Risk   Have you fallen in the last 12 months?:  No Are you unsteady on your feet?:  No    Are you taking any medications that may cause fatigue or dizziness?:  Yes   Do you have any chronic conditions that may contribute to a fall?:  Diabetes Do you rush to the bathroom potentially risking a fall?:  No   Injury History    Urinary Incontinence:  Yes       Home Safety:   Are there hazards in your environment?:  No   Home Safety Risk Factors       Advanced Directives:   Advanced Directives    Living Will:  Yes Durable POA for healthcare: Yes   Advanced directive:  Yes    Patient's End of Life Decisions    Reviewed with patient:  No Provider agrees with end of life decisions:  No       Urinary Incontinence:       Glaucoma:           Provider Screening     Preventative Screening/Counseling:   Cardiovascular Screening/Counseling:   (Labs Q5 years, EKG optional one-time)   General:  Screening Not Indicated Counseling:  Healthy Diet, Healthy Weight          Diabetes Screening/Counseling:   (2 tests/year if Pre-Diabetes or 1 test/year if no Diabetes)   General:  Screening Current           Colorectal Cancer Screening/Counseling:   (FOBT Q1 yr; Flex Sig Q4 yrs or Q10 yrs after Screening Colonoscopy; Screening Colonoscpy Q2 yrs High Risk or Q10 yrs Low Risk; Barium Enema Q2 yrs High Risk or Q4 yrs Low Risk)   General:  Screening Current           Prostate Cancer Screening/Counseling:   (Annual)          Breast Cancer Screening/Counseling:   (Baseline Age 28 - 43;  Annual Age 36+)   General:  Screening Current          Cervical Cancer Screening/Counseling:   (Annual for High Risk or Childbearing Age with Abnormal Pap in Last 3 yrs; Every 2 all others)   General:  Screening Not Indicated           Osteoporosis Screening/Counseling:   (Every 2 Yrs if at risk or more if medically necessary)   General:  Screening Current           AAA Screening/Counseling:   (Once per Lifetime with risk factors)    Family History of AAA:  No Age over 72 (males only):  No Tobacco use (males only):  No   General:  Screening Not Indicated           Glaucoma Screening/Counseling:   (Annual)   General:  Screening Current          HIV Screening/Counseling:   (Voluntary; Once annually for high risk OR 3 times for Pregnancy at diagnosis of IUP; 3rd trimester; and at Labor   General:  Screening Not Indicated           Hepatitis C Screening:             Immunizations:   Influenza (annual):  Patient Declines   Pneumococcal (Once in a Lifetime):  Lifetime Vaccine Completed   Zostavax (Medicare D Coverage, Pt >66 yo):  Zostavax Vaccine UTD   Tdap (Non-Medicare Wellness Visit required):   Tdap Vaccine UTD       Other Preventative Couseling (Non-Medicare Wellness Visit Required):   nutrition counseling performed, weight reduction was discussed, Increased physical activity counseling given       Referrals (Non-Medicare Wellness Visit Required):   Gynecology       Medical Equipment/Suppliers:           Health Maintenance   Topic Date Due    OPHTHALMOLOGY EXAM  06/25/2017    INFLUENZA VACCINE  09/01/2017    SLP PLAN OF CARE  03/25/2018    URINE MICROALBUMIN  02/14/2019    Fall Risk  02/23/2019    Depression Screening PHQ-9  02/23/2019    Urinary Incontinence Screening  02/23/2019    GLAUCOMA SCREENING 67+ YR  02/23/2019    Diabetic Foot Exam  02/23/2019    DTaP,Tdap,and Td Vaccines (4 - Td) 02/23/2028    Hepatitis C Screening  Addressed    PNEUMOCOCCAL POLYSACCHARIDE VACCINE AGE 72 AND OVER  Addressed

## 2018-04-11 ENCOUNTER — OFFICE VISIT (OUTPATIENT)
Dept: OBGYN CLINIC | Facility: MEDICAL CENTER | Age: 73
End: 2018-04-11
Payer: COMMERCIAL

## 2018-04-11 VITALS
HEART RATE: 73 BPM | BODY MASS INDEX: 30.65 KG/M2 | WEIGHT: 149.2 LBS | DIASTOLIC BLOOD PRESSURE: 84 MMHG | SYSTOLIC BLOOD PRESSURE: 146 MMHG | RESPIRATION RATE: 18 BRPM

## 2018-04-11 DIAGNOSIS — M17.12 PRIMARY OSTEOARTHRITIS OF LEFT KNEE: Primary | ICD-10-CM

## 2018-04-11 PROCEDURE — 20610 DRAIN/INJ JOINT/BURSA W/O US: CPT | Performed by: ORTHOPAEDIC SURGERY

## 2018-04-11 PROCEDURE — 99213 OFFICE O/P EST LOW 20 MIN: CPT | Performed by: ORTHOPAEDIC SURGERY

## 2018-04-11 RX ADMIN — BUPIVACAINE HYDROCHLORIDE 2 ML: 2.5 INJECTION, SOLUTION INFILTRATION; PERINEURAL at 10:05

## 2018-04-11 RX ADMIN — BETAMETHASONE SODIUM PHOSPHATE AND BETAMETHASONE ACETATE 6 MG: 3; 3 INJECTION, SUSPENSION INTRA-ARTICULAR; INTRALESIONAL; INTRAMUSCULAR; SOFT TISSUE at 10:05

## 2018-04-11 NOTE — PROGRESS NOTES
67 y o female who presents in continuing care for left knee osteoarthritis  She was given a corticosteroid injection to the left knee in January and has experienced significant pain relief  She does not want surgery at this time  She states that when pain becomes severe, she takes a Tylenol arthritis formula which provided significant pain relief  She also does a leg extension exercises at home    She denies adverse reaction from previous injection area    Review of Systems  Review of systems negative unless otherwise specified in HPI    Past Medical History  Past Medical History:   Diagnosis Date    Endometriosis     GERD (gastroesophageal reflux disease)     Hyperlipidemia     Hypertension     malignant / last assessed 6/16/17    IFG (impaired fasting glucose)     last assessed 6/16/17       Past Surgical History  Past Surgical History:   Procedure Laterality Date    BUNIONECTOMY Right 2010    COLONOSCOPY      complete    HYSTERECTOMY      total abdominal with b/l ovary removal    KNEE CARTILAGE SURGERY Right     theraputic       Current Medications  Current Outpatient Prescriptions on File Prior to Visit   Medication Sig Dispense Refill    acetaminophen (TYLENOL) 500 mg tablet Take 500 mg by mouth every 6 (six) hours as needed for mild pain      amLODIPine (NORVASC) 5 mg tablet Take 1 tablet (5 mg total) by mouth every evening for 90 days 90 tablet 3    aspirin 81 mg chewable tablet Chew 81 mg daily      atorvastatin (LIPITOR) 40 mg tablet Take 1 tablet (40 mg total) by mouth daily for 90 days 90 tablet 3    benzonatate (TESSALON PERLES) 100 mg capsule Take 1 capsule (100 mg total) by mouth 3 (three) times a day as needed for cough 20 capsule 0    cholecalciferol (VITAMIN D3) 1,000 units tablet Take 2,000 Units by mouth daily      fluticasone (FLONASE) 50 mcg/act nasal spray 1 spray into each nostril daily 16 g 0    metoprolol succinate (TOPROL-XL) 50 mg 24 hr tablet Take 1 tablet (50 mg total) by mouth daily for 90 days 90 tablet 3    Multiple Vitamin (MULTIVITAMIN) tablet Take 1 tablet by mouth daily      Multiple Vitamins-Minerals (CENTRUM SILVER PO) Take 1 tablet by mouth daily      Omega-3 Fatty Acids (FISH OIL PO) Take by mouth      omeprazole (PriLOSEC) 20 mg delayed release capsule Take 20 mg by mouth daily      oxybutynin (DITROPAN-XL) 5 mg 24 hr tablet Take 5 mg by mouth daily       No current facility-administered medications on file prior to visit  Recent Labs [facility])    0  Lab Value Date/Time   HCT 43 1 02/14/2018 0829   HCT 43 4 10/29/2015 0943   HGB 14 5 02/14/2018 0829   HGB 14 8 10/29/2015 0943   WBC 6 63 02/14/2018 0829   WBC 6 10 10/29/2015 0943   INR 0 88 06/10/2017 0458   ESR 23 (H) 12/05/2017 0728   GLUCOSE 121 06/11/2017 0454   GLUCOSE 125 10/29/2015 0943   HGBA1C 6 9 (H) 02/14/2018 0829   HGBA1C 6 6 (H) 10/29/2015 0943         Physical exam  · General: Awake, Alert, Oriented  ·   Left knee:  Crepitus appreciated throughout range of motion  Medial and lateral joint line tenderness  No effusion  Extremity warm well perfused  Calf and thigh soft and compressible  Active range of motion is 0-110 degrees    Imaging  Plain films of the left knee obtained at previous visit reveal advanced degenerative changes of left knee  Procedure  Large joint arthrocentesis  Date/Time: 4/11/2018 10:05 AM  Consent given by: patient  Supporting Documentation  Indications: pain   Procedure Details  Location: knee - L knee  Needle size: 22 G  Approach: anteromedial  Medications administered: 2 mL bupivacaine 0 25 %; 6 mg betamethasone acetate-betamethasone sodium phosphate 6 (3-3) mg/mL    Dressing:  Sterile dressing applied      Assessment/Plan:   67 y  o female the patient is osteoarthritis of left, currently well controlled with non operative modalities including injection therapy and oral Tylenol    Patient currently interested in total knee arthroplasty  Plan  Weightbearing as tolerated left lower extremity  May continue taking Tylenol as needed for pain  Corticosteroid injection administered to left knee today  Follow-up in 3 months for consideration of repeat injection therapy      Yasmani Peterson  04/11/18

## 2018-04-12 RX ORDER — BETAMETHASONE SODIUM PHOSPHATE AND BETAMETHASONE ACETATE 3; 3 MG/ML; MG/ML
6 INJECTION, SUSPENSION INTRA-ARTICULAR; INTRALESIONAL; INTRAMUSCULAR; SOFT TISSUE
Status: COMPLETED | OUTPATIENT
Start: 2018-04-11 | End: 2018-04-11

## 2018-04-12 RX ORDER — BUPIVACAINE HYDROCHLORIDE 2.5 MG/ML
2 INJECTION, SOLUTION INFILTRATION; PERINEURAL
Status: COMPLETED | OUTPATIENT
Start: 2018-04-11 | End: 2018-04-11

## 2018-04-26 DIAGNOSIS — Z12.31 ENCOUNTER FOR SCREENING MAMMOGRAM FOR MALIGNANT NEOPLASM OF BREAST: ICD-10-CM

## 2018-05-10 ENCOUNTER — TELEPHONE (OUTPATIENT)
Dept: NEUROSURGERY | Facility: CLINIC | Age: 73
End: 2018-05-10

## 2018-05-10 DIAGNOSIS — D32.9 MENINGIOMA (HCC): Primary | ICD-10-CM

## 2018-06-03 DIAGNOSIS — N32.81 OAB (OVERACTIVE BLADDER): Primary | ICD-10-CM

## 2018-06-03 RX ORDER — OXYBUTYNIN CHLORIDE 5 MG/1
TABLET, EXTENDED RELEASE ORAL
Qty: 30 TABLET | Refills: 5 | Status: SHIPPED | OUTPATIENT
Start: 2018-06-03 | End: 2018-12-27 | Stop reason: SDUPTHER

## 2018-06-05 ENCOUNTER — APPOINTMENT (OUTPATIENT)
Dept: LAB | Facility: CLINIC | Age: 73
End: 2018-06-05
Payer: COMMERCIAL

## 2018-06-05 ENCOUNTER — TRANSCRIBE ORDERS (OUTPATIENT)
Dept: LAB | Facility: CLINIC | Age: 73
End: 2018-06-05

## 2018-06-05 DIAGNOSIS — Z01.810 PRE-OPERATIVE CARDIOVASCULAR EXAMINATION: ICD-10-CM

## 2018-06-05 DIAGNOSIS — D32.9 BENIGN NEOPLASM OF MENINGES (HCC): Primary | ICD-10-CM

## 2018-06-05 LAB
BUN SERPL-MCNC: 15 MG/DL (ref 5–25)
CREAT SERPL-MCNC: 0.73 MG/DL (ref 0.6–1.3)
GFR SERPL CREATININE-BSD FRML MDRD: 83 ML/MIN/1.73SQ M

## 2018-06-05 PROCEDURE — 82565 ASSAY OF CREATININE: CPT

## 2018-06-05 PROCEDURE — 84520 ASSAY OF UREA NITROGEN: CPT

## 2018-06-05 PROCEDURE — 36415 COLL VENOUS BLD VENIPUNCTURE: CPT

## 2018-06-11 ENCOUNTER — HOSPITAL ENCOUNTER (OUTPATIENT)
Dept: MRI IMAGING | Facility: HOSPITAL | Age: 73
Discharge: HOME | End: 2018-06-11
Payer: COMMERCIAL

## 2018-06-11 DIAGNOSIS — D32.9 MENINGIOMA (HCC): ICD-10-CM

## 2018-06-11 PROCEDURE — 70553 MRI BRAIN STEM W/O & W/DYE: CPT

## 2018-06-11 PROCEDURE — A9585 GADOBUTROL INJECTION: HCPCS | Performed by: PHYSICIAN ASSISTANT

## 2018-06-11 RX ADMIN — GADOBUTROL 6 ML: 604.72 INJECTION INTRAVENOUS at 11:49

## 2018-06-14 ENCOUNTER — TELEPHONE (OUTPATIENT)
Dept: NEUROSURGERY | Facility: CLINIC | Age: 73
End: 2018-06-14

## 2018-06-14 DIAGNOSIS — I63.81 LACUNAR INFARCTION (HCC): Primary | ICD-10-CM

## 2018-06-14 NOTE — TELEPHONE ENCOUNTER
----- Message from Jeniffer Quinn MD sent at 6/13/2018  5:41 PM EDT -----  Fine with me  Thanks    ----- Message -----  From: Guille Mcbride PA-C  Sent: 6/13/2018   5:05 PM  To: Jeniffer Quinn MD    Ms Ofelia Anderson has f/u appointment with our office 7/9/18 for f/u of meningioma  She had requested MRI brain completed in anticipation of her follow up appointment  MRI brain reports "7 mm focus of vague diffusion abnormality in the deep white matter of the right frontoparietal junction essentially recent lacunar infarct"  She does not appear to follow with Neurology  May I place referral for Neurology evaluation?

## 2018-06-15 NOTE — TELEPHONE ENCOUNTER
Mckinley Macias returned my call  Discussed MRI brain with patient -- specifically reports of vague diffusion abnormality in deep white matter right frontoparietal junction suggesting recent lacunar infarct  Patient denies any neurological complaints  She reports she is doing well although did mention she recently saw an eye doctor for a stye  Reports she takes Aspirin 81mg daily  Recommend she pursue Neurology evaluation for stroke risk stratification management  Patient in agreement to pursue Neurology evaluation  Patient informed to contact our office or present to Emergency Room if she experiences neurological change  Patient expressed understanding and agreement  Order for Neurology evaluation placed  Please facilitate referral to Neurology  Thank you

## 2018-06-18 DIAGNOSIS — I10 ESSENTIAL HYPERTENSION: ICD-10-CM

## 2018-06-18 RX ORDER — AMLODIPINE BESYLATE 5 MG/1
5 TABLET ORAL EVERY EVENING
Qty: 90 TABLET | Refills: 3 | Status: SHIPPED | OUTPATIENT
Start: 2018-06-18 | End: 2018-10-15

## 2018-07-09 ENCOUNTER — OFFICE VISIT (OUTPATIENT)
Dept: NEUROSURGERY | Facility: CLINIC | Age: 73
End: 2018-07-09
Payer: COMMERCIAL

## 2018-07-09 VITALS
SYSTOLIC BLOOD PRESSURE: 140 MMHG | BODY MASS INDEX: 29.03 KG/M2 | RESPIRATION RATE: 20 BRPM | WEIGHT: 144 LBS | HEIGHT: 59 IN | HEART RATE: 59 BPM | TEMPERATURE: 97.8 F | DIASTOLIC BLOOD PRESSURE: 80 MMHG

## 2018-07-09 DIAGNOSIS — R90.0 INTRACRANIAL MASS: Primary | ICD-10-CM

## 2018-07-09 DIAGNOSIS — D32.9 MENINGIOMA (HCC): ICD-10-CM

## 2018-07-09 DIAGNOSIS — Z01.818 PREPROCEDURAL EXAMINATION: ICD-10-CM

## 2018-07-09 DIAGNOSIS — Z86.73 HISTORY OF CEREBRAL INFARCTION: ICD-10-CM

## 2018-07-09 PROCEDURE — 99214 OFFICE O/P EST MOD 30 MIN: CPT | Performed by: PHYSICIAN ASSISTANT

## 2018-07-09 RX ORDER — ATORVASTATIN CALCIUM 40 MG/1
40 TABLET, FILM COATED ORAL DAILY
COMMUNITY
End: 2020-02-12

## 2018-07-09 NOTE — PROGRESS NOTES
Assessment/Plan:     Diagnoses and all orders for this visit:    Intracranial mass  -     MRI brain with and without contrast; Future  -     BUN; Future  -     Creatinine, serum; Future    Meningioma (HCC)  -     MRI brain with and without contrast; Future  -     BUN; Future  -     Creatinine, serum; Future    History of cerebral infarction    Preprocedural examination  -     BUN; Future  -     Creatinine, serum; Future        Discussion / Summary: This is a 67 y o  female who presents for follow up of small left frontal extra-axial mass (presumed meningioma) which was incidentally identified on MRI brain/IAC during workup for acute onset of right ear hearing loss in October 2017  The MRI Brain (6/11/18) was reviewed in detail by Dr Denise Medrano and the left frontal extra-axial mass is stable in comparison to prior MRI brain 11/20/17  No adjacent parenchymal edema  The 6/11/18 MRI Brain also reports a chronic lacunar infarct right centrum semiovale but also a focus of a diffusion abnormality in the deep white matter of the the right frontoparietal junction which may represent subacute to chronic lacunar infarct  MRI priscila findings were discussed with patient and her daughter  Neurosurgical intervention is not advised at this juncture  Follow up MRI brain w/wo contrast in 6-12 months is advised for continued surveillance of the left frontal extra-axial mass  After discussion with patient and her daughter patient wishes to follow up in approximately 8 months  She is to have BUN/Creatinine labs completed a few days prior to MRI brain  Ms Tova Parks has a pending appointment with Dr Amrik Villalobos at [place] [address] [facility] on 7/17/18 for stroke risk stratification management in the setting of lacunar infarcts  Patient is encouraged to pursue Neurology evaluation  Patient reports she takes Aspirin 81mg daily  She is also on medication for blood pressure and cholesterol management     Recommend patient follow up with her PCP to make sure her blood pressure, cholesterol, and blood sugars are optimally controlled  Patient reports he has follow up with Dr Naz Ricks of ENT next week with whom she follow with for history of right ear hearing loss since Oct  2017  Patient advised to call 911 and present to hospital Emergency Room if experience worsening or new headaches, or experience seizure, mental status change, speech / vision change, sensory / motor change, or other neurological change  These findings and recommendations were discussed in detail with patient and her daughter  Patient and her daughter expressed understanding and agreement      ________________________________________________________________________  Subjective:      Patient ID: Nikolay Dooley is a 67 y o  female who  presents for follow up of small left frontal extra-axial mass (presumed meningioma) which was incidentally identified on MRI brain/IAC during workup for acute onset of right ear hearing loss in October 2017  Ms Marlen Hoover underwent f/u MRI brain 6/11/18  She is accompanied at office visit by her daughter  HPI     In review --patient experienced sudden onset of right ear hearing loss at the end of October 2017  She report dizziness and 2 days of nausea/vomiting at onset of right ear hearing loss in October 2017  It was previously noted that she sought evaluation with Dr Naz Ricks (ENT) for evaluation shortly after her symptoms began in according to patient she was advised she might have had a viral bacterial infection of her right ear possibly Meniere's disease reports she was placed on a 13 day course of prednisone  She denied being placed on an antibiotic  Patient reported her right ear hearing loss improved some but did not return back to her pre-existing baseline prior to the development the right ear hearing loss in October 2017    Patient had indicated Dr Naz Ricks referred her for MRI Brain/IAC for evaluation of the right ear hearing loss  MRI brain/IAC (11/20/17) reported no CP angle mass or abnormal enhancement of IC  However the MRI brain did demonstrate a small dural-based extra-axial mass within the left anterior medial frontal region consistent with meningioma  No significant mass effect and no adjacent edema  She referred to our office for evaluation of the presumed meningioma  She was seen in neurosurgical consultation 1/19/18 and recommended follow up in 4 months (ie  = about 6 months from initial MRI brain 11/2017)  Patient pursued follow up MRI brain 6/11/18  Our office had been in contact with patient as the f/u MRI brain noted a possible subacute to chronic lacunar infarct near right frontoparietal junction region  She was subsequently referred to Neurology for stroke risk stratification management  She is scheduled to see Dr Renny Corey of Neurology 7/17/18  Patient reports she takes Aspirin 81mg daily  She is also on medication for blood pressure and cholesterol management  Patient reports she has been cautious of diet with reports of "borderline" diabetes previously  Patient denies headache or dizziness  Patient denies nausea or vomiting since episode when she began with acute onset occur right ear hearing loss in October 2017  She reports she still has hearing loss of her right ear which she feels waxes/ wanes  Reports she sometimes notices a " crackle " sound right ear  Patient reports she has follow up with Dr Ramiro Zee of ENT next week  She denies left ear hearing difficulty  She denies seizure  Patient reports she completes her ADLs and executive functions independent without difficulty  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  Patient denies any change in memory or cognitive function  Patient reports she may notice some blurry vision if she takes her glasses off but otherwise denies any blurry vision with her eye glasses or on    She denies diplopia  She reports she follows with eye care specialist      She denies numbness, tingling, weakness  She ambulates independent  She reports history of occasional left knee pain although indicated she was not interested in left knee replacement in past         The following portions of the patient's history were reviewed and updated as appropriate: allergies, current medications, past family history, past medical history, past social history and past surgical history  Review of Systems   Constitutional: Negative for fever  HENT: Positive for hearing loss (right ear)  Eyes:        See HPI   Respiratory: Negative for shortness of breath  Cardiovascular: Negative for chest pain  Gastrointestinal: Negative for nausea and vomiting  Musculoskeletal: Negative for gait problem  Neurological: Negative for dizziness, seizures, weakness and headaches  Psychiatric/Behavioral: Negative for agitation  Objective:      /80 (BP Location: Left arm, Patient Position: Sitting, Cuff Size: Standard)   Pulse 59   Temp 97 8 °F (36 6 °C) (Tympanic)   Resp 20   Ht 4' 10 5" (1 486 m)   Wt 65 3 kg (144 lb)   BMI 29 58 kg/m²          Physical Exam   Constitutional: She is oriented to person, place, and time  She appears well-developed and well-nourished  No distress  HENT:   Mouth/Throat: Oropharynx is clear and moist    Eyes: Conjunctivae and EOM are normal  Pupils are equal, round, and reactive to light  Pulmonary/Chest: Effort normal    Neurological: She is alert and oriented to person, place, and time  She has a normal Finger-Nose-Finger Test  Tandem gait test: Preforms although mildly extends distance between heel / toe towards end  Gait normal    Psychiatric: She has a normal mood and affect  Her speech is normal        Neurologic Exam     Mental Status   Oriented to person, place, and time     Speech: speech is normal   Level of consciousness: alert    Alert, oriented 3, thought content appropriate  GCS15  Briskly follows commands  Id's pen, the ink, and function to write correct  Id's colors x 3 correct  Names [place] cities in [place] correct  Counts fingers correct  Shows correct number fingers both hands  Names [place] presidents x 4 in descending order correct        Cranial Nerves     CN III, IV, VI   Pupils are equal, round, and reactive to light  Extraocular motions are normal      CN V   Facial sensation intact  CN VII   Facial expression full, symmetric  CN VIII   Hearing impaired: Finger rub identified on left but not on right  Whisper voice -- correct on left but incorrect on right  CN IX, X   Palate: symmetric    CN XI   Right sternocleidomastoid strength: normal  Left sternocleidomastoid strength: normal  Right trapezius strength: normal  Left trapezius strength: normal    CN XII   Tongue: not atrophic  Fasciculations: absent  Tongue deviation: none    Motor Exam   Right arm pronator drift: absent  Left arm pronator drift: absent    Strength   Right deltoid: 5/5  Left deltoid: 5/5  Right biceps: 5/5  Left biceps: 5/5  Right triceps: 5/5  Left triceps: 5/5  Right wrist flexion: 5/5  Left wrist flexion: 5/5  Right wrist extension: 5/5  Left wrist extension: 5/5  Right interossei: 5/5  Left interossei: 5/5  Right iliopsoas: 5/5  Left iliopsoas: 5/5  Right quadriceps: 5/5  Left quadriceps: 5/5  Right hamstrin/5  Left hamstrin/5  Right anterior tibial: 5/5  Left anterior tibial: 5/5  Right gastroc: 5/5  Left gastroc: 5/5    Sensory Exam   Light touch normal      Gait, Coordination, and Reflexes     Gait  Gait: normal (Independent)    Coordination   Finger to nose coordination: normal  Tandem gait test: Preforms although mildly extends distance between heel / toe towards end  Tremor   Resting tremor: absent  Intention tremor: absent  Action tremor: absent  Able to preform JONELLE fingers bilaterally         Imaging study:     18 [facility] MRI Brain w/wo contrast -- per report : "MRI BRAIN WITH AND WITHOUT CONTRAST     INDICATION: Follow-up meningioma      COMPARISON:  November 20, 2017     TECHNIQUE:  Sagittal T1, axial T2, axial FLAIR, axial T1, axial [place], axial diffusion  Sagittal, axial T1 postcontrast   Axial bravo postcontrast with coronal reconstructions        IV Contrast:  6 mL of gadobutrol injection (MULTI-DOSE)      IMAGE QUALITY:   Diagnostic      FINDINGS:     BRAIN PARENCHYMA:  Foci of T2 and FLAIR signal abnormality in periventricular and subcortical white matter similar to prior study compatible with moderate chronic small vessel ischemic changes  Chronic lacunar infarct right centrum semiovale  There is   a focus of vague diffusion abnormality in the deep white matter of the right frontoparietal junction without ADC restriction  This may represent subacute to chronic lacunar infarct  No abnormal enhancement  Enhancing left frontal extra-axial mass is   stable in size from the prior study compatible with meningioma measuring 1 4 x 0 7 x 1 5 cm      VENTRICLES:  Normal      SELLA AND PITUITARY GLAND:  Normal      ORBITS:  Normal      PARANASAL SINUSES:  Normal      VASCULATURE:  Evaluation of the major intracranial vasculature demonstrates appropriate flow voids      CALVARIUM AND SKULL BASE:  Normal      EXTRACRANIAL SOFT TISSUES:  Normal      IMPRESSION:     7 mm focus of vague diffusion abnormality in the deep white matter of the right frontoparietal junction essentially recent lacunar infarct      Left frontal lobe extra-axial enhancing mass compatible with meningioma measures 1 4 x 0 7 x 1 5 cm and is stable from prior study    No adjacent parenchymal edema      Scattered moderate chronic microangiopathic changes      Workstation performed: [id number] "

## 2018-07-09 NOTE — LETTER
July 9, 2018     Kacy Rose MD  [address] [place] [address]    Patient: Garr Severe   YOB: 1945   Date of Visit: 7/9/2018       Dear Dr Jonatan Smith:    Thank you for referring Brooks Conner to me for evaluation  Below are my notes for this consultation  If you have questions, please do not hesitate to call me  I look forward to following your patient along with you  Sincerely,        Ankit Pagan MD        CC: No Recipients  Ramone Odell PA-C  7/9/2018  6:36 PM  Sign at close encounter  Assessment/Plan:     Diagnoses and all orders for this visit:    Intracranial mass  -     MRI brain with and without contrast; Future  -     BUN; Future  -     Creatinine, serum; Future    Meningioma (HCC)  -     MRI brain with and without contrast; Future  -     BUN; Future  -     Creatinine, serum; Future    History of cerebral infarction    Preprocedural examination  -     BUN; Future  -     Creatinine, serum; Future        Discussion / Summary: This is a 67 y o  female who presents for follow up of small left frontal extra-axial mass (presumed meningioma) which was incidentally identified on MRI brain/IAC during workup for acute onset of right ear hearing loss in October 2017  The MRI Brain (6/11/18) was reviewed in detail by Dr Meghana Akbar and the left frontal extra-axial mass is stable in comparison to prior MRI brain 11/20/17  No adjacent parenchymal edema  The 6/11/18 MRI Brain also reports a chronic lacunar infarct right centrum semiovale but also a focus of a diffusion abnormality in the deep white matter of the the right frontoparietal junction which may represent subacute to chronic lacunar infarct  MRI priscila findings were discussed with patient and her daughter  Neurosurgical intervention is not advised at this juncture  Follow up MRI brain w/wo contrast in 6-12 months is advised for continued surveillance of the left frontal extra-axial mass     After discussion with patient and her daughter patient wishes to follow up in approximately 8 months  She is to have BUN/Creatinine labs completed a few days prior to MRI brain  Ms Sina Keyes has a pending appointment with Dr Ryan Keller at [facility] on 7/17/18 for stroke risk stratification management in the setting of lacunar infarcts  Patient is encouraged to pursue Neurology evaluation  Patient reports she takes Aspirin 81mg daily  She is also on medication for blood pressure and cholesterol management  Recommend patient follow up with her PCP to make sure her blood pressure, cholesterol, and blood sugars are optimally controlled  Patient reports he has follow up with Dr Paulette Hernandez of ENT next week with whom she follow with for history of right ear hearing loss since Oct  2017  Patient advised to call 911 and present to hospital Emergency Room if experience worsening or new headaches, or experience seizure, mental status change, speech / vision change, sensory / motor change, or other neurological change  These findings and recommendations were discussed in detail with patient and her daughter  Patient and her daughter expressed understanding and agreement      ________________________________________________________________________  Subjective:      Patient ID: Christiano An is a 67 y o  female who  presents for follow up of small left frontal extra-axial mass (presumed meningioma) which was incidentally identified on MRI brain/IAC during workup for acute onset of right ear hearing loss in October 2017  Ms Sina Keyes underwent f/u MRI brain 6/11/18  She is accompanied at office visit by her daughter  HPI     In review --patient experienced sudden onset of right ear hearing loss at the end of October 2017  She report dizziness and 2 days of nausea/vomiting at onset of right ear hearing loss in October 2017    It was previously noted that she sought evaluation with Dr Paulette Hernandez (ENT) for evaluation shortly after her symptoms began in according to patient she was advised she might have had a viral bacterial infection of her right ear possibly Meniere's disease reports she was placed on a 13 day course of prednisone  She denied being placed on an antibiotic  Patient reported her right ear hearing loss improved some but did not return back to her pre-existing baseline prior to the development the right ear hearing loss in October 2017  Patient had indicated Dr Ramiro Zee referred her for MRI Brain/IAC for evaluation of the right ear hearing loss  MRI brain/IAC (11/20/17) reported no CP angle mass or abnormal enhancement of IC  However the MRI brain did demonstrate a small dural-based extra-axial mass within the left anterior medial frontal region consistent with meningioma  No significant mass effect and no adjacent edema  She referred to our office for evaluation of the presumed meningioma  She was seen in neurosurgical consultation 1/19/18 and recommended follow up in 4 months (ie  = about 6 months from initial MRI brain 11/2017)  Patient pursued follow up MRI brain 6/11/18  Our office had been in contact with patient as the f/u MRI brain noted a possible subacute to chronic lacunar infarct near right frontoparietal junction region  She was subsequently referred to Neurology for stroke risk stratification management  She is scheduled to see Dr Renny Corey of Neurology 7/17/18  Patient reports she takes Aspirin 81mg daily  She is also on medication for blood pressure and cholesterol management  Patient reports she has been cautious of diet with reports of "borderline" diabetes previously  Patient denies headache or dizziness  Patient denies nausea or vomiting since episode when she began with acute onset occur right ear hearing loss in October 2017  She reports she still has hearing loss of her right ear which she feels waxes/ wanes    Reports she sometimes notices a " crackle " sound right ear  Patient reports she has follow up with Dr Madeleine Escalante of ENT next week  She denies left ear hearing difficulty  She denies seizure  Patient reports she completes her ADLs and executive functions independent without difficulty  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  Patient denies any change in memory or cognitive function  Patient reports she may notice some blurry vision if she takes her glasses off but otherwise denies any blurry vision with her eye glasses or on  She denies diplopia  She reports she follows with eye care specialist      She denies numbness, tingling, weakness  She ambulates independent  She reports history of occasional left knee pain although indicated she was not interested in left knee replacement in past         The following portions of the patient's history were reviewed and updated as appropriate: allergies, current medications, past family history, past medical history, past social history and past surgical history  Review of Systems   Constitutional: Negative for fever  HENT: Positive for hearing loss (right ear)  Eyes:        See HPI   Respiratory: Negative for shortness of breath  Cardiovascular: Negative for chest pain  Gastrointestinal: Negative for nausea and vomiting  Musculoskeletal: Negative for gait problem  Neurological: Negative for dizziness, seizures, weakness and headaches  Psychiatric/Behavioral: Negative for agitation  Objective:      /80 (BP Location: Left arm, Patient Position: Sitting, Cuff Size: Standard)   Pulse 59   Temp 97 8 °F (36 6 °C) (Tympanic)   Resp 20   Ht 4' 10 5" (1 486 m)   Wt 65 3 kg (144 lb)   BMI 29 58 kg/m²           Physical Exam   Constitutional: She is oriented to person, place, and time  She appears well-developed and well-nourished  No distress     HENT:   Mouth/Throat: Oropharynx is clear and moist    Eyes: Conjunctivae and EOM are normal  Pupils are equal, round, and reactive to light  Pulmonary/Chest: Effort normal    Neurological: She is alert and oriented to person, place, and time  She has a normal Finger-Nose-Finger Test  Tandem gait test: Preforms although mildly extends distance between heel / toe towards end  Gait normal    Psychiatric: She has a normal mood and affect  Her speech is normal        Neurologic Exam     Mental Status   Oriented to person, place, and time  Speech: speech is normal   Level of consciousness: alert    Alert, oriented 3, thought content appropriate  GCS15  Briskly follows commands  Id's pen, the ink, and function to write correct  Id's colors x 3 correct  Names 3 cities in [place] correct  Counts fingers correct  Shows correct number fingers both hands  Names [place] presidents x 4 in descending order correct        Cranial Nerves     CN III, IV, VI   Pupils are equal, round, and reactive to light  Extraocular motions are normal      CN V   Facial sensation intact  CN VII   Facial expression full, symmetric  CN VIII   Hearing impaired: Finger rub identified on left but not on right  Whisper voice -- correct on left but incorrect on right       CN IX, X   Palate: symmetric    CN XI   Right sternocleidomastoid strength: normal  Left sternocleidomastoid strength: normal  Right trapezius strength: normal  Left trapezius strength: normal    CN XII   Tongue: not atrophic  Fasciculations: absent  Tongue deviation: none    Motor Exam   Right arm pronator drift: absent  Left arm pronator drift: absent    Strength   Right deltoid: 5/5  Left deltoid: 5/5  Right biceps: 5/5  Left biceps: 5/5  Right triceps: 5/5  Left triceps: 5/5  Right wrist flexion: 5/5  Left wrist flexion: 5/5  Right wrist extension: 5/5  Left wrist extension: 5/5  Right interossei: 5/5  Left interossei: 5/5  Right iliopsoas: 5/5  Left iliopsoas: 5/5  Right quadriceps: 5/5  Left quadriceps: 5/5  Right hamstrin/5  Left hamstring: 5/5  Right anterior tibial: 5/5  Left anterior tibial: 5/5  Right gastroc: 5/5  Left gastroc: 5/5    Sensory Exam   Light touch normal      Gait, Coordination, and Reflexes     Gait  Gait: normal (Independent)    Coordination   Finger to nose coordination: normal  Tandem gait test: Preforms although mildly extends distance between heel / toe towards end  Tremor   Resting tremor: absent  Intention tremor: absent  Action tremor: absent  Able to preform JONELLE fingers bilaterally  Imaging study:     6/11/18 [facility] MRI Brain w/wo contrast -- per report : "MRI BRAIN WITH AND WITHOUT CONTRAST     INDICATION: Follow-up meningioma      COMPARISON:  November 20, 2017     TECHNIQUE:  Sagittal T1, axial T2, axial FLAIR, axial T1, axial [place], axial diffusion  Sagittal, axial T1 postcontrast   Axial bravo postcontrast with coronal reconstructions        IV Contrast:  6 mL of gadobutrol injection (MULTI-DOSE)      IMAGE QUALITY:   Diagnostic      FINDINGS:     BRAIN PARENCHYMA:  Foci of T2 and FLAIR signal abnormality in periventricular and subcortical white matter similar to prior study compatible with moderate chronic small vessel ischemic changes  Chronic lacunar infarct right centrum semiovale  There is   a focus of vague diffusion abnormality in the deep white matter of the right frontoparietal junction without ADC restriction  This may represent subacute to chronic lacunar infarct  No abnormal enhancement     Enhancing left frontal extra-axial mass is   stable in size from the prior study compatible with meningioma measuring 1 4 x 0 7 x 1 5 cm      VENTRICLES:  Normal      SELLA AND PITUITARY GLAND:  Normal      ORBITS:  Normal      PARANASAL SINUSES:  Normal      VASCULATURE:  Evaluation of the major intracranial vasculature demonstrates appropriate flow voids      CALVARIUM AND SKULL BASE:  Normal      EXTRACRANIAL SOFT TISSUES:  Normal      IMPRESSION:     7 mm focus of vague diffusion abnormality in the deep white matter of the right frontoparietal junction essentially recent lacunar infarct      Left frontal lobe extra-axial enhancing mass compatible with meningioma measures 1 4 x 0 7 x 1 5 cm and is stable from prior study    No adjacent parenchymal edema      Scattered moderate chronic microangiopathic changes      Workstation performed: [id number] "

## 2018-07-09 NOTE — PATIENT INSTRUCTIONS
Encourage you to pursue Neurology consultation as already arranged for stroke risk stratification management  Have BUN/Creatinine labs completed a few days prior to MRI brain  Call 911 and present to hospital Emergency Room if experience worsening or new headaches, or experience seizure, mental status change, speech / vision change, sensory / motor change, or other neurological change  Recommend you follow up with you Primary Care Provider to make sure your blood pressure, cholesterol, and blood sugars are optimally controlled

## 2018-07-17 ENCOUNTER — OFFICE VISIT (OUTPATIENT)
Dept: NEUROLOGY | Facility: CLINIC | Age: 73
End: 2018-07-17
Payer: COMMERCIAL

## 2018-07-17 VITALS
WEIGHT: 144.2 LBS | HEART RATE: 69 BPM | DIASTOLIC BLOOD PRESSURE: 78 MMHG | SYSTOLIC BLOOD PRESSURE: 136 MMHG | BODY MASS INDEX: 30.27 KG/M2 | HEIGHT: 58 IN

## 2018-07-17 DIAGNOSIS — I63.81 LACUNAR INFARCTION (HCC): ICD-10-CM

## 2018-07-17 DIAGNOSIS — D32.9 MENINGIOMA (HCC): ICD-10-CM

## 2018-07-17 DIAGNOSIS — I10 ESSENTIAL HYPERTENSION: Primary | ICD-10-CM

## 2018-07-17 PROCEDURE — 99205 OFFICE O/P NEW HI 60 MIN: CPT | Performed by: PSYCHIATRY & NEUROLOGY

## 2018-07-17 RX ORDER — CLOPIDOGREL BISULFATE 75 MG/1
75 TABLET ORAL DAILY
Qty: 30 TABLET | Refills: 4 | Status: SHIPPED | OUTPATIENT
Start: 2018-07-17 | End: 2019-01-04 | Stop reason: SDUPTHER

## 2018-07-17 NOTE — PROGRESS NOTES
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  I personally reviewed the MRI brain with and without contrast which did show the left frontal meningioma and subacute infarct on the right  She does have hypertension, and diabetes  Patient does not smoke  Patient does have a 1 5cm left frontal meningioma which she is following neurosurgery for and currently no neurosurgical intervention at this point and will continue with surveillance  Etiology of the stroke is likely small vessel since patient has risk factors and also based on the location of the stroke  She has been having sensorineural hearing loss on the right and she went and see ENT and was diagnosed with viral infection but now is improved    PLAN:  For secondary stroke prevention, will continue with combination of plavix and atorvastatin, she was taking aspirin regularly prior to the stroke  Continue   Will obtain CTA head and neck to see if there are any vascular abnormalities  Continue  with BP control, goal < 130/80, her BP is a little elevated today, but has been elevated she states, will need to have PCP adjust her medications  Continue to monitor DM and appropriate Euglycemic control, last A1c is 6 9 from 2/18, will repeat A1c today   Defer to PCP regarding DM and BP management  Patient not smoking  I would like to follow up in 3 months, I would be happy to see her sooner if the need arises  Patient/Guardian was advised to the call the office if they have any questions and concerns in the meantime       Patient/Guardian does understand that if they have any new stroke like symptoms such as facial droop on one side, weakness/paralysis on either side, speech trouble, numbness on one side, balance issues, any vision changes, or any new headache, to call 9-1-1 immediately or to proceed to the nearest ER immediately  Problem List Items Addressed This Visit     Essential hypertension - Primary    Meningioma ([facility] [address] )    Lacunar infarction ([facility] [address] )    Relevant Medications    clopidogrel (PLAVIX) 75 mg tablet    Other Relevant Orders    CTA head and neck w wo contrast    Hemoglobin A1C    Echo complete with contrast if indicated             Subjective:    HPI    This is a 68 y/o Female who is here as a new patient for evaluation of subacute infarct in the right semi-centrum ovale infarct  She was seen by neurosurgery for evaluation of meningioma and MRI brain with and without contrast was done  Patient had a stable MRI in the past and in comparison from 11/20/17 - no change in the MRI  Patient was advised to get another repeat MRI brain in 6-12 months  She is here today  She is doing well  She felt confused, few weeks ago but no weakness, no numbness, no other symptoms  She does not smoke  She says she does not take medications for DM  No family history of strokes  She felt tired and felt like "hit by a truck"  She is compliant with all of her medications  She has been compliant with aspirin  She started hearing loss in October 2017 which prompted MRI brain and she was found to have a left frontal meningioma and was then was recommended to see neurosurgery  The following portions of the patient's history were reviewed and updated as appropriate:   She  has a past medical history of Endometriosis; GERD (gastroesophageal reflux disease); Hyperlipidemia; Hypertension; and IFG (impaired fasting glucose)    She   Patient Active Problem List    Diagnosis Date Noted    Lacunar infarction ([facility] [address] ) 07/17/2018    Intracranial mass 07/09/2018    Primary osteoarthritis of left knee 01/11/2018    Meningioma ([facility] [address] ) 12/06/2017    Sudden-onset sensorineural hearing loss 12/06/2017    Rosacea 09/12/2017    Low bone mass 08/23/2017    Vitamin D deficiency 08/23/2017    Diabetes mellitus, type 2 ([facility] [address] ) 06/16/2017    Essential hypertension     Mixed hyperlipidemia     Gastroesophageal reflux disease     OAB (overactive bladder) 05/29/2016     She  has a past surgical history that includes Hysterectomy; Bunionectomy (Right, 2010); Knee cartilage surgery (Right); and Colonoscopy  Her family history includes Cancer in her family; Coronary artery disease in her sister; Diabetes in her brother; Early death in her mother; Heart attack in her mother and sister; Heart disease in her sister  She  reports that she has never smoked  She has never used smokeless tobacco  She reports that she does not drink alcohol or use drugs  Current Outpatient Prescriptions   Medication Sig Dispense Refill    acetaminophen (TYLENOL) 500 mg tablet Take 500 mg by mouth every 6 (six) hours as needed for mild pain      amLODIPine (NORVASC) 5 mg tablet Take 1 tablet (5 mg total) by mouth every evening for 90 days 90 tablet 3    atorvastatin (LIPITOR) 40 mg tablet Take 40 mg by mouth daily      cholecalciferol (VITAMIN D3) 1,000 units tablet Take 2,000 Units by mouth daily      metoprolol succinate (TOPROL-XL) 50 mg 24 hr tablet Take 1 tablet (50 mg total) by mouth daily for 90 days 90 tablet 3    Multiple Vitamin (MULTIVITAMIN) tablet Take 1 tablet by mouth daily      Multiple Vitamins-Minerals (CENTRUM SILVER PO) Take 1 tablet by mouth daily      Omega-3 Fatty Acids (FISH OIL PO) Take by mouth      omeprazole (PriLOSEC) 20 mg delayed release capsule Take 20 mg by mouth daily      oxybutynin (DITROPAN-XL) 5 mg 24 hr tablet take 1 tablet by mouth once daily 30 tablet 5    clopidogrel (PLAVIX) 75 mg tablet Take 1 tablet (75 mg total) by mouth daily 30 tablet 4     No current facility-administered medications for this visit        Current Outpatient Prescriptions on File Prior to Visit   Medication Sig    acetaminophen (TYLENOL) 500 mg tablet Take 500 mg by mouth every 6 (six) hours as needed for mild pain    amLODIPine (NORVASC) 5 mg tablet Take 1 tablet (5 mg total) by mouth every evening for 90 days    atorvastatin (LIPITOR) 40 mg tablet Take 40 mg by mouth daily    cholecalciferol (VITAMIN D3) 1,000 units tablet Take 2,000 Units by mouth daily    metoprolol succinate (TOPROL-XL) 50 mg 24 hr tablet Take 1 tablet (50 mg total) by mouth daily for 90 days    Multiple Vitamin (MULTIVITAMIN) tablet Take 1 tablet by mouth daily    Multiple Vitamins-Minerals (CENTRUM SILVER PO) Take 1 tablet by mouth daily    Omega-3 Fatty Acids (FISH OIL PO) Take by mouth    omeprazole (PriLOSEC) 20 mg delayed release capsule Take 20 mg by mouth daily    oxybutynin (DITROPAN-XL) 5 mg 24 hr tablet take 1 tablet by mouth once daily    [DISCONTINUED] aspirin 81 mg chewable tablet Chew 81 mg daily     No current facility-administered medications on file prior to visit  She is allergic to penicillins            Objective:    Blood pressure 136/78, pulse 69, height 4' 10" (1 473 m), weight 65 4 kg (144 lb 3 2 oz)  Physical Exam  General: Patient is not in any acute distress  HEENT: normocephalic, atraumatic  Neck: supple  Heart: regular heart rate and rhythm, no murmurs, rubs and or gallops  Chest: Clear to auscultation bilaterally  Abdomen: soft and non-tender  Skin: no lesions  Musculosketal: no bony abnormalities  Neurologic Examination:   Mental status: alert, awake, and following commands  Speech: Speech is fluent without any dysarthria, no aphasia noted  Cranial Nerves: Pupils equal round reactive to light and extraocular movements intact  Visual fields full to confrontation  Facial sensation intact to soft touch in V1, V2 and V3 distributions  Face symmetric  Tongue midline, able to move side to side  Palate elevation symmetric uvula midline, no deviation noted  Shoulder shrug strong  Motor: Strength 5/5 in all 4 extremities  No drift  Normal rapid alternating movements  Normal tone     Sensory: Sensation intact to soft touch, vibration and pinprick in all 4 extremities  Cerebellar: Finger-to-nose intact, normal heel to shin  Reflexes: 2+ in all 4 extremities, downgoing toes bilaterally  Gait: Normal gait, tandem walking, normal arm to swing  Neurological Exam      ROS:    Review of Systems   Constitutional: Negative  Negative for appetite change and fever  HENT: Negative  Negative for hearing loss, tinnitus, trouble swallowing and voice change  Eyes: Negative  Negative for photophobia and pain  Respiratory: Negative  Negative for shortness of breath  Cardiovascular: Negative  Negative for palpitations  Gastrointestinal: Negative  Negative for nausea and vomiting  Endocrine: Negative  Negative for cold intolerance and heat intolerance  Genitourinary: Negative  Negative for dysuria, frequency and urgency  Musculoskeletal: Negative  Negative for myalgias and neck pain  Skin: Negative  Negative for rash  Neurological: Negative  Negative for dizziness, tremors, seizures, syncope, facial asymmetry, speech difficulty, weakness, light-headedness, numbness and headaches  Hematological: Negative  Does not bruise/bleed easily  Psychiatric/Behavioral: Negative  Negative for confusion, hallucinations and sleep disturbance

## 2018-07-18 ENCOUNTER — OFFICE VISIT (OUTPATIENT)
Dept: OBGYN CLINIC | Facility: MEDICAL CENTER | Age: 73
End: 2018-07-18
Payer: COMMERCIAL

## 2018-07-18 ENCOUNTER — TELEPHONE (OUTPATIENT)
Dept: NEUROLOGY | Facility: CLINIC | Age: 73
End: 2018-07-18

## 2018-07-18 VITALS
SYSTOLIC BLOOD PRESSURE: 133 MMHG | WEIGHT: 143.4 LBS | HEART RATE: 63 BPM | DIASTOLIC BLOOD PRESSURE: 72 MMHG | BODY MASS INDEX: 29.97 KG/M2

## 2018-07-18 DIAGNOSIS — I63.81 LACUNAR INFARCTION (HCC): Primary | ICD-10-CM

## 2018-07-18 DIAGNOSIS — M17.12 PRIMARY OSTEOARTHRITIS OF LEFT KNEE: Primary | ICD-10-CM

## 2018-07-18 PROCEDURE — 99213 OFFICE O/P EST LOW 20 MIN: CPT | Performed by: ORTHOPAEDIC SURGERY

## 2018-07-18 RX ORDER — PANTOPRAZOLE SODIUM 40 MG/1
40 TABLET, DELAYED RELEASE ORAL DAILY
Qty: 30 TABLET | Refills: 3 | Status: SHIPPED | OUTPATIENT
Start: 2018-07-18 | End: 2018-11-01 | Stop reason: SDUPTHER

## 2018-07-18 NOTE — TELEPHONE ENCOUNTER
Actually patient should not be taking both plavix and omeprazole  I can switch her to Pantoprazole which is ok with plavix  Let me know if she is ok with that and I will send her a script

## 2018-07-18 NOTE — TELEPHONE ENCOUNTER
Pt called and states that she was started on Plavix yesterday  She is currently taking omeprazole prescribed by her ENT  She states that she read an article and says that she should check w/ the doctor before taking Plavix while taking omeprazole  "Is it safe to take Plavix and omeprazole together?"  She states that she will not take the Plavix until she hears from us   [facility] advise          [phone]

## 2018-07-18 NOTE — TELEPHONE ENCOUNTER
She is agreeable to switching to pantoprazole (made her aware that she can't take both plavix and omeprazole)  She wants to know when she should take the plavix since she already took the omeprazole today (6 am this morning)

## 2018-07-18 NOTE — PROGRESS NOTES
Assessment/Plan:  Assessment/Plan   25-year-old female with left knee DJD  Steroid injections from her last visit did help her symptoms  She at this time has some mild pain but not enough for an injection  Plan is as follows:    Weightbearing as tolerated    Continue physical therapy exercises    Tylenol p r n  May follow up in 3 months on a as needed basis    Discussed treatment plan with patient and she is in agreement treatment plan  Thank you      Subjective:  Left knee pain      Patient ID:    HPI    Patricia Borden is a 67 y o  female  Patient presenting at this time for follow-up for treatment of left knee DJD  Patient states that she feels much better at this time  She occasionally has some pain over the medial aspect was about to rain all when it is raining  She was recently diagnosed with a stroke and is about to start Plavix  Denies any numbness tingling fevers or chills      The following portions of the patient's history were reviewed and updated as appropriate: current medications  Review of Systems   Constitutional: Negative for chills, fever and unexpected weight change  HENT: Negative for hearing loss, nosebleeds and postnasal drip  Eyes: Negative for pain, redness and visual disturbance  Respiratory: Negative for cough, shortness of breath and wheezing  Cardiovascular: Negative for chest pain, palpitations and leg swelling  Gastrointestinal: Negative for abdominal pain, nausea and vomiting  Endocrine: Negative for polydipsia and polyuria  Genitourinary: Negative for dysuria  Musculoskeletal: Positive for arthralgias  Skin: Negative for rash and wound  Neurological: Negative for dizziness, numbness and headaches  Psychiatric/Behavioral: Negative for decreased concentration and suicidal ideas  The patient is not nervous/anxious           Objective:  Vitals:    07/18/18 0900   BP: 133/72   Pulse: 63   Weight: 65 kg (143 lb 6 4 oz)     Past Medical History: Diagnosis Date    Endometriosis     GERD (gastroesophageal reflux disease)     Hyperlipidemia     Hypertension     malignant / last assessed 6/16/17    IFG (impaired fasting glucose)     last assessed 6/16/17     Past Surgical History:   Procedure Laterality Date    BUNIONECTOMY Right 2010    COLONOSCOPY      complete    HYSTERECTOMY      total abdominal with b/l ovary removal    KNEE CARTILAGE SURGERY Right     theraputic       Social History     Social History    Marital status:       Spouse name: N/A    Number of children: 4    Years of education: less than high school     Occupational History    retired      Social History Main Topics    Smoking status: Never Smoker    Smokeless tobacco: Never Used    Alcohol use No    Drug use: No    Sexual activity: Not on file     Other Topics Concern    Not on file     Social History Narrative    Daily coffee consumption: 2 cp/day    Exercising regularly    living alone     Family History   Problem Relation Age of Onset    Early death Mother     Heart attack Mother         acute MI    Heart attack Sister     Heart disease Sister         CABG    Coronary artery disease Sister     Diabetes Brother     Cancer Family         malignant neoplasm of urinary bladder         Current Outpatient Prescriptions:     acetaminophen (TYLENOL) 500 mg tablet, Take 500 mg by mouth every 6 (six) hours as needed for mild pain, Disp: , Rfl:     amLODIPine (NORVASC) 5 mg tablet, Take 1 tablet (5 mg total) by mouth every evening for 90 days, Disp: 90 tablet, Rfl: 3    atorvastatin (LIPITOR) 40 mg tablet, Take 40 mg by mouth daily, Disp: , Rfl:     cholecalciferol (VITAMIN D3) 1,000 units tablet, Take 2,000 Units by mouth daily, Disp: , Rfl:     clopidogrel (PLAVIX) 75 mg tablet, Take 1 tablet (75 mg total) by mouth daily, Disp: 30 tablet, Rfl: 4    Multiple Vitamin (MULTIVITAMIN) tablet, Take 1 tablet by mouth daily, Disp: , Rfl:     Omega-3 Fatty Acids (FISH OIL PO), Take by mouth, Disp: , Rfl:     omeprazole (PriLOSEC) 20 mg delayed release capsule, Take 20 mg by mouth daily, Disp: , Rfl:     oxybutynin (DITROPAN-XL) 5 mg 24 hr tablet, take 1 tablet by mouth once daily, Disp: 30 tablet, Rfl: 5    metoprolol succinate (TOPROL-XL) 50 mg 24 hr tablet, Take 1 tablet (50 mg total) by mouth daily for 90 days, Disp: 90 tablet, Rfl: 3     Allergies   Allergen Reactions    Penicillins Rash     Ortho Exam    Physical Exam   Constitutional: She is oriented to person, place, and time  She appears well-developed and well-nourished  HENT:   Right Ear: External ear normal    Left Ear: External ear normal    Eyes: Conjunctivae and EOM are normal  Pupils are equal, round, and reactive to light  Neck: Normal range of motion  Cardiovascular: Normal rate and regular rhythm  Pulmonary/Chest: Effort normal    Neurological: She is alert and oriented to person, place, and time  Skin: Skin is warm and dry  Psychiatric: She has a normal mood and affect   Her behavior is normal  Thought content normal      Left knee:    No abrasions, no open wounds  Mild medial joint line tenderness, no lateral joint line tenderness,  Minimal effusion  Stable to coronal sagittal plane stress  Neurologically and vascularly intact distally

## 2018-07-19 ENCOUNTER — APPOINTMENT (OUTPATIENT)
Dept: LAB | Facility: CLINIC | Age: 73
End: 2018-07-19
Payer: COMMERCIAL

## 2018-07-19 DIAGNOSIS — I63.81 LACUNAR INFARCTION (HCC): ICD-10-CM

## 2018-07-19 LAB
EST. AVERAGE GLUCOSE BLD GHB EST-MCNC: 126 MG/DL
HBA1C MFR BLD: 6 % (ref 4.2–6.3)

## 2018-07-19 PROCEDURE — 36415 COLL VENOUS BLD VENIPUNCTURE: CPT

## 2018-07-19 PROCEDURE — 83036 HEMOGLOBIN GLYCOSYLATED A1C: CPT

## 2018-07-31 ENCOUNTER — HOSPITAL ENCOUNTER (OUTPATIENT)
Dept: CT IMAGING | Facility: HOSPITAL | Age: 73
Discharge: HOME | End: 2018-07-31
Attending: PSYCHIATRY & NEUROLOGY
Payer: COMMERCIAL

## 2018-07-31 DIAGNOSIS — I63.81 LACUNAR INFARCTION (HCC): ICD-10-CM

## 2018-07-31 PROCEDURE — 70496 CT ANGIOGRAPHY HEAD: CPT

## 2018-07-31 PROCEDURE — 70498 CT ANGIOGRAPHY NECK: CPT

## 2018-07-31 RX ADMIN — IOHEXOL 85 ML: 350 INJECTION, SOLUTION INTRAVENOUS at 16:08

## 2018-08-24 ENCOUNTER — OFFICE VISIT (OUTPATIENT)
Dept: FAMILY MEDICINE CLINIC | Facility: CLINIC | Age: 73
End: 2018-08-24
Payer: COMMERCIAL

## 2018-08-24 VITALS
HEART RATE: 78 BPM | WEIGHT: 141 LBS | RESPIRATION RATE: 16 BRPM | DIASTOLIC BLOOD PRESSURE: 64 MMHG | TEMPERATURE: 98.2 F | SYSTOLIC BLOOD PRESSURE: 122 MMHG | BODY MASS INDEX: 29.47 KG/M2

## 2018-08-24 DIAGNOSIS — D32.9 MENINGIOMA (HCC): ICD-10-CM

## 2018-08-24 DIAGNOSIS — E11.9 TYPE 2 DIABETES MELLITUS WITHOUT COMPLICATION, WITHOUT LONG-TERM CURRENT USE OF INSULIN (HCC): Primary | ICD-10-CM

## 2018-08-24 DIAGNOSIS — I63.81 LACUNAR INFARCTION (HCC): ICD-10-CM

## 2018-08-24 DIAGNOSIS — E78.2 MIXED HYPERLIPIDEMIA: ICD-10-CM

## 2018-08-24 DIAGNOSIS — I10 ESSENTIAL HYPERTENSION: ICD-10-CM

## 2018-08-24 PROBLEM — R90.0 INTRACRANIAL MASS: Status: RESOLVED | Noted: 2018-07-09 | Resolved: 2018-08-24

## 2018-08-24 PROCEDURE — 99214 OFFICE O/P EST MOD 30 MIN: CPT | Performed by: FAMILY MEDICINE

## 2018-08-24 NOTE — PROGRESS NOTES
Assessment/Plan:     Diagnoses and all orders for this visit:    Type 2 diabetes mellitus without complication, without long-term current use of insulin (HCC)    Essential hypertension  -     CBC and differential  -     Comprehensive metabolic panel    Mixed hyperlipidemia  -     Lipid panel    Lacunar infarction (HCC)    Meningioma (HCC)        Continue with current medications  Diet weight loss and exercise  Repeat labs flu vaccine in the fall  She has a follow-up with Neurology October 2018 office visit 6 months         Patient ID: Catarino Le is a 67 y o  female  Follow-up visit  Medications reviewed  Hypertension  Blood pressure stable on Amlodipine 5 mg daily and Metoprolol ER 50 mg daily  07/2018 creatinine 0 73  ER admission 06/2017 for accelerated hypertension  07/2017 renal artery Dopplers no renal artery stenosis  Type 2 diabetes mellitus diet controlled  07/2018  A1c 6 0 decreased from  6 9  02/2018  Urine microalbumin 24 4  Not on ACE or ARB  No neuropathy symptoms  Current with eye exam   Hyperlipidemia mixed type on Atorvastatin 40 mg daily  Lipid profile 02/2018  Cholesterol 142  Triglycerides 159  HDL 33  LDL 77  LFTs normal   Except for AST 60, ALT 92 and alkaline phosphatase 155  TSH 1 520  The following portions of the patient's history were reviewed and updated as appropriate: allergies, current medications, past family history, past medical history, past social history, past surgical history and problem list     Review of Systems   Constitutional: Negative for appetite change, chills, fever and unexpected weight change  HENT: Negative for congestion, ear pain, rhinorrhea, trouble swallowing and voice change  09/2017 ENT evaluation for sudden hearing loss right ear  MRI of brain at that time showed no evidence of acoustic neuroma  Small retention cyst left maxillary sinus  1 5 cm frontal meningioma  She was seen and evaluated by neuro surgery  Eyes: Negative for visual disturbance  Respiratory: Negative for cough, shortness of breath and wheezing  Cardiovascular: Negative for chest pain, palpitations and leg swelling           07/2017 stress echocardiogram no stress-induced ischemia  No regional wall motion abnormalities  Ejection fraction 60%  Gastrointestinal: Negative for abdominal pain, blood in stool, constipation, diarrhea, nausea and vomiting  GERD stable on omeprazole 20 mg daily  No reflux  No dysphagia  Colonoscopy 12/2015   Endocrine:          07/2017 DEXA scan T-score -1 7 left hip consistent with low bone mineral density  Genitourinary: Negative for difficulty urinating and dysuria  Overactive bladder with improvement on Ditropan at [facility]  Nocturia 1-2  Occasional stress incontinence  Musculoskeletal: Positive for arthralgias  Negative for myalgias  X-rays left knee 01/2018 showed moderate narrowing of the medial joint compartment and small joint effusion  Skin: Negative for rash  Neurological: Negative for dizziness and headaches  See ALCON MATIAS 06/2018 follow-up MRI brain for meningioma left frontal lobe meningioma stable from prior exam scattered moderate chronic microangiopathic changes  7 mm focus of vague diffusion abnormality in the deep white matter of the right frontal parietal junction may represent subacute to chronic lacunar infarct  Chronic lacunar infarct right centrum semi ovale  07/2018 CTA no acute intracranial disease  Foot printed of chronic small vessel disease  Small stable anterior left frontal fossa meningioma without mass effect  No large vessel flow restrictive disease within the head or neck  Incidental 12 mm hypodense left thyroid nodule  Patient was switched from ASA to Plavix   Hematological: Negative for adenopathy  Does not bruise/bleed easily  Psychiatric/Behavioral: Negative for dysphoric mood and sleep disturbance           Objective:      /64 Pulse 78   Temp 98 2 °F (36 8 °C)   Resp 16   Wt 64 kg (141 lb)   BMI 29 47 kg/m²          Physical Exam   Constitutional: She is oriented to person, place, and time  She appears well-developed and well-nourished  HENT:   Right Ear: Tympanic membrane normal    Left Ear: Tympanic membrane normal    Mouth/Throat: Oropharynx is clear and moist    Eyes: Conjunctivae, EOM and lids are normal  Pupils are equal, round, and reactive to light  No scleral icterus  Neck: No JVD present  Carotid bruit is not present  No tracheal deviation present  No thyroid mass and no thyromegaly present  Cardiovascular: Normal rate, regular rhythm and normal heart sounds  Exam reveals no gallop  No murmur heard  Pulses:       Carotid pulses are 2+ on the right side, and 2+ on the left side  Pulmonary/Chest: Effort normal and breath sounds normal  No respiratory distress  She has no wheezes  She has no rales  Abdominal: Soft  Bowel sounds are normal  She exhibits no distension and no mass  There is no hepatosplenomegaly  There is no tenderness  There is no rebound and no guarding  Musculoskeletal: Normal range of motion  She exhibits no edema  Lymphadenopathy:     She has no cervical adenopathy  Neurological: She is alert and oriented to person, place, and time  She has normal strength and normal reflexes  No cranial nerve deficit  Gait normal    Skin: No rash noted  Psychiatric: She has a normal mood and affect  Nursing note and vitals reviewed  Procedure: Cta Head And Neck W Wo Contrast    Result Date: 8/1/2018  Narrative: CTA NECK AND BRAIN WITH AND WITHOUT CONTRAST INDICATION: I63 9: Cerebral infarction, unspecified COMPARISON:   MR 6/11/2018, CT 6/10/2017 TECHNIQUE:  Routine CT imaging of the Brain without contrast   Post contrast imaging was performed after administration of iodinated contrast through the neck and brain  Post contrast axial 0 625 mm images timed to opacify the arterial system    3D rendering was performed on an independent workstation  MIP reconstructions performed  Coronal reconstructions were performed of the noncontrast portion of the brain  Radiation dose length product (DLP) for this visit:  [phone] mGy-cm   This examination, like all CT scans performed in the [facility], was performed utilizing techniques to minimize radiation dose exposure, including the use of iterative reconstruction and automated exposure control  IV Contrast:  85 mL of iohexol (OMNIPAQUE)  IMAGE QUALITY:   Diagnostic FINDINGS: NONCONTRAST BRAIN PARENCHYMA:  No acute disease  No acute ischemia, intraparenchymal mass, mass effect, edema or hemorrhage  [place] documented left  Anterior frontal fossa meningioma, approximately a centimeter in greatest linear dimension is apparent only on CTA  This appears similar in size to prior study  Scattered vascular calcifications  Footprint of chronic small vessel disease  VENTRICLES AND EXTRA-AXIAL SPACES:  Normal for patient's age  VISUALIZED ORBITS AND PARANASAL SINUSES:  Unremarkable  CALVARIUM AND EXTRACRANIAL SOFT TISSUES:   Normal  CERVICAL VASCULATURE AORTIC ARCH AND GREAT VESSELS:  Normal aortic arch and great vessel origins  Normal visualized subclavian vessels  RIGHT VERTEBRAL ARTERY CERVICAL SEGMENT:  Normal origin  The vessel is normal in caliber throughout the neck  LEFT VERTEBRAL ARTERY CERVICAL SEGMENT:  Normal origin  The vessel is normal in caliber throughout the neck  RIGHT EXTRACRANIAL CAROTID SEGMENT:  Normal caliber common carotid artery  Normal bifurcation and cervical internal carotid artery  No stenosis or dissection  Minor calcification at the bifurcation extending into the ICA, no significant stenosis  Minor tandem calcium in the high cervical ICA without stenosis  LEFT EXTRACRANIAL CAROTID SEGMENT:  Normal caliber common carotid artery  Normal bifurcation and cervical internal carotid artery  No stenosis or dissection  Minimal calcification at the bifurcation  Without significant stenosis  Cervical ICA mildly  redundant  NASCET criteria was used to determine the degree of internal carotid artery diameter stenosis  INTRACRANIAL VASCULATURE INTERNAL CAROTID ARTERIES:  Normal enhancement of the intracranial portions of the internal carotid arteries  Normal ophthalmic artery origins  Normal ICA terminus  ANTERIOR CIRCULATION:  Right A1 is dominant, both are patent  Anterior communicating artery is patent     Proximal A2 branches normal  MIDDLE CEREBRAL ARTERY CIRCULATION:  M1 segment and middle cerebral artery branches demonstrate normal enhancement bilaterally  DISTAL VERTEBRAL ARTERIES:  Normal distal vertebral arteries  Posterior inferior cerebellar artery origins are normal  Normal vertebral basilar junction  BASILAR ARTERY:  Basilar artery is normal in caliber  Normal superior cerebellar arteries  POSTERIOR CEREBRAL ARTERIES: Both posterior cerebral arteries arises demonstrate normal enhancement  Normal posterior communicating arteries  Left P-comm is prominent  DURAL VENOUS SINUSES:  Normal  NON VASCULAR ANATOMY BONY STRUCTURES:  No acute osseous abnormality  SOFT TISSUES OF THE NECK: Incidental 12 mm hypodense left thyroid nodule identified on this CT  According to guidelines published in the February 2015 white paper on incidental thyroid nodules in the Journal of the [employer] of Radiology [facility]), because the nodule(s) are less than 1 5 cm in size and without suspicious feature, no further evaluation is recommended  THORACIC INLET:  Unremarkable  Impression: No acute intracranial disease  Footprint of chronic small vessel disease  Stable small anterior left frontal fossa meningioma without mass effect  No large vessel flow restrictive disease within the head or neck   Workstation performed: [id number]     Procedure: Mri Brain With And Without Contrast    Result Date: 6/12/2018  Narrative: MRI BRAIN WITH AND WITHOUT CONTRAST INDICATION: Follow-up meningioma  COMPARISON:  November 20, 2017 TECHNIQUE: Sagittal T1, axial T2, axial FLAIR, axial T1, axial [place], axial diffusion  Sagittal, axial T1 postcontrast   Axial bravo postcontrast with coronal reconstructions  IV Contrast:  6 mL of gadobutrol injection (MULTI-DOSE)  IMAGE QUALITY:   Diagnostic  FINDINGS: BRAIN PARENCHYMA:  Foci of T2 and FLAIR signal abnormality in periventricular and subcortical white matter similar to prior study compatible with moderate chronic small vessel ischemic changes  Chronic lacunar infarct right centrum semiovale  There is a focus of vague diffusion abnormality in the deep white matter of the right frontoparietal junction without ADC restriction  This may represent subacute to chronic lacunar infarct  No abnormal enhancement  Enhancing left frontal extra-axial mass is stable in size from the prior study compatible with meningioma measuring 1 4 x 0 7 x 1 5 cm  VENTRICLES:  Normal  SELLA AND PITUITARY GLAND:  Normal  ORBITS:  Normal  PARANASAL SINUSES:  Normal  VASCULATURE:  Evaluation of the major intracranial vasculature demonstrates appropriate flow voids  CALVARIUM AND SKULL BASE:  Normal  EXTRACRANIAL SOFT TISSUES:  Normal      Impression: 7 mm focus of vague diffusion abnormality in the deep white matter of the right frontoparietal junction essentially recent lacunar infarct  Left frontal lobe extra-axial enhancing mass compatible with meningioma measures 1 4 x 0 7 x 1 5 cm and is stable from prior study  No adjacent parenchymal edema  Scattered moderate chronic microangiopathic changes   Workstation performed: [id number]

## 2018-08-27 ENCOUNTER — HOSPITAL ENCOUNTER (OUTPATIENT)
Dept: NON INVASIVE DIAGNOSTICS | Facility: CLINIC | Age: 73
Discharge: HOME | End: 2018-08-27
Payer: COMMERCIAL

## 2018-08-27 DIAGNOSIS — I63.81 LACUNAR INFARCTION (HCC): ICD-10-CM

## 2018-08-27 PROCEDURE — 93306 TTE W/DOPPLER COMPLETE: CPT | Performed by: INTERNAL MEDICINE

## 2018-08-27 PROCEDURE — 93306 TTE W/DOPPLER COMPLETE: CPT

## 2018-09-13 ENCOUNTER — APPOINTMENT (OUTPATIENT)
Dept: LAB | Facility: CLINIC | Age: 73
End: 2018-09-13
Payer: COMMERCIAL

## 2018-09-13 LAB
ALBUMIN SERPL BCP-MCNC: 3.7 G/DL (ref 3.5–5)
ALP SERPL-CCNC: 111 U/L (ref 46–116)
ALT SERPL W P-5'-P-CCNC: 45 U/L (ref 12–78)
ANION GAP SERPL CALCULATED.3IONS-SCNC: 7 MMOL/L (ref 4–13)
AST SERPL W P-5'-P-CCNC: 29 U/L (ref 5–45)
BASOPHILS # BLD AUTO: 0.05 THOUSANDS/ΜL (ref 0–0.1)
BASOPHILS NFR BLD AUTO: 1 % (ref 0–1)
BILIRUB SERPL-MCNC: 0.52 MG/DL (ref 0.2–1)
BUN SERPL-MCNC: 18 MG/DL (ref 5–25)
CALCIUM SERPL-MCNC: 8.9 MG/DL (ref 8.3–10.1)
CHLORIDE SERPL-SCNC: 105 MMOL/L (ref 100–108)
CHOLEST SERPL-MCNC: 154 MG/DL (ref 50–200)
CO2 SERPL-SCNC: 26 MMOL/L (ref 21–32)
CREAT SERPL-MCNC: 0.72 MG/DL (ref 0.6–1.3)
EOSINOPHIL # BLD AUTO: 0.22 THOUSAND/ΜL (ref 0–0.61)
EOSINOPHIL NFR BLD AUTO: 3 % (ref 0–6)
ERYTHROCYTE [DISTWIDTH] IN BLOOD BY AUTOMATED COUNT: 13.1 % (ref 11.6–15.1)
GFR SERPL CREATININE-BSD FRML MDRD: 84 ML/MIN/1.73SQ M
GLUCOSE P FAST SERPL-MCNC: 130 MG/DL (ref 65–99)
HCT VFR BLD AUTO: 45.6 % (ref 34.8–46.1)
HDLC SERPL-MCNC: 43 MG/DL (ref 40–60)
HGB BLD-MCNC: 14.7 G/DL (ref 11.5–15.4)
IMM GRANULOCYTES # BLD AUTO: 0.02 THOUSAND/UL (ref 0–0.2)
IMM GRANULOCYTES NFR BLD AUTO: 0 % (ref 0–2)
LDLC SERPL CALC-MCNC: 77 MG/DL (ref 0–100)
LYMPHOCYTES # BLD AUTO: 3.89 THOUSANDS/ΜL (ref 0.6–4.47)
LYMPHOCYTES NFR BLD AUTO: 48 % (ref 14–44)
MCH RBC QN AUTO: 30.1 PG (ref 26.8–34.3)
MCHC RBC AUTO-ENTMCNC: 32.2 G/DL (ref 31.4–37.4)
MCV RBC AUTO: 93 FL (ref 82–98)
MONOCYTES # BLD AUTO: 0.59 THOUSAND/ΜL (ref 0.17–1.22)
MONOCYTES NFR BLD AUTO: 8 % (ref 4–12)
NEUTROPHILS # BLD AUTO: 3.12 THOUSANDS/ΜL (ref 1.85–7.62)
NEUTS SEG NFR BLD AUTO: 40 % (ref 43–75)
NONHDLC SERPL-MCNC: 111 MG/DL
NRBC BLD AUTO-RTO: 0 /100 WBCS
PLATELET # BLD AUTO: 182 THOUSANDS/UL (ref 149–390)
PMV BLD AUTO: 12.7 FL (ref 8.9–12.7)
POTASSIUM SERPL-SCNC: 3.6 MMOL/L (ref 3.5–5.3)
PROT SERPL-MCNC: 7.7 G/DL (ref 6.4–8.2)
RBC # BLD AUTO: 4.88 MILLION/UL (ref 3.81–5.12)
SODIUM SERPL-SCNC: 138 MMOL/L (ref 136–145)
TRIGL SERPL-MCNC: 169 MG/DL
WBC # BLD AUTO: 7.89 THOUSAND/UL (ref 4.31–10.16)

## 2018-09-13 PROCEDURE — 80061 LIPID PANEL: CPT | Performed by: FAMILY MEDICINE

## 2018-09-13 PROCEDURE — 85025 COMPLETE CBC W/AUTO DIFF WBC: CPT | Performed by: FAMILY MEDICINE

## 2018-09-13 PROCEDURE — 80053 COMPREHEN METABOLIC PANEL: CPT | Performed by: FAMILY MEDICINE

## 2018-09-13 PROCEDURE — 36415 COLL VENOUS BLD VENIPUNCTURE: CPT | Performed by: FAMILY MEDICINE

## 2018-09-19 ENCOUNTER — TELEPHONE (OUTPATIENT)
Dept: NEUROLOGY | Facility: CLINIC | Age: 73
End: 2018-09-19

## 2018-09-19 NOTE — TELEPHONE ENCOUNTER
Patient is having a routine cleaning on Monday 9/24, and her dentist recommended she inform our office since patient is on Plavix, patient wants the okay from Dr Mitra Jaramillo     Patient is requesting a call back

## 2018-09-24 NOTE — TELEPHONE ENCOUNTER
[facility], states the patient was seen today for a routine cleaning  They are recommending the patient get an extraction (no  32 using local anesthesia)  They are requesting clearance from our office before the patient can be scheduled    [facility]- [phone]

## 2018-09-24 NOTE — TELEPHONE ENCOUNTER
Called the Dental office, they do not have a clearance form, they are just requesting a note stating the patient is cleared  Okay to generate?   Fax to: [phone]

## 2018-09-24 NOTE — TELEPHONE ENCOUNTER
For cleaning? No need to hold plavix unless they want her to hold it  Typically hold it for 5 days if they want it to be held

## 2018-09-26 NOTE — TELEPHONE ENCOUNTER
So im not sure if it should be held, that's upto the dentist if they want it held, Im ok with holding   Thanks please find out if they want it held from the dentist

## 2018-09-27 NOTE — TELEPHONE ENCOUNTER
[facility] called back stating that per dentist, no need to hold Plavix  They just require letter to state pt cleared to have extraction  Signature needed  Letter already generated  Please have [address] fax  Thanks       Fax [phone]

## 2018-10-09 ENCOUNTER — APPOINTMENT (OUTPATIENT)
Dept: RADIOLOGY | Facility: MEDICAL CENTER | Age: 73
End: 2018-10-09
Payer: COMMERCIAL

## 2018-10-09 ENCOUNTER — HOSPITAL ENCOUNTER (OUTPATIENT)
Dept: RADIOLOGY | Facility: HOSPITAL | Age: 73
Discharge: HOME | End: 2018-10-09
Attending: FAMILY MEDICINE
Payer: COMMERCIAL

## 2018-10-09 ENCOUNTER — TRANSCRIBE ORDERS (OUTPATIENT)
Dept: ADMINISTRATIVE | Facility: HOSPITAL | Age: 73
End: 2018-10-09

## 2018-10-09 ENCOUNTER — OFFICE VISIT (OUTPATIENT)
Dept: URGENT CARE | Facility: MEDICAL CENTER | Age: 73
End: 2018-10-09
Payer: COMMERCIAL

## 2018-10-09 ENCOUNTER — TELEPHONE (OUTPATIENT)
Dept: FAMILY MEDICINE CLINIC | Facility: CLINIC | Age: 73
End: 2018-10-09

## 2018-10-09 VITALS
WEIGHT: 141 LBS | HEIGHT: 58 IN | OXYGEN SATURATION: 99 % | TEMPERATURE: 97.9 F | RESPIRATION RATE: 15 BRPM | BODY MASS INDEX: 29.6 KG/M2 | DIASTOLIC BLOOD PRESSURE: 80 MMHG | HEART RATE: 57 BPM | SYSTOLIC BLOOD PRESSURE: 138 MMHG

## 2018-10-09 DIAGNOSIS — S82.435A CLOSED NONDISPLACED OBLIQUE FRACTURE OF SHAFT OF LEFT FIBULA, INITIAL ENCOUNTER: ICD-10-CM

## 2018-10-09 DIAGNOSIS — S93.402A SPRAIN OF LEFT ANKLE, UNSPECIFIED LIGAMENT, INITIAL ENCOUNTER: ICD-10-CM

## 2018-10-09 DIAGNOSIS — S93.402A SPRAIN OF LEFT ANKLE, UNSPECIFIED LIGAMENT, INITIAL ENCOUNTER: Primary | ICD-10-CM

## 2018-10-09 DIAGNOSIS — S82.832A CLOSED FRACTURE OF DISTAL END OF LEFT FIBULA, UNSPECIFIED FRACTURE MORPHOLOGY, INITIAL ENCOUNTER: Primary | ICD-10-CM

## 2018-10-09 DIAGNOSIS — T14.8XXA SPRAIN: Primary | ICD-10-CM

## 2018-10-09 PROCEDURE — 29515 APPLICATION SHORT LEG SPLINT: CPT | Performed by: PHYSICIAN ASSISTANT

## 2018-10-09 PROCEDURE — 73610 X-RAY EXAM OF ANKLE: CPT

## 2018-10-09 PROCEDURE — 99213 OFFICE O/P EST LOW 20 MIN: CPT | Performed by: PHYSICIAN ASSISTANT

## 2018-10-09 NOTE — TELEPHONE ENCOUNTER
Patient called  She twisted her left ankle on Saturday, she has been elevating it and trying to stay off of it, but it is still pretty swollen on left side above her bone   She wants to know if we can order an x-ray for her

## 2018-10-09 NOTE — PROGRESS NOTES
[facility] [employer] [facility]        NAME: Isreal Rivas is a 67 y o  female  : 1945    MRN: [id number]  DATE: 2018  TIME: 4:42 PM    Assessment and Plan   Closed fracture of distal end of left fibula, unspecified fracture morphology, initial encounter [R14 654K]  1  Closed fracture of distal end of left fibula, unspecified fracture morphology, initial encounter  Ambulatory referral to Orthopedic Surgery    Splint application         Patient Instructions       Rest, ice, elevate the affected limb  Take over-the-counter pain medication for symptom relief  Follow up with Orthopedics this week  Call Vira Harding to schedule an appointment: [phone]  Go to ER if new or worsening symptoms occur  Chief Complaint     Chief Complaint   Patient presents with    Ankle Injury     10/6/2018 Injured Lt ankle while falling fwd after tripping a couple steps, pt states there is limitedROM, swelling and bruising  Pt has been taking Tylenol OTC, some relief         History of Present Illness       Patient presents status post ankle injury that occurred 3 days ago  Patient went to family practice this morning who ordered an ankle x-ray on her  The ankle x-ray showed a closed fracture of distal fibula  Patient was sent here for splint application only  Patient's current pain is 4/10  No numbness or tingling distally  Patient has appointment with Orthopedics scheduled  Review of Systems   Review of Systems   Constitutional: Negative  Negative for chills, fatigue and fever  HENT: Negative  Eyes: Negative  Respiratory: Negative  Negative for chest tightness and wheezing  Cardiovascular: Negative  Negative for chest pain and palpitations  Gastrointestinal: Negative for abdominal pain, constipation, diarrhea, nausea and vomiting  Endocrine: Negative  Genitourinary: Negative for dysuria  Musculoskeletal: Positive for gait problem and joint swelling (Left ankle)   Negative for back pain and neck pain  Skin: Negative  Negative for pallor and rash  Allergic/Immunologic: Negative  Neurological: Negative for weakness and numbness  Hematological: Negative  Psychiatric/Behavioral: Negative            Current Medications       Current Outpatient Prescriptions:     acetaminophen (TYLENOL) 500 mg tablet, Take 500 mg by mouth every 6 (six) hours as needed for mild pain, Disp: , Rfl:     atorvastatin (LIPITOR) 40 mg tablet, Take 40 mg by mouth daily, Disp: , Rfl:     cholecalciferol (VITAMIN D3) 1,000 units tablet, Take 2,000 Units by mouth daily, Disp: , Rfl:     clopidogrel (PLAVIX) 75 mg tablet, Take 1 tablet (75 mg total) by mouth daily, Disp: 30 tablet, Rfl: 4    Multiple Vitamin (MULTIVITAMIN) tablet, Take 1 tablet by mouth daily, Disp: , Rfl:     Omega-3 Fatty Acids (FISH OIL PO), Take by mouth, Disp: , Rfl:     oxybutynin (DITROPAN-XL) 5 mg 24 hr tablet, take 1 tablet by mouth once daily, Disp: 30 tablet, Rfl: 5    pantoprazole (PROTONIX) 40 mg tablet, Take 1 tablet (40 mg total) by mouth daily, Disp: 30 tablet, Rfl: 3    amLODIPine (NORVASC) 5 mg tablet, Take 1 tablet (5 mg total) by mouth every evening for 90 days, Disp: 90 tablet, Rfl: 3    metoprolol succinate (TOPROL-XL) 50 mg 24 hr tablet, Take 1 tablet (50 mg total) by mouth daily for 90 days, Disp: 90 tablet, Rfl: 3    Current Allergies     Allergies as of 10/09/2018 - Reviewed 10/09/2018   Allergen Reaction Noted    Penicillins Rash 09/04/2012            The following portions of the patient's history were reviewed and updated as appropriate: allergies, current medications, past family history, past medical history, past social history, past surgical history and problem list      Past Medical History:   Diagnosis Date    Endometriosis     GERD (gastroesophageal reflux disease)     Hyperlipidemia     Hypertension     malignant / last assessed 6/16/17    IFG (impaired fasting glucose)     last assessed 6/16/17       Past Surgical History:   Procedure Laterality Date    BUNIONECTOMY Right 2010    COLONOSCOPY      complete    HYSTERECTOMY      total abdominal with b/l ovary removal    KNEE CARTILAGE SURGERY Right     theraputic       Family History   Problem Relation Age of Onset    Early death Mother     Heart attack Mother         acute MI    Heart attack Sister     Heart disease Sister         CABG    Coronary artery disease Sister     Diabetes Brother     Cancer Family         malignant neoplasm of urinary bladder         Medications have been verified  Objective   /80   Pulse 57   Temp 97 9 °F (36 6 °C) (Temporal)   Resp 15   Ht 4' 10" (1 473 m)   Wt 64 kg (141 lb)   SpO2 99%   BMI 29 47 kg/m²        Physical Exam     Physical Exam   Constitutional: She appears well-developed and well-nourished  No distress  Cardiovascular: Normal rate, regular rhythm, normal heart sounds and intact distal pulses  Pulmonary/Chest: Effort normal and breath sounds normal  No respiratory distress  She has no wheezes  She has no rales  Musculoskeletal:        Feet:    Skin: No rash noted  She is not diaphoretic  No pallor  Nursing note and vitals reviewed          Splint application  Date/Time: 10/9/2018 4:39 PM  Performed by: Tomasz Byrd  Authorized by: Tomasz [place]     Consent:     Consent obtained:  Verbal    Consent given by:  Patient    Risks discussed:  Discoloration, numbness, pain and swelling    Alternatives discussed:  No treatment and referral  Pre-procedure details:     Sensation:  Normal  Procedure details:     Laterality:  Left    Location:  Ankle    Ankle:  L ankleCast type:  Short leg    Splint type:  Short leg    Supplies:  Cotton padding, fiberglass and elastic bandage

## 2018-10-09 NOTE — PATIENT INSTRUCTIONS
Rest, ice, elevate the affected limb  Take over-the-counter pain medication for symptom relief  Follow up with Orthopedics this week  Call Shabbir Bernabe to schedule an appointment: [phone]  Go to ER if new or worsening symptoms occur  Ankle Fracture   WHAT YOU NEED TO KNOW:   An ankle fracture is a break in 1 or more of the bones in your ankle  DISCHARGE INSTRUCTIONS:   Call 911 for any of the following:   · You feel lightheaded, short of breath, and have chest pain  · You cough up blood  Return to the emergency department if:   · Your leg feels warm, tender, and painful  It may look swollen and red  · Blood soaks through your bandage  · You have severe pain in your ankle  · Your cast feels too tight  · Your foot or toes are cold or numb  · Your foot or toenails turn blue or gray  Contact your healthcare provider if:   · Your splint feels too tight  · Your swelling has increased or returned  · You have a fever  · Your pain does not go away, even after treatment  · You have questions or concerns about your condition or care  Medicines: You may need any of the following:  · Acetaminophen  decreases pain and fever  It is available without a doctor's order  Ask how much to take and how often to take it  Follow directions  Acetaminophen can cause liver damage if not taken correctly  · NSAIDs , such as ibuprofen, help decrease swelling, pain, and fever  This medicine is available with or without a doctor's order  NSAIDs can cause stomach bleeding or kidney problems in certain people  If you take blood thinner medicine, always ask your healthcare provider if NSAIDs are safe for you  Always read the medicine label and follow directions  · Prescription pain medicine  may be given  Ask your healthcare provider how to take this medicine safely  · Take your medicine as directed    Contact your healthcare provider if you think your medicine is not helping or if you have side effects  Tell him or her if you are allergic to any medicine  Keep a list of the medicines, vitamins, and herbs you take  Include the amounts, and when and why you take them  Bring the list or the pill bottles to follow-up visits  Carry your medicine list with you in case of an emergency  Follow up with your healthcare provider in 1 to 2 days: Your fracture may need to be reduced (bones pushed back into place) or you may need surgery  Write down your questions so you remember to ask them during your visits  Support devices: You will be given a brace, cast, or splint to limit your movement and protect your ankle  You may need to use crutches to protect your ankle and decrease your pain as you move around  Do not remove your device and do not put weight on your injured ankle  Splint and cast care:  Cover the splint or cast before you bathe so it does not get wet  Tape 2 plastic trash bags to your skin above the cast  Try to keep your ankle out of the water as much as possible  Rest:  Rest your ankle so that it can heal  Return to normal activities as directed  Ice:  Apply ice on your ankle for 15 to 20 minutes every hour or as directed  Use an ice pack, or put crushed ice in a plastic bag  Cover it with a towel  Ice helps prevent tissue damage and decreases swelling and pain  Elevate:  Elevate your ankle above the level of your heart as often as you can  This will help decrease swelling and pain  Prop your ankle on pillows or blankets to keep it elevated comfortably  © 2017 [employer] Information is for End User's use only and may not be sold, redistributed or otherwise used for commercial purposes  All illustrations and images included in CareNotes® are the copyrighted property of [employer]  or Abdias Jamison  The above information is an  only  It is not intended as medical advice for individual conditions or treatments   Talk to your doctor, nurse or pharmacist before following any medical regimen to see if it is safe and effective for you

## 2018-10-09 NOTE — TELEPHONE ENCOUNTER
Spoke with patient stated that the swelling has decreased left ankle, able to move ankle, foot  Patient able to put shoe on today and bear weight and is able ambulate with walker  States that left ankle bone is tender

## 2018-10-15 ENCOUNTER — OFFICE VISIT (OUTPATIENT)
Dept: OBGYN CLINIC | Facility: MEDICAL CENTER | Age: 73
End: 2018-10-15
Payer: COMMERCIAL

## 2018-10-15 VITALS — SYSTOLIC BLOOD PRESSURE: 113 MMHG | HEART RATE: 80 BPM | DIASTOLIC BLOOD PRESSURE: 74 MMHG

## 2018-10-15 DIAGNOSIS — M25.572 PAIN, JOINT, ANKLE AND FOOT, LEFT: ICD-10-CM

## 2018-10-15 DIAGNOSIS — M25.472 LEFT ANKLE EFFUSION: Primary | ICD-10-CM

## 2018-10-15 PROCEDURE — 99203 OFFICE O/P NEW LOW 30 MIN: CPT | Performed by: FAMILY MEDICINE

## 2018-10-15 RX ORDER — AMLODIPINE BESYLATE 5 MG/1
5 TABLET ORAL DAILY
COMMUNITY
End: 2019-08-20 | Stop reason: SDUPTHER

## 2018-10-15 RX ORDER — METOPROLOL SUCCINATE 50 MG/1
50 TABLET, EXTENDED RELEASE ORAL DAILY
COMMUNITY
End: 2019-02-28 | Stop reason: SDUPTHER

## 2018-10-15 NOTE — PROGRESS NOTES
Assessment:     1  Left ankle effusion    2  Pain, joint, ankle and foot, left        Plan:     Problem List Items Addressed This Visit     Pain, joint, ankle and foot, left    Left ankle effusion - Primary         Subjective:     Patient ID: Eduard Mccrary is a 67 y o  female  Chief Complaint:  Patient is a 66-year-old female presenting today for evaluation of left ankle pain  She reports slipping and falling on October 6, 2018  She reported immediate onset of pain along the left ankle an leg  Pain continues today as a throbbing, achy pain with associated swelling and bruising  Pain is reproduced with any movement of her foot and ankle  She has been placed into a splint from the urgent care center and has been nonweightbearing up to this time  She denies any numbness or tingling  She denies any warmth or crepitus        Allergy:  Allergies   Allergen Reactions    Penicillins Rash     Medications:  all current active meds have been reviewed   Past Medical History:  Past Medical History:   Diagnosis Date    Endometriosis     GERD (gastroesophageal reflux disease)     Hyperlipidemia     Hypertension     malignant / last assessed 6/16/17    IFG (impaired fasting glucose)     last assessed 6/16/17     Past Surgical History:  Past Surgical History:   Procedure Laterality Date    BUNIONECTOMY Right 2010    COLONOSCOPY      complete    HYSTERECTOMY      total abdominal with b/l ovary removal    KNEE CARTILAGE SURGERY Right     theraputic     Family History:  Family History   Problem Relation Age of Onset    Early death Mother     Heart attack Mother         acute MI    Heart attack Sister     Heart disease Sister         CABG    Coronary artery disease Sister     Diabetes Brother     Cancer Family         malignant neoplasm of urinary bladder     Social History:  History   Alcohol Use No     History   Drug Use No     History   Smoking Status    Never Smoker   Smokeless Tobacco    Never Used Review of Systems   Constitutional: Negative  HENT: Negative  Eyes: Negative  Respiratory: Negative  Cardiovascular: Negative  Gastrointestinal: Negative  Genitourinary: Negative  Musculoskeletal: Positive for arthralgias and myalgias  Skin: Negative  Allergic/Immunologic: Negative  Neurological: Negative  Hematological: Negative  Psychiatric/Behavioral: Negative  Objective:  BP Readings from Last 1 Encounters:   10/15/18 113/74      Wt Readings from Last 1 Encounters:   10/09/18 64 kg (141 lb)      BMI:   Estimated body mass index is 29 47 kg/m² as calculated from the following:    Height as of 10/9/18: 4' 10" (1 473 m)  Weight as of 10/9/18: 64 kg (141 lb)  BSA:   Estimated body surface area is 1 57 meters squared as calculated from the following:    Height as of 10/9/18: 4' 10" (1 473 m)  Weight as of 10/9/18: 64 kg (141 lb)  Physical Exam   Constitutional: She is oriented to person, place, and time  Vital signs are normal  She appears well-developed  HENT:   Head: Normocephalic  Eyes: Pupils are equal, round, and reactive to light  Pulmonary/Chest: Effort normal    Neurological: She is alert and oriented to person, place, and time  Skin: Skin is warm and dry  Psychiatric: She has a normal mood and affect  Nursing note and vitals reviewed  Left Ankle Exam   Swelling: moderate    Tenderness   The patient is experiencing tenderness in the ATF and lateral malleolus  Range of Motion   Dorsiflexion: abnormal   Plantar flexion: abnormal   Inversion: abnormal   Eversion: abnormal     Muscle Strength   Dorsiflexion:  4/5   Plantar flexion:  4/5   Anterior tibial:  4/5   Posterior tibial:  4/5  Gastrocsoleus:  4/5  Peroneal muscle:  4/5    Other   Erythema: absent  Sensation: normal  Pulse: present            I have personally reviewed pertinent films in PACS     Displaced oblique fracture of the distal fibula

## 2018-10-16 ENCOUNTER — APPOINTMENT (OUTPATIENT)
Dept: LAB | Facility: CLINIC | Age: 73
End: 2018-10-16
Payer: COMMERCIAL

## 2018-10-16 ENCOUNTER — LAB (OUTPATIENT)
Dept: LAB | Facility: CLINIC | Age: 73
End: 2018-10-16
Payer: COMMERCIAL

## 2018-10-16 ENCOUNTER — OFFICE VISIT (OUTPATIENT)
Dept: OBGYN CLINIC | Facility: CLINIC | Age: 73
End: 2018-10-16
Payer: COMMERCIAL

## 2018-10-16 VITALS
HEART RATE: 76 BPM | BODY MASS INDEX: 30.23 KG/M2 | SYSTOLIC BLOOD PRESSURE: 154 MMHG | DIASTOLIC BLOOD PRESSURE: 77 MMHG | HEIGHT: 58 IN | WEIGHT: 144 LBS

## 2018-10-16 DIAGNOSIS — S82.832A DISPLACED FRACTURE OF DISTAL END OF LEFT FIBULA: ICD-10-CM

## 2018-10-16 DIAGNOSIS — S82.832A DISPLACED FRACTURE OF DISTAL END OF LEFT FIBULA: Primary | ICD-10-CM

## 2018-10-16 LAB
ANION GAP SERPL CALCULATED.3IONS-SCNC: 11 MMOL/L (ref 4–13)
APTT PPP: 29 SECONDS (ref 24–36)
BASOPHILS # BLD AUTO: 0.06 THOUSANDS/ΜL (ref 0–0.1)
BASOPHILS NFR BLD AUTO: 1 % (ref 0–1)
BUN SERPL-MCNC: 12 MG/DL (ref 5–25)
CALCIUM SERPL-MCNC: 9.7 MG/DL (ref 8.3–10.1)
CHLORIDE SERPL-SCNC: 102 MMOL/L (ref 100–108)
CO2 SERPL-SCNC: 25 MMOL/L (ref 21–32)
CREAT SERPL-MCNC: 0.79 MG/DL (ref 0.6–1.3)
EOSINOPHIL # BLD AUTO: 0.18 THOUSAND/ΜL (ref 0–0.61)
EOSINOPHIL NFR BLD AUTO: 2 % (ref 0–6)
ERYTHROCYTE [DISTWIDTH] IN BLOOD BY AUTOMATED COUNT: 13.5 % (ref 11.6–15.1)
GFR SERPL CREATININE-BSD FRML MDRD: 75 ML/MIN/1.73SQ M
GLUCOSE SERPL-MCNC: 127 MG/DL (ref 65–140)
HCT VFR BLD AUTO: 44 % (ref 34.8–46.1)
HGB BLD-MCNC: 14.4 G/DL (ref 11.5–15.4)
IMM GRANULOCYTES # BLD AUTO: 0.03 THOUSAND/UL (ref 0–0.2)
IMM GRANULOCYTES NFR BLD AUTO: 0 % (ref 0–2)
INR PPP: 1.04 (ref 0.86–1.17)
LYMPHOCYTES # BLD AUTO: 2.85 THOUSANDS/ΜL (ref 0.6–4.47)
LYMPHOCYTES NFR BLD AUTO: 39 % (ref 14–44)
MCH RBC QN AUTO: 29.6 PG (ref 26.8–34.3)
MCHC RBC AUTO-ENTMCNC: 32.7 G/DL (ref 31.4–37.4)
MCV RBC AUTO: 91 FL (ref 82–98)
MONOCYTES # BLD AUTO: 0.56 THOUSAND/ΜL (ref 0.17–1.22)
MONOCYTES NFR BLD AUTO: 8 % (ref 4–12)
NEUTROPHILS # BLD AUTO: 3.68 THOUSANDS/ΜL (ref 1.85–7.62)
NEUTS SEG NFR BLD AUTO: 50 % (ref 43–75)
NRBC BLD AUTO-RTO: 0 /100 WBCS
PLATELET # BLD AUTO: 234 THOUSANDS/UL (ref 149–390)
PMV BLD AUTO: 12.1 FL (ref 8.9–12.7)
POTASSIUM SERPL-SCNC: 3.8 MMOL/L (ref 3.5–5.3)
PROTHROMBIN TIME: 13.3 SECONDS (ref 11.8–14.2)
RBC # BLD AUTO: 4.86 MILLION/UL (ref 3.81–5.12)
SODIUM SERPL-SCNC: 138 MMOL/L (ref 136–145)
WBC # BLD AUTO: 7.36 THOUSAND/UL (ref 4.31–10.16)

## 2018-10-16 PROCEDURE — 85610 PROTHROMBIN TIME: CPT

## 2018-10-16 PROCEDURE — 85025 COMPLETE CBC W/AUTO DIFF WBC: CPT

## 2018-10-16 PROCEDURE — 80048 BASIC METABOLIC PNL TOTAL CA: CPT

## 2018-10-16 PROCEDURE — 99214 OFFICE O/P EST MOD 30 MIN: CPT | Performed by: ORTHOPAEDIC SURGERY

## 2018-10-16 PROCEDURE — 36415 COLL VENOUS BLD VENIPUNCTURE: CPT

## 2018-10-16 PROCEDURE — 93005 ELECTROCARDIOGRAM TRACING: CPT

## 2018-10-16 PROCEDURE — 85730 THROMBOPLASTIN TIME PARTIAL: CPT

## 2018-10-16 RX ORDER — CLINDAMYCIN PHOSPHATE 900 MG/50ML
900 INJECTION INTRAVENOUS ONCE
Status: CANCELLED | OUTPATIENT
Start: 2018-10-16 | End: 2018-10-16

## 2018-10-16 RX ORDER — CHLORHEXIDINE GLUCONATE 4 G/100ML
SOLUTION TOPICAL DAILY PRN
Status: CANCELLED | OUTPATIENT
Start: 2018-10-16

## 2018-10-16 NOTE — PROGRESS NOTES
Assessment:  1  Pain, joint, ankle and foot, left     2  Displaced fracture of distal end of left fibula         Plan:  Mildly displaced oblique fracture of the LEFT distal fibula  Initial injury occurred on 10/6/2018  Explained to the patient that she will need an ORIF surgical procedure to get fracture lined up to heal properly to avoid arthritis in the future  She understood and wished to proceed with the procedure at this time  The risks and benefits of the procedure were explained in detail to the patient  She understood and all questions were answered  To do next visit:  Return for Post Operative visit of LEFT distal fibula  Scribe Attestation    I,:   Kameron Salmon am acting as a scribe while in the presence of the attending physician :        I,:   Tommi Boas, MD personally performed the services described in this documentation    as scribed in my presence :              Subjective:   David Oshea is a 67 y o  female who presents today for an evaluation of her LEFT ankle  Patient notes that on 10/6/2018 she slipped and fell outside of her home  She reports an immediate onset of pain and swelling in the LEFT leg and ankle  Patient describes the pain today as a mild throbbing, achy pain  Patient states that she has pain with any ROM of her LEFT ankle and foot  She was initially seen at  Urgent Care and given a posterior splint and was instructed to be non weight bearing  She then saw Dr Bonnie Laguerre who took an x ray and referred her here for a possible surgery  She was instructed again to stay non weight bearing  Denies numbness and tingling  No warmth or crepitus         Review of systems negative unless otherwise specified in HPI    Past Medical History:   Diagnosis Date    Endometriosis     GERD (gastroesophageal reflux disease)     Hyperlipidemia     Hypertension     malignant / last assessed 6/16/17    IFG (impaired fasting glucose)     last assessed 6/16/17       Past Surgical History:   Procedure Laterality Date    BUNIONECTOMY Right 2010    COLONOSCOPY      complete    HYSTERECTOMY      total abdominal with b/l ovary removal    KNEE CARTILAGE SURGERY Right     theraputic       Family History   Problem Relation Age of Onset    Early death Mother     Heart attack Mother         acute MI    Heart attack Sister     Heart disease Sister         CABG    Coronary artery disease Sister     Diabetes Brother     Cancer Family         malignant neoplasm of urinary bladder       Social History     Occupational History    retired      Social History Main Topics    Smoking status: Never Smoker    Smokeless tobacco: Never Used    Alcohol use No    Drug use: No    Sexual activity: Not on file         Current Outpatient Prescriptions:     acetaminophen (TYLENOL) 500 mg tablet, Take 500 mg by mouth every 6 (six) hours as needed for mild pain, Disp: , Rfl:     amLODIPine (NORVASC) 5 mg tablet, Take 5 mg by mouth daily, Disp: , Rfl:     atorvastatin (LIPITOR) 40 mg tablet, Take 40 mg by mouth daily, Disp: , Rfl:     cholecalciferol (VITAMIN D3) 1,000 units tablet, Take 2,000 Units by mouth daily, Disp: , Rfl:     clopidogrel (PLAVIX) 75 mg tablet, Take 1 tablet (75 mg total) by mouth daily, Disp: 30 tablet, Rfl: 4    metoprolol succinate (TOPROL-XL) 50 mg 24 hr tablet, Take 25 mg by mouth daily, Disp: , Rfl:     Multiple Vitamin (MULTIVITAMIN) tablet, Take 1 tablet by mouth daily, Disp: , Rfl:     Omega-3 Fatty Acids (FISH OIL PO), Take by mouth, Disp: , Rfl:     oxybutynin (DITROPAN-XL) 5 mg 24 hr tablet, take 1 tablet by mouth once daily, Disp: 30 tablet, Rfl: 5    pantoprazole (PROTONIX) 40 mg tablet, Take 1 tablet (40 mg total) by mouth daily, Disp: 30 tablet, Rfl: 3    Allergies   Allergen Reactions    Penicillins Rash            Vitals:    10/16/18 1031   BP: 154/77   Pulse: 76       Objective:          Physical Exam                    Left Ankle Exam   Swelling: moderate    Tenderness   The patient is experiencing tenderness in the lateral malleolus  Range of Motion   Dorsiflexion: abnormal   Plantar flexion: abnormal   Inversion: abnormal   Eversion: abnormal     Muscle Strength   Dorsiflexion:  4/5   Plantar flexion:  4/5     Other   Erythema: absent  Sensation: normal  Pulse: present    Comments:  Patient is neurovascular intact distally            Diagnostics, reviewed and taken today if performed as documented: The attending physician has personally reviewed the pertinent films of the LEFT ankle from 10/9/2018 in PACS and interpretation is as follows:    Mildly displaced oblique fracture of the distal fibula    Procedures, if performed today:  Procedures    None performed      Portions of the record may have been created with voice recognition software   Occasional wrong word or "sound a like" substitutions may have occurred due to the inherent limitations of voice recognition software   Read the chart carefully and recognize, using context, where substitutions have occurred

## 2018-10-17 ENCOUNTER — ANESTHESIA EVENT (OUTPATIENT)
Dept: PERIOP | Facility: AMBULARY SURGERY CENTER | Age: 73
End: 2018-10-17
Payer: COMMERCIAL

## 2018-10-17 ENCOUNTER — TELEPHONE (OUTPATIENT)
Dept: OBGYN CLINIC | Facility: HOSPITAL | Age: 73
End: 2018-10-17

## 2018-10-17 ENCOUNTER — TELEPHONE (OUTPATIENT)
Dept: NEUROLOGY | Facility: CLINIC | Age: 73
End: 2018-10-17

## 2018-10-17 LAB
ATRIAL RATE: 61 BPM
P AXIS: 61 DEGREES
PR INTERVAL: 184 MS
QRS AXIS: -12 DEGREES
QRSD INTERVAL: 88 MS
QT INTERVAL: 408 MS
QTC INTERVAL: 410 MS
T WAVE AXIS: 21 DEGREES
VENTRICULAR RATE: 61 BPM

## 2018-10-17 PROCEDURE — 93010 ELECTROCARDIOGRAM REPORT: CPT | Performed by: INTERNAL MEDICINE

## 2018-10-17 NOTE — TELEPHONE ENCOUNTER
Pt broke her ankle, have a plate put in tomorrow  Should pt hold her plavix today & tomorrow?   Ortho is ok w/pt being on plavix, just want pt to check w/neuro    Please advise

## 2018-10-17 NOTE — TELEPHONE ENCOUNTER
Caller: Patient   Callback#: [phone] [id number]       Patient called in wanting to know if it's ok for her to continue taking clopidogrel (PLAVIX) 75 mg before her surgery tomorrow 10/17  She states neuro is fine with it   Please advise, thanks

## 2018-10-17 NOTE — PRE-PROCEDURE INSTRUCTIONS
Pre-Surgery Instructions:   Medication Instructions    acetaminophen (TYLENOL) 500 mg tablet Instructed patient per Anesthesia Guidelines   amLODIPine (NORVASC) 5 mg tablet Instructed patient per Anesthesia Guidelines   atorvastatin (LIPITOR) 40 mg tablet Instructed patient per Anesthesia Guidelines   cholecalciferol (VITAMIN D3) 1,000 units tablet Patient was instructed by Physician and understands   clopidogrel (PLAVIX) 75 mg tablet Patient was instructed by Physician and understands   metoprolol succinate (TOPROL-XL) 50 mg 24 hr tablet Instructed patient per Anesthesia Guidelines   Multiple Vitamin (MULTIVITAMIN) tablet Patient was instructed by Physician and understands   Omega-3 Fatty Acids (FISH OIL PO) Patient was instructed by Physician and understands   oxybutynin (DITROPAN-XL) 5 mg 24 hr tablet Instructed patient per Anesthesia Guidelines   pantoprazole (PROTONIX) 40 mg tablet Instructed patient per Anesthesia Guidelines  Pre op and bathing instructions reviewed  Pt instructed to use antibacterial   Soap

## 2018-10-18 ENCOUNTER — HOSPITAL ENCOUNTER (OUTPATIENT)
Facility: AMBULARY SURGERY CENTER | Age: 73
Setting detail: OUTPATIENT SURGERY
Discharge: HOME | End: 2018-10-18
Attending: ORTHOPAEDIC SURGERY | Admitting: ORTHOPAEDIC SURGERY
Payer: COMMERCIAL

## 2018-10-18 ENCOUNTER — APPOINTMENT (OUTPATIENT)
Dept: RADIOLOGY | Facility: AMBULARY SURGERY CENTER | Age: 73
End: 2018-10-18
Payer: COMMERCIAL

## 2018-10-18 ENCOUNTER — ANESTHESIA (OUTPATIENT)
Dept: PERIOP | Facility: AMBULARY SURGERY CENTER | Age: 73
End: 2018-10-18
Payer: COMMERCIAL

## 2018-10-18 VITALS
TEMPERATURE: 97.1 F | BODY MASS INDEX: 29.6 KG/M2 | HEART RATE: 73 BPM | HEIGHT: 58 IN | SYSTOLIC BLOOD PRESSURE: 106 MMHG | OXYGEN SATURATION: 94 % | RESPIRATION RATE: 18 BRPM | DIASTOLIC BLOOD PRESSURE: 54 MMHG | WEIGHT: 141 LBS

## 2018-10-18 DIAGNOSIS — S82.832A CLOSED FRACTURE OF DISTAL END OF LEFT FIBULA, UNSPECIFIED FRACTURE MORPHOLOGY, INITIAL ENCOUNTER: Primary | ICD-10-CM

## 2018-10-18 PROCEDURE — C1713 ANCHOR/SCREW BN/BN,TIS/BN: HCPCS | Performed by: ORTHOPAEDIC SURGERY

## 2018-10-18 PROCEDURE — 27792 TREATMENT OF ANKLE FRACTURE: CPT | Performed by: ORTHOPAEDIC SURGERY

## 2018-10-18 PROCEDURE — 73610 X-RAY EXAM OF ANKLE: CPT

## 2018-10-18 DEVICE — 3.5MM CORTEX SCREW SELF-TAPPING 14MM: Type: IMPLANTABLE DEVICE | Status: FUNCTIONAL

## 2018-10-18 DEVICE — 3.5MM CORTEX SCREW SELF-TAPPING 12MM: Type: IMPLANTABLE DEVICE | Status: FUNCTIONAL

## 2018-10-18 DEVICE — 4.0MM CANCELLOUS BONE SCREW PARTIALLY THREADED/16MM: Type: IMPLANTABLE DEVICE | Status: FUNCTIONAL

## 2018-10-18 DEVICE — 3.5MM CORTEX SCREW SELF-TAPPING 16MM: Type: IMPLANTABLE DEVICE | Status: FUNCTIONAL

## 2018-10-18 DEVICE — IMPLANTABLE DEVICE: Type: IMPLANTABLE DEVICE | Status: FUNCTIONAL

## 2018-10-18 RX ORDER — ONDANSETRON 2 MG/ML
INJECTION INTRAMUSCULAR; INTRAVENOUS AS NEEDED
Status: DISCONTINUED | OUTPATIENT
Start: 2018-10-18 | End: 2018-10-18 | Stop reason: SURG

## 2018-10-18 RX ORDER — HYDROMORPHONE HCL/PF 1 MG/ML
0.4 SYRINGE (ML) INJECTION
Status: DISCONTINUED | OUTPATIENT
Start: 2018-10-18 | End: 2018-10-18

## 2018-10-18 RX ORDER — PROPOFOL 10 MG/ML
INJECTION, EMULSION INTRAVENOUS AS NEEDED
Status: DISCONTINUED | OUTPATIENT
Start: 2018-10-18 | End: 2018-10-18 | Stop reason: SURG

## 2018-10-18 RX ORDER — BUPIVACAINE HYDROCHLORIDE 5 MG/ML
INJECTION, SOLUTION PERINEURAL AS NEEDED
Status: DISCONTINUED | OUTPATIENT
Start: 2018-10-18 | End: 2018-10-18 | Stop reason: SURG

## 2018-10-18 RX ORDER — ROPIVACAINE HYDROCHLORIDE 5 MG/ML
INJECTION, SOLUTION EPIDURAL; INFILTRATION; PERINEURAL AS NEEDED
Status: DISCONTINUED | OUTPATIENT
Start: 2018-10-18 | End: 2018-10-18 | Stop reason: SURG

## 2018-10-18 RX ORDER — CHLORHEXIDINE GLUCONATE 4 G/100ML
SOLUTION TOPICAL DAILY PRN
Status: DISCONTINUED | OUTPATIENT
Start: 2018-10-18 | End: 2018-10-18 | Stop reason: HOSPADM

## 2018-10-18 RX ORDER — CLINDAMYCIN PHOSPHATE 900 MG/50ML
900 INJECTION INTRAVENOUS ONCE
Status: COMPLETED | OUTPATIENT
Start: 2018-10-18 | End: 2018-10-18

## 2018-10-18 RX ORDER — FENTANYL CITRATE 50 UG/ML
INJECTION, SOLUTION INTRAMUSCULAR; INTRAVENOUS AS NEEDED
Status: DISCONTINUED | OUTPATIENT
Start: 2018-10-18 | End: 2018-10-18 | Stop reason: SURG

## 2018-10-18 RX ORDER — HYDROCODONE BITARTRATE AND ACETAMINOPHEN 5; 325 MG/1; MG/1
2 TABLET ORAL EVERY 4 HOURS PRN
Status: DISCONTINUED | OUTPATIENT
Start: 2018-10-18 | End: 2018-10-18 | Stop reason: HOSPADM

## 2018-10-18 RX ORDER — FENTANYL CITRATE/PF 50 MCG/ML
25 SYRINGE (ML) INJECTION
Status: DISCONTINUED | OUTPATIENT
Start: 2018-10-18 | End: 2018-10-18

## 2018-10-18 RX ORDER — ONDANSETRON 2 MG/ML
4 INJECTION INTRAMUSCULAR; INTRAVENOUS ONCE AS NEEDED
Status: DISCONTINUED | OUTPATIENT
Start: 2018-10-18 | End: 2018-10-18 | Stop reason: HOSPADM

## 2018-10-18 RX ORDER — ACETAMINOPHEN 325 MG/1
975 TABLET ORAL ONCE
Status: COMPLETED | OUTPATIENT
Start: 2018-10-18 | End: 2018-10-18

## 2018-10-18 RX ORDER — PROMETHAZINE HYDROCHLORIDE 25 MG/ML
12.5 INJECTION, SOLUTION INTRAMUSCULAR; INTRAVENOUS ONCE AS NEEDED
Status: DISCONTINUED | OUTPATIENT
Start: 2018-10-18 | End: 2018-10-18 | Stop reason: HOSPADM

## 2018-10-18 RX ORDER — MAGNESIUM HYDROXIDE 1200 MG/15ML
LIQUID ORAL AS NEEDED
Status: DISCONTINUED | OUTPATIENT
Start: 2018-10-18 | End: 2018-10-18 | Stop reason: HOSPADM

## 2018-10-18 RX ORDER — ACETAMINOPHEN 325 MG/1
650 TABLET ORAL EVERY 4 HOURS PRN
Status: DISCONTINUED | OUTPATIENT
Start: 2018-10-18 | End: 2018-10-18 | Stop reason: HOSPADM

## 2018-10-18 RX ORDER — ONDANSETRON 2 MG/ML
4 INJECTION INTRAMUSCULAR; INTRAVENOUS EVERY 6 HOURS PRN
Status: DISCONTINUED | OUTPATIENT
Start: 2018-10-18 | End: 2018-10-18 | Stop reason: HOSPADM

## 2018-10-18 RX ORDER — SODIUM CHLORIDE, SODIUM LACTATE, POTASSIUM CHLORIDE, CALCIUM CHLORIDE 600; 310; 30; 20 MG/100ML; MG/100ML; MG/100ML; MG/100ML
100 INJECTION, SOLUTION INTRAVENOUS CONTINUOUS
Status: DISCONTINUED | OUTPATIENT
Start: 2018-10-18 | End: 2018-10-18 | Stop reason: HOSPADM

## 2018-10-18 RX ORDER — SODIUM CHLORIDE, SODIUM LACTATE, POTASSIUM CHLORIDE, CALCIUM CHLORIDE 600; 310; 30; 20 MG/100ML; MG/100ML; MG/100ML; MG/100ML
125 INJECTION, SOLUTION INTRAVENOUS CONTINUOUS
Status: DISCONTINUED | OUTPATIENT
Start: 2018-10-18 | End: 2018-10-18 | Stop reason: HOSPADM

## 2018-10-18 RX ORDER — GABAPENTIN 300 MG/1
300 CAPSULE ORAL ONCE
Status: COMPLETED | OUTPATIENT
Start: 2018-10-18 | End: 2018-10-18

## 2018-10-18 RX ORDER — EPHEDRINE SULFATE 50 MG/ML
INJECTION, SOLUTION INTRAVENOUS AS NEEDED
Status: DISCONTINUED | OUTPATIENT
Start: 2018-10-18 | End: 2018-10-18 | Stop reason: SURG

## 2018-10-18 RX ORDER — HYDROCODONE BITARTRATE AND ACETAMINOPHEN 5; 325 MG/1; MG/1
TABLET ORAL
Qty: 20 TABLET | Refills: 0 | Status: SHIPPED | OUTPATIENT
Start: 2018-10-18 | End: 2018-11-01 | Stop reason: ALTCHOICE

## 2018-10-18 RX ORDER — METOCLOPRAMIDE HYDROCHLORIDE 5 MG/ML
10 INJECTION INTRAMUSCULAR; INTRAVENOUS ONCE AS NEEDED
Status: DISCONTINUED | OUTPATIENT
Start: 2018-10-18 | End: 2018-10-18 | Stop reason: HOSPADM

## 2018-10-18 RX ORDER — MIDAZOLAM HYDROCHLORIDE 1 MG/ML
INJECTION INTRAMUSCULAR; INTRAVENOUS AS NEEDED
Status: DISCONTINUED | OUTPATIENT
Start: 2018-10-18 | End: 2018-10-18 | Stop reason: SURG

## 2018-10-18 RX ORDER — LIDOCAINE HYDROCHLORIDE 10 MG/ML
INJECTION, SOLUTION INFILTRATION; PERINEURAL AS NEEDED
Status: DISCONTINUED | OUTPATIENT
Start: 2018-10-18 | End: 2018-10-18 | Stop reason: SURG

## 2018-10-18 RX ADMIN — EPHEDRINE SULFATE 10 MG: 50 INJECTION, SOLUTION INTRAMUSCULAR; INTRAVENOUS; SUBCUTANEOUS at 12:22

## 2018-10-18 RX ADMIN — DEXAMETHASONE SODIUM PHOSPHATE 5 MG: 10 INJECTION INTRAMUSCULAR; INTRAVENOUS at 12:19

## 2018-10-18 RX ADMIN — LIDOCAINE HYDROCHLORIDE 50 MG: 10 INJECTION, SOLUTION INFILTRATION; PERINEURAL at 12:10

## 2018-10-18 RX ADMIN — FENTANYL CITRATE 50 MCG: 50 INJECTION, SOLUTION INTRAMUSCULAR; INTRAVENOUS at 13:10

## 2018-10-18 RX ADMIN — EPHEDRINE SULFATE 10 MG: 50 INJECTION, SOLUTION INTRAMUSCULAR; INTRAVENOUS; SUBCUTANEOUS at 12:35

## 2018-10-18 RX ADMIN — ACETAMINOPHEN 975 MG: 325 TABLET ORAL at 11:23

## 2018-10-18 RX ADMIN — ONDANSETRON 4 MG: 2 INJECTION INTRAMUSCULAR; INTRAVENOUS at 12:57

## 2018-10-18 RX ADMIN — CLINDAMYCIN PHOSPHATE 900 MG: 18 INJECTION, SOLUTION INTRAMUSCULAR; INTRAVENOUS at 12:07

## 2018-10-18 RX ADMIN — FENTANYL CITRATE 50 MCG: 50 INJECTION, SOLUTION INTRAMUSCULAR; INTRAVENOUS at 11:50

## 2018-10-18 RX ADMIN — ROPIVACAINE HYDROCHLORIDE 30 ML: 5 INJECTION, SOLUTION EPIDURAL; INFILTRATION; PERINEURAL at 11:57

## 2018-10-18 RX ADMIN — EPHEDRINE SULFATE 10 MG: 50 INJECTION, SOLUTION INTRAMUSCULAR; INTRAVENOUS; SUBCUTANEOUS at 12:13

## 2018-10-18 RX ADMIN — GABAPENTIN 300 MG: 300 CAPSULE ORAL at 11:19

## 2018-10-18 RX ADMIN — PROPOFOL 130 MG: 10 INJECTION, EMULSION INTRAVENOUS at 12:10

## 2018-10-18 RX ADMIN — SODIUM CHLORIDE, SODIUM LACTATE, POTASSIUM CHLORIDE, AND CALCIUM CHLORIDE: .6; .31; .03; .02 INJECTION, SOLUTION INTRAVENOUS at 11:30

## 2018-10-18 RX ADMIN — BUPIVACAINE HYDROCHLORIDE 10 ML: 5 INJECTION, SOLUTION PERINEURAL at 11:57

## 2018-10-18 RX ADMIN — MIDAZOLAM HYDROCHLORIDE 2 MG: 1 INJECTION, SOLUTION INTRAMUSCULAR; INTRAVENOUS at 11:50

## 2018-10-18 NOTE — ANESTHESIA POSTPROCEDURE EVALUATION
Post-Op Assessment Note      CV Status:  Stable    Mental Status:  Awake    Hydration Status:  Euvolemic    PONV Controlled:  Controlled    Airway Patency:  Patent    Post Op Vitals Reviewed: Yes          Staff: CRNA           /56 (10/18/18 1322)    Temp (!) 97 1 °F (36 2 °C) (10/18/18 1322)    Pulse 75 (10/18/18 1322)   Resp 16 (10/18/18 1322)    SpO2 98 % (10/18/18 1322)

## 2018-10-18 NOTE — ANESTHESIA PROCEDURE NOTES
Peripheral Block    Patient location during procedure: pre-op  Start time: 10/18/2018 11:58 AM  Reason for block: procedure for pain, at surgeon's request and post-op pain management  Staffing  Anesthesiologist: Jolanta Jc  Performed: anesthesiologist   Preanesthetic Checklist  Completed: patient identified, site marked, surgical consent, pre-op evaluation, timeout performed, IV checked, risks and benefits discussed and monitors and equipment checked  Peripheral Block  Patient position: supine  Prep: ChloraPrep  Patient monitoring: continuous pulse ox and frequent blood pressure checks  Block type: adductor canal block  Laterality: left  Injection technique: single-shot  Procedures: ultrasound guided  Ultrasound permanent image saved  Local infiltration: bupivacaine (Bupivacaine 0 5% with 1:200k epinephrine)  Infiltration strength: 0 5 %  Dose: 10 mL  Needle  Needle type: Stimuplex   Needle gauge: 21 G  Needle length: 10 cm  Needle localization: ultrasound guidance  Test dose: negative  Assessment  Injection assessment: incremental injection, local visualized surrounding nerve on ultrasound, no paresthesia on injection and negative aspiration for heme  Paresthesia pain: none  Heart rate change: no  Slow fractionated injection: yes  Post-procedure:  site cleaned  patient tolerated the procedure well with no immediate complications

## 2018-10-18 NOTE — OP NOTE
OPERATIVE REPORT  PATIENT NAME: Hayden Wells    :  1945  MRN: [id number]  Pt Location: AN  OR ROOM 06    SURGERY DATE: 10/18/2018    Surgeon(s) and Role:     * Mary Haynes MD - Primary     * Deuce Gonzalez PA-C - Assisting    Pre-Op Diagnosis Codes:     * Closed fracture of lateral malleolus of left ankle [S82 62XA]    Post-Op Diagnosis Codes:     * Closed fracture of lateral malleolus of left ankle [S82 62XA]    Procedure(s) (LRB):  OPEN REDUCTION INTERNAL FIXATION LEFT ANKLE LATERAL MALLEOLUS    Specimen(s):  * No specimens in log *    Estimated Blood Loss:   Minimal    Anesthesia Type:   General w/ Regional    Complications:   None    Procedure and Technique:  The patient was identified in the pre-operative holding area, and the surgical site was marked by the surgeon  Regional anesthesia was administered by the anesthesiologist in the holding area  The patient was transported to the operating room and placed in the supine position on the OR table  General anesthesia was administered  The left ankle was prepped and draped in the usual sterile fashion  Prophylactic antibiotics were given within 1 hour of incision  Following a surgical timeout, the operative extremity was exsanguinated, and the tourniquet was inflated to 325 mmHg  An 8 cm incision was made over the lateral ankle  Deep dissection was carried out to the fracture site  Fracture hematoma was removed using a curette  The lateral malleolus was reduced, and the reduction was maintained with a pointed reduction forceps  Fluoroscopic imaging was used to confirm satisfactory fracture reduction  A 2 7 mm cortical screw was placed across the fracture site using a lag screw technique  The Synthes one-third tubular plate was applied, and plate position was adjusted under fluoroscopic imaging  Four screws were placed proximal to the fracture  Three were placed within the distal fragment    Fluoroscopic imaging was obtained to confirm satisfactory plate position and fracture reduction  A Cotton test was performed to assess syndesmotic stability, with no evidence of syndesmotic widening  The ankle was irrigated extensively with sterile saline solution  The subcuticular layer was with simple buried 3-0 Vicryl suture  The skin was closed with 3-0 nylon suture in a horizontal mattress fashion  The wound was dressed with sterile 4 x 4 gauze and Webril  A well-padded short leg AO splint was applied and secured with an ACE bandage  Anesthesia was reversed, and the patient was extubated  The assistance of an advanced practitioner was necessary for sterile draping, retraction of soft tissue, and positioning of the operative extremity during the procedure  A qualified resident physician was not available      Patient Disposition:  PACU  and extubated and stable    SIGNATURE: Rancho Thompson MD  DATE: October 18, 2018  TIME: 1:26 PM

## 2018-10-18 NOTE — ANESTHESIA PREPROCEDURE EVALUATION
Review of Systems/Medical History  Patient summary reviewed  Chart reviewed  No history of anesthetic complications     Cardiovascular  EKG reviewed, Exercise tolerance (METS): <4,  Hyperlipidemia, Hypertension on > 1 medication,    Pulmonary  Negative pulmonary ROS        GI/Hepatic    GERD ,        Negative  ROS        Endo/Other  Diabetes well controlled type 2 Diet controlled,      GYN  Negative gynecology ROS          Hematology    Coagulation disorder (Last dose of plavix 2 days ago) currently taking oral anticoagulants,    Musculoskeletal    Arthritis     Neurology    CVA , no residual symptoms,    Psychology   Negative psychology ROS              Physical Exam    Airway    Mallampati score: I  TM Distance: >3 FB  Neck ROM: full     Dental   upper dentures and lower dentures,     Cardiovascular      Pulmonary      Other Findings        Anesthesia Plan  ASA Score- 3     Anesthesia Type- general and regional with ASA Monitors  Additional Monitors:   Airway Plan: LMA  Plan Factors- Patient instructed to abstain from smoking on day of procedure  Patient did not smoke on day of surgery  Induction- intravenous  Postoperative Plan- Plan for postoperative opioid use  Informed Consent- Anesthetic plan and risks discussed with patient  I personally reviewed this patient with the CRNA  Discussed and agreed on the Anesthesia Plan with the CRNA  Amira Mobley

## 2018-10-18 NOTE — H&P (VIEW-ONLY)
Assessment:  1  Pain, joint, ankle and foot, left     2  Displaced fracture of distal end of left fibula         Plan:  Mildly displaced oblique fracture of the LEFT distal fibula  Initial injury occurred on 10/6/2018  Explained to the patient that she will need an ORIF surgical procedure to get fracture lined up to heal properly to avoid arthritis in the future  She understood and wished to proceed with the procedure at this time  The risks and benefits of the procedure were explained in detail to the patient  She understood and all questions were answered  To do next visit:  Return for Post Operative visit of LEFT distal fibula  Scribe Attestation    I,:   Bobby Stevenson am acting as a scribe while in the presence of the attending physician :        I,:   Dottie Burr MD personally performed the services described in this documentation    as scribed in my presence :              Subjective:   Isrela Rivas is a 67 y o  female who presents today for an evaluation of her LEFT ankle  Patient notes that on 10/6/2018 she slipped and fell outside of her home  She reports an immediate onset of pain and swelling in the LEFT leg and ankle  Patient describes the pain today as a mild throbbing, achy pain  Patient states that she has pain with any ROM of her LEFT ankle and foot  She was initially seen at  Urgent Care and given a posterior splint and was instructed to be non weight bearing  She then saw Dr Jesica Mcmillan who took an x ray and referred her here for a possible surgery  She was instructed again to stay non weight bearing  Denies numbness and tingling  No warmth or crepitus         Review of systems negative unless otherwise specified in HPI    Past Medical History:   Diagnosis Date    Endometriosis     GERD (gastroesophageal reflux disease)     Hyperlipidemia     Hypertension     malignant / last assessed 6/16/17    IFG (impaired fasting glucose)     last assessed 6/16/17       Past Surgical History:   Procedure Laterality Date    BUNIONECTOMY Right 2010    COLONOSCOPY      complete    HYSTERECTOMY      total abdominal with b/l ovary removal    KNEE CARTILAGE SURGERY Right     theraputic       Family History   Problem Relation Age of Onset    Early death Mother     Heart attack Mother         acute MI    Heart attack Sister     Heart disease Sister         CABG    Coronary artery disease Sister     Diabetes Brother     Cancer Family         malignant neoplasm of urinary bladder       Social History     Occupational History    retired      Social History Main Topics    Smoking status: Never Smoker    Smokeless tobacco: Never Used    Alcohol use No    Drug use: No    Sexual activity: Not on file         Current Outpatient Prescriptions:     acetaminophen (TYLENOL) 500 mg tablet, Take 500 mg by mouth every 6 (six) hours as needed for mild pain, Disp: , Rfl:     amLODIPine (NORVASC) 5 mg tablet, Take 5 mg by mouth daily, Disp: , Rfl:     atorvastatin (LIPITOR) 40 mg tablet, Take 40 mg by mouth daily, Disp: , Rfl:     cholecalciferol (VITAMIN D3) 1,000 units tablet, Take 2,000 Units by mouth daily, Disp: , Rfl:     clopidogrel (PLAVIX) 75 mg tablet, Take 1 tablet (75 mg total) by mouth daily, Disp: 30 tablet, Rfl: 4    metoprolol succinate (TOPROL-XL) 50 mg 24 hr tablet, Take 25 mg by mouth daily, Disp: , Rfl:     Multiple Vitamin (MULTIVITAMIN) tablet, Take 1 tablet by mouth daily, Disp: , Rfl:     Omega-3 Fatty Acids (FISH OIL PO), Take by mouth, Disp: , Rfl:     oxybutynin (DITROPAN-XL) 5 mg 24 hr tablet, take 1 tablet by mouth once daily, Disp: 30 tablet, Rfl: 5    pantoprazole (PROTONIX) 40 mg tablet, Take 1 tablet (40 mg total) by mouth daily, Disp: 30 tablet, Rfl: 3    Allergies   Allergen Reactions    Penicillins Rash            Vitals:    10/16/18 1031   BP: 154/77   Pulse: 76       Objective:          Physical Exam                    Left Ankle Exam   Swelling: moderate    Tenderness   The patient is experiencing tenderness in the lateral malleolus  Range of Motion   Dorsiflexion: abnormal   Plantar flexion: abnormal   Inversion: abnormal   Eversion: abnormal     Muscle Strength   Dorsiflexion:  4/5   Plantar flexion:  4/5     Other   Erythema: absent  Sensation: normal  Pulse: present    Comments:  Patient is neurovascular intact distally            Diagnostics, reviewed and taken today if performed as documented: The attending physician has personally reviewed the pertinent films of the LEFT ankle from 10/9/2018 in PACS and interpretation is as follows:    Mildly displaced oblique fracture of the distal fibula    Procedures, if performed today:  Procedures    None performed      Portions of the record may have been created with voice recognition software   Occasional wrong word or "sound a like" substitutions may have occurred due to the inherent limitations of voice recognition software   Read the chart carefully and recognize, using context, where substitutions have occurred

## 2018-10-18 NOTE — ANESTHESIA PROCEDURE NOTES
Peripheral Block    Patient location during procedure: pre-op  Start time: 10/18/2018 11:57 AM  Reason for block: at surgeon's request and post-op pain management  Staffing  Anesthesiologist: Betty Paz  Performed: anesthesiologist   Preanesthetic Checklist  Completed: patient identified, site marked, surgical consent, pre-op evaluation, timeout performed, IV checked, risks and benefits discussed and monitors and equipment checked  Peripheral Block  Patient position: supine  Prep: ChloraPrep  Patient monitoring: continuous pulse ox and frequent blood pressure checks  Block type: popliteal  Laterality: left  Injection technique: single-shot  Procedures: ultrasound guided  Local infiltration: ropivacaine (Ropivacaine 0 5% with epi 1:200k)  Infiltration strength: 0 5 %  Dose: 30 mL  Needle  Needle type: StimupElastera   Needle gauge: 21 G  Needle length: 5 cm  Needle localization: ultrasound guidance  Test dose: negative  Assessment  Injection assessment: incremental injection, local visualized surrounding nerve on ultrasound, negative aspiration for heme and no paresthesia on injection  Paresthesia pain: none  Heart rate change: no  Slow fractionated injection: yes  Post-procedure:  site cleaned  patient tolerated the procedure well with no immediate complications

## 2018-10-30 ENCOUNTER — OFFICE VISIT (OUTPATIENT)
Dept: OBGYN CLINIC | Facility: CLINIC | Age: 73
End: 2018-10-30

## 2018-10-30 ENCOUNTER — APPOINTMENT (OUTPATIENT)
Dept: RADIOLOGY | Facility: CLINIC | Age: 73
End: 2018-10-30
Payer: COMMERCIAL

## 2018-10-30 DIAGNOSIS — S82.832A CLOSED FRACTURE OF DISTAL END OF LEFT FIBULA, UNSPECIFIED FRACTURE MORPHOLOGY, INITIAL ENCOUNTER: ICD-10-CM

## 2018-10-30 DIAGNOSIS — S82.832A CLOSED FRACTURE OF DISTAL END OF LEFT FIBULA, UNSPECIFIED FRACTURE MORPHOLOGY, INITIAL ENCOUNTER: Primary | ICD-10-CM

## 2018-10-30 PROCEDURE — 99024 POSTOP FOLLOW-UP VISIT: CPT | Performed by: ORTHOPAEDIC SURGERY

## 2018-10-30 PROCEDURE — 73610 X-RAY EXAM OF ANKLE: CPT

## 2018-10-30 NOTE — PROGRESS NOTES
Patient Name:  Marian Tapia  MRN:  [id number]    Assessment     1  Closed fracture of distal end of left fibula, unspecified fracture morphology, initial encounter  XR ankle 3+ vw left       Plan     ORIF left ankle lateral malleolus 10/18/18  1  CAM walking boot provided in the office today  2  Continue nonweightbearing left lower extremity  3  Follow-up in four weeks for repeat evaluation with repeat x-rays of the left ankle at that time  Consider advancing weight-bearing status at that time as appropriate  Subjective     72-year-old female returns to the office today status post ORIF left ankle lateral malleolus 10/18/18  Today she denies any significant pain  She notes occasional burning in the region of the incision  She has been compliant with the nonweightbearing status in the postoperative splint  No numbness or tingling  No fevers or chills  Objective     There were no vitals taken for this visit  Left ankle:   No gross deformity  Incision well healed without erythema or drainage  Mild soft tissue swelling noted about the foot and ankle  No significant tenderness to palpation along the distal fibula  Ankle range of motion is intact  Toes warm and mobile  Sensation intact left lower extremity  Palpable DP pulse  Data Review     I have personally reviewed pertinent films in PACS, and my interpretation follows  X-rays performed today of the left ankle reveals stable intact orthopedic hardware in satisfactory alignment  The ankle mortise remains intact        Scribe Attestation    I,:   Bessy Steward PA-C am acting as a scribe while in the presence of the attending physician :        I,:   Ning Martinez MD personally performed the services described in this documentation    as scribed in my presence :

## 2018-11-01 ENCOUNTER — OFFICE VISIT (OUTPATIENT)
Dept: NEUROLOGY | Facility: CLINIC | Age: 73
End: 2018-11-01

## 2018-11-01 VITALS
HEIGHT: 58 IN | WEIGHT: 140 LBS | RESPIRATION RATE: 16 BRPM | BODY MASS INDEX: 29.39 KG/M2 | SYSTOLIC BLOOD PRESSURE: 122 MMHG | DIASTOLIC BLOOD PRESSURE: 66 MMHG | HEART RATE: 72 BPM

## 2018-11-01 DIAGNOSIS — S82.822S: ICD-10-CM

## 2018-11-01 DIAGNOSIS — D32.9 MENINGIOMA (HCC): ICD-10-CM

## 2018-11-01 DIAGNOSIS — I63.81 LACUNAR INFARCTION (HCC): Primary | ICD-10-CM

## 2018-11-01 PROCEDURE — 99214 OFFICE O/P EST MOD 30 MIN: CPT | Performed by: NURSE PRACTITIONER

## 2018-11-01 RX ORDER — PANTOPRAZOLE SODIUM 40 MG/1
40 TABLET, DELAYED RELEASE ORAL DAILY
Qty: 90 TABLET | Refills: 3 | Status: SHIPPED | OUTPATIENT
Start: 2018-11-01 | End: 2019-10-25 | Stop reason: SDUPTHER

## 2018-11-01 NOTE — PROGRESS NOTES
Patient ID: Antonio Bergman is a 67 y o  female  Assessment/Plan:    Lacunar infarction [facility])  Doing well from a stroke standpoint, no residual deficit and no new symptoms reported that are concerning for stroke  Recent CTA was negative for any significant intracranial disease  A1c re-check was improved at 6 0  She was encouraged to maintain good control of secondary stroke risk factors, including blood pressure, cholesterol, and blood sugar  I will defer monitoring and management of these issues to her PCP  She should continue on her plavix, statin, and blood pressure medications  We discussed that any new stroke like symptoms should prompt her to call 911 or proceed to the nearest ED as soon as possible  Meningioma [facility])  Following with neurosurgery with plans for repeat MRI in March Subjective:    Ms Jodi Talavera is a 66 y/o Female who is here for follow-up of a subacute infarct in the right semi-centrum ovale infarct  She was seen by neurosurgery for evaluation of meningioma and MRI brain with and without contrast was done  Patient had a stable MRI in the past and in comparison from 11/20/17 - no change in the MRI  Patient was advised to get another repeat MRI brain in 6-12 months  She was seen by Dr Guadalupe Jolley as a new patient in July 2018  Dr Guadalupe Jolley reviewed the MRI of the brain with and without contrast which did show the left frontal meningioma and subacute infarct on the right  She felt that this was likely due to small vessel disease  It was recommended that she get a CTA to evaluated for significant vascular disease and also to continue on plavix, a statin, and appropriate blood pressure medications  CTA was done on 7/31/18: No acute intracranial disease  Footprint of chronic small vessel disease  Stable small anterior left frontal fossa meningioma without mass effect  No large vessel flow restrictive disease within the head or neck  Today Mrs Mellisa Hope presents for follow-up, accompanied by her daughter  She is overall doing well  She denies headaches, dizziness, vision changes, speech changes, swallowing difficulties, numbness or tingling, or any focal weakness  She did recently (about a month ago) sustain a mechanical fall down her garage steps while carrying pumpkins and she fractured her left ankle/fibula  She denies LOC  She did have to have surgery to repair the fracture and is now non-weight bearing  She continues on plavix and her statin without issue  She has been trying hard to watch what she eats  The following portions of the patient's history were reviewed and updated as appropriate: past family history, past social history and past surgical history  Current Outpatient Prescriptions:     acetaminophen (TYLENOL) 500 mg tablet, Take 500 mg by mouth every 6 (six) hours as needed for mild pain, Disp: , Rfl:     amLODIPine (NORVASC) 5 mg tablet, Take 5 mg by mouth daily, Disp: , Rfl:     atorvastatin (LIPITOR) 40 mg tablet, Take 40 mg by mouth daily, Disp: , Rfl:     cholecalciferol (VITAMIN D3) 1,000 units tablet, Take 2,000 Units by mouth daily, Disp: , Rfl:     clopidogrel (PLAVIX) 75 mg tablet, Take 1 tablet (75 mg total) by mouth daily, Disp: 30 tablet, Rfl: 4    metoprolol succinate (TOPROL-XL) 50 mg 24 hr tablet, Take 50 mg by mouth daily  , Disp: , Rfl:     Multiple Vitamin (MULTIVITAMIN) tablet, Take 1 tablet by mouth daily, Disp: , Rfl:     Omega-3 Fatty Acids (FISH OIL PO), Take by mouth, Disp: , Rfl:     oxybutynin (DITROPAN-XL) 5 mg 24 hr tablet, take 1 tablet by mouth once daily, Disp: 30 tablet, Rfl: 5    pantoprazole (PROTONIX) 40 mg tablet, Take 1 tablet (40 mg total) by mouth daily, Disp: 90 tablet, Rfl: 3     Objective:    Blood pressure 122/66, pulse 72, resp  rate 16, height 4' 10" (1 473 m), weight 63 5 kg (140 lb)  Physical Exam   Constitutional: She appears well-developed and well-nourished     HENT:   Head: Normocephalic  Eyes: Pupils are equal, round, and reactive to light  Lids are normal    Neurological: She has normal strength  Psychiatric: She has a normal mood and affect  Her speech is normal and behavior is normal    Vitals reviewed  Neurological Exam  Mental Status  Awake, alert and oriented to person, place and time  Speech is normal  Language is fluent with no aphasia  Attention and concentration are normal  Fund of knowledge is appropriate for level of education  Cranial Nerves  CN II: Visual acuity is normal  Visual fields full to confrontation  CN III, IV, VI: Extraocular movements intact bilaterally  Normal lids and orbits bilaterally  Pupils equal round and reactive to light bilaterally  CN V: Facial sensation is normal   CN VII: Full and symmetric facial movement  CN VIII: Hearing is normal   CN IX, X: Palate elevates symmetrically  Normal gag reflex  CN XI: Shoulder shrug strength is normal   CN XII: Tongue midline without atrophy or fasciculations  Motor   Strength is 5/5 throughout all four extremities  Sensory  Light touch is normal in upper and lower extremities  Coordination  Right: Finger-to-nose normal   Left: Finger-to-nose normal     Gait  Gait not examined due to L ankle fracture, unable to bear weight  ROS:    Review of Systems   Constitutional: Negative  Pt daughter comment that appetite has decreased   HENT: Negative  Eyes: Negative  Respiratory: Negative  Cardiovascular: Negative  Gastrointestinal: Negative  Endocrine: Negative  Genitourinary: Negative  Musculoskeletal: Positive for gait problem  Pt Fx L fibula/ankle   Skin: Negative  Allergic/Immunologic: Negative  Neurological: Negative for seizures  Hematological: Negative  Psychiatric/Behavioral: Negative

## 2018-11-01 NOTE — ASSESSMENT & PLAN NOTE
Doing well from a stroke standpoint, no residual deficit and no new symptoms reported that are concerning for stroke  Recent CTA was negative for any significant intracranial disease  A1c re-check was improved at 6 0  She was encouraged to maintain good control of secondary stroke risk factors, including blood pressure, cholesterol, and blood sugar  I will defer monitoring and management of these issues to her PCP  She should continue on her plavix, statin, and blood pressure medications  We discussed that any new stroke like symptoms should prompt her to call 911 or proceed to the nearest ED as soon as possible

## 2018-11-15 ENCOUNTER — HOSPITAL ENCOUNTER (EMERGENCY)
Facility: HOSPITAL | Age: 73
Discharge: HOME | End: 2018-11-15
Attending: EMERGENCY MEDICINE | Admitting: EMERGENCY MEDICINE
Payer: COMMERCIAL

## 2018-11-15 VITALS
RESPIRATION RATE: 18 BRPM | HEART RATE: 96 BPM | BODY MASS INDEX: 31.42 KG/M2 | OXYGEN SATURATION: 97 % | WEIGHT: 149.69 LBS | HEIGHT: 58 IN | DIASTOLIC BLOOD PRESSURE: 83 MMHG | SYSTOLIC BLOOD PRESSURE: 152 MMHG | TEMPERATURE: 99.1 F

## 2018-11-15 DIAGNOSIS — R04.0 LEFT-SIDED EPISTAXIS: Primary | ICD-10-CM

## 2018-11-15 PROCEDURE — 99283 EMERGENCY DEPT VISIT LOW MDM: CPT

## 2018-11-15 RX ORDER — OXYMETAZOLINE HYDROCHLORIDE 0.05 G/100ML
2 SPRAY NASAL ONCE
Status: COMPLETED | OUTPATIENT
Start: 2018-11-15 | End: 2018-11-15

## 2018-11-15 RX ADMIN — OXYMETAZOLINE HYDROCHLORIDE 2 SPRAY: 5 SPRAY NASAL at 17:20

## 2018-11-15 NOTE — ED PROVIDER NOTES
History  Chief Complaint   Patient presents with    Nose Bleed     per EMS patient started with nose bleed around 2 pm  taking plavix  Estevan Arias is a 67 y o  female with past medical history of CVA (currently on Plavix), hypertension, hyperlipidemia, GERD who presents to the ED with complaints of heavy bleeding from the left nostril which began approximately 2 hours prior to arrival   Patient states during the initial nose bleeds she noted clots from the left nostril  Patient states she called her PCP and was told to call an ambulance to go the emergency room for nose bleed  Patient states since arrival to the emergency room bleeding has been controlled  Patient states she has a gas burning stove in her home which causes dry air in the home  Patient states over the past few days she has had nasal congestion, rhinorrhea, and b/l ear pressure  Denies hemoptysis, blood in oropharynx, ear drainage, sore throat, intranasal medications, dizziness, chest pain, headache, syncope, fever, chills  History provided by:  Patient  Nose Bleed   Location:  L nare  Severity:  Moderate  Duration:  2 hours  Timing:  Intermittent  Progression:  Waxing and waning  Chronicity:  New  Context: anticoagulants, hypertension and weather change    Ineffective treatments:  None tried  Associated symptoms: congestion and sinus pain    Associated symptoms: no blood in oropharynx, no cough, no dizziness, no facial pain, no fever, no headaches, no sneezing, no sore throat and no syncope    Risk factors: no frequent nosebleeds, no head and neck surgery, no intranasal steroids and no sinus problems        Prior to Admission Medications   Prescriptions Last Dose Informant Patient Reported? Taking?    Multiple Vitamin (MULTIVITAMIN) tablet  Self Yes Yes   Sig: Take 1 tablet by mouth daily   Omega-3 Fatty Acids (FISH OIL PO)  Self Yes Yes   Sig: Take by mouth   acetaminophen (TYLENOL) 500 mg tablet  Self Yes Yes   Sig: Take 500 mg by mouth every 6 (six) hours as needed for mild pain   amLODIPine (NORVASC) 5 mg tablet  Self Yes Yes   Sig: Take 5 mg by mouth daily   atorvastatin (LIPITOR) 40 mg tablet  Self Yes Yes   Sig: Take 40 mg by mouth daily   cholecalciferol (VITAMIN D3) 1,000 units tablet  Self Yes Yes   Sig: Take 2,000 Units by mouth daily   clopidogrel (PLAVIX) 75 mg tablet  Self No Yes   Sig: Take 1 tablet (75 mg total) by mouth daily   metoprolol succinate (TOPROL-XL) 50 mg 24 hr tablet  Self Yes Yes   Sig: Take 50 mg by mouth daily     oxybutynin (DITROPAN-XL) 5 mg 24 hr tablet  Self No Yes   Sig: take 1 tablet by mouth once daily   pantoprazole (PROTONIX) 40 mg tablet   No Yes   Sig: Take 1 tablet (40 mg total) by mouth daily      Facility-Administered Medications: None       Past Medical History:   Diagnosis Date    Endometriosis     GERD (gastroesophageal reflux disease)     Hyperlipidemia     Hypertension     malignant / last assessed 6/16/17    IFG (impaired fasting glucose)     last assessed 6/16/17    [facility]    no residual       Past Surgical History:   Procedure Laterality Date    BUNIONECTOMY Right 2010    COLONOSCOPY      complete    HYSTERECTOMY      total abdominal with b/l ovary removal    KNEE ARTHROSCOPY Right     menisectomy    KNEE CARTILAGE SURGERY Right     theraputic    CA OPEN TX DISTAL FIBULAR FRACTURE LAT MALLEOLUS Left 10/18/2018    Procedure: OPEN REDUCTION INTERNAL FIXATION LEFT ANKLE LATERAL MALLEOLUS;  Surgeon: Rancho Thompson MD;  Location: [facility];  Service: Orthopedics       Family History   Problem Relation Age of Onset    Early death Mother     Heart attack Mother         acute MI    Heart attack Sister     Heart disease Sister         CABG    Coronary artery disease Sister     Diabetes Brother     Cancer Family         malignant neoplasm of urinary bladder     I have reviewed and agree with the history as documented      Social History   Substance Use Topics    Smoking status: Never Smoker    Smokeless tobacco: Never Used    Alcohol use No        Review of Systems   Constitutional: Negative for appetite change, chills, fever and unexpected weight change  HENT: Positive for congestion, ear pain, nosebleeds, postnasal drip and sinus pain  Negative for dental problem, drooling, ear discharge, rhinorrhea, sneezing, sore throat, trouble swallowing and voice change  Eyes: Negative for pain, discharge, redness and visual disturbance  Respiratory: Negative for cough, shortness of breath, wheezing and stridor  Cardiovascular: Negative for chest pain, palpitations, leg swelling and syncope  Gastrointestinal: Negative for abdominal pain, blood in stool, constipation, diarrhea, nausea and vomiting  Genitourinary: Negative for dysuria, flank pain, frequency, hematuria and urgency  Musculoskeletal: Negative for gait problem, joint swelling, neck pain and neck stiffness  Skin: Negative for color change and rash  Neurological: Negative for dizziness, seizures, light-headedness and headaches  Physical Exam  Physical Exam   Constitutional: She is oriented to person, place, and time  Vital signs are normal  She appears well-developed and well-nourished  She is cooperative  Non-toxic appearance  No distress  Patient expelled one large clot during initial examination   HENT:   Head: Normocephalic and atraumatic  Right Ear: Hearing, external ear and ear canal normal  Tympanic membrane is not erythematous, not retracted and not bulging  Tympanic membrane mobility is normal  A middle ear effusion is present  Left Ear: Hearing, external ear and ear canal normal  Tympanic membrane is not erythematous, not retracted and not bulging  Tympanic membrane mobility is normal  A middle ear effusion is present  Nose: Mucosal edema present  No rhinorrhea or nasal septal hematoma  No epistaxis  Eyes: Pupils are equal, round, and reactive to light   Conjunctivae and EOM are normal    Cardiovascular: Normal rate, regular rhythm and intact distal pulses  Pulmonary/Chest: Effort normal and breath sounds normal  She has no wheezes  She has no rales  Neurological: She is alert and oriented to person, place, and time  Skin: Skin is warm and dry  Capillary refill takes less than 2 seconds  Psychiatric: She has a normal mood and affect  Nursing note and vitals reviewed  Vital Signs  ED Triage Vitals [11/15/18 1617]   Temperature Pulse Respirations Blood Pressure SpO2   99 1 °F (37 3 °C) 96 18 152/83 97 %      Temp Source Heart Rate Source Patient Position - Orthostatic VS BP Location FiO2 (%)   Oral Monitor Lying Right arm --      Pain Score       --           Vitals:    11/15/18 1617   BP: 152/83   Pulse: 96   Patient Position - Orthostatic VS: Lying       Visual Acuity      ED Medications  Medications   oxymetazoline (AFRIN) 0 05 % nasal spray 2 spray (2 sprays Left Nare Given 11/15/18 1720)       Diagnostic Studies  Results Reviewed     None                 No orders to display              Procedures  Procedures       Phone Contacts  ED Phone Contact    ED Course  ED Course as of Nov 15 2049   Thu Nov 15, 2018   1631 Nasal clamp applied  Will re-evaluate patient  1702 No active bleeding noted  Patient attempted to expel contents of nostril, no active bleeding, no clots  1809 Epistaxis controlled  0 Educated patient regarding diagnosis and management  Advised patient to follow up with PCP  Advised patient to RTER for persistent or worsening symptoms  MDM  Number of Diagnoses or Management Options  Left-sided epistaxis: new and does not require workup  Diagnosis management comments: Differential diagnosis included but not limited to:   Anterior epistaxis, posterior epistaxis, prolonged bleeding due to blood thinning medications    Patient Progress  Patient progress: improved    CritCare Time    Disposition  Final diagnoses:   Left-sided epistaxis - Resolved     Time reflects when diagnosis was documented in both MDM as applicable and the Disposition within this note     Time User Action Codes Description Comment    11/15/2018  5:10 PM Delma Williamson Add [R04 0] Left-sided epistaxis     11/15/2018  5:10 PM Delma Williamson Modify [R04 0] Left-sided epistaxis Resolved      ED Disposition     ED Disposition Condition Comment    Discharge  Hernandez Ang discharge to home/self care      Condition at discharge: Good        Follow-up Information     Follow up With Specialties Details Why [address] [facility] Emergency Medicine Go to If symptoms worsen [address] [facility] [address]  [phone] [facility], [address], [place], [place], [address] Hien Velazco,[address], MD Family Medicine Schedule an appointment as soon as possible for a visit  [address] [place] [address]  [phone]             Discharge Medication List as of 11/15/2018  6:10 PM      CONTINUE these medications which have NOT CHANGED    Details   acetaminophen (TYLENOL) 500 mg tablet Take 500 mg by mouth every 6 (six) hours as needed for mild pain, Historical Med      amLODIPine (NORVASC) 5 mg tablet Take 5 mg by mouth daily, Historical Med      atorvastatin (LIPITOR) 40 mg tablet Take 40 mg by mouth daily, Historical Med      cholecalciferol (VITAMIN D3) 1,000 units tablet Take 2,000 Units by mouth daily, Historical Med      clopidogrel (PLAVIX) 75 mg tablet Take 1 tablet (75 mg total) by mouth daily, Starting Tue 7/17/2018, Normal      metoprolol succinate (TOPROL-XL) 50 mg 24 hr tablet Take 50 mg by mouth daily  , Historical Med      Multiple Vitamin (MULTIVITAMIN) tablet Take 1 tablet by mouth daily, Historical Med      Omega-3 Fatty Acids (FISH OIL PO) Take by mouth, Historical Med      oxybutynin (DITROPAN-XL) 5 mg 24 hr tablet take 1 tablet by mouth once daily, Normal      pantoprazole (PROTONIX) 40 mg tablet Take 1 tablet (40 mg total) by mouth daily, Starting u 11/1/2018, Normal           No discharge procedures on file      ED Provider  Electronically Signed by           Nadine Velarde PA-C  11/15/18 9459

## 2018-11-15 NOTE — DISCHARGE INSTRUCTIONS
Epistaxis   WHAT YOU SHOULD KNOW:   Epistaxis is a nosebleed  A nosebleed occurs when the blood vessels near the surface of the nasal cavity are injured or damaged  AFTER YOU LEAVE:   Medicines:   · Nasal sprays:  Vasoconstrictor nasal spray is a medicine that helps make nasal blood vessels narrower  This limits the blood flow and stops the bleeding  This medicine also decreases the swelling inside your nose and helps you breathe easier  You may also be directed to use saline or other nasal sprays to add moisture to your nose  · Antibiotics: This medicine is given to help treat or prevent an infection caused by bacteria  · Take your medicine as directed  Call your healthcare provider if you think your medicine is not helping or if you have side effects  Tell him if you are allergic to any medicine  Keep a list of the medicines, vitamins, and herbs you take  Include the amounts, and when and why you take them  Bring the list or the pill bottles to follow-up visits  Carry your medicine list with you in case of an emergency  Follow up with your primary healthcare provider or otolaryngologist within 2 to 3 days or as directed: Any packing in your nose should be removed within 2 to 3 days  Write down your questions so you remember to ask them during your visits  First aid:   · Sit up and lean forward: This will help prevent you from swallowing blood  Spit blood and saliva into a bowl  · Apply pressure to your nose:  Use 2 fingers to pinch your nose shut for 10 minutes  This will help stop the bleeding  Breathe through your mouth  · Apply ice:  Use a cold pack or put crushed ice in a bag, cover with a towel, and place on the bridge of your nose  · Nasal packing:  Pack your nose with a cotton ball, tissue, tampon, or gauze bandage to stop the bleeding  Prevent epistaxis:  · Avoid nose picking and blowing your nose too hard: You can irritate or damage your nose if you pick it  Blowing your nose too hard may cause the bleeding to start again  · Avoid irritants:  Substances that can irritate your nose should be avoided  These include tobacco smoke and chemical sprays such as  that contain ammonia  · Use a cool mist humidifier in your home: This will add the moisture to the air and help keep your nose moist      · Put a small amount of petroleum jelly inside your nostrils: You may apply a small amount of petroleum jelly if you do not have a nasal packing  This will help keep your nose from drying out or getting irritated  Do not put anything else inside your nose unless your primary healthcare provider tells you to do so  Contact your primary healthcare provider or otolaryngologist if:   · You have a fever and are vomiting  · You have pain in and around your nose that is getting worse even after you take pain medicines  · Your nasal pack is loose  · You have questions or concerns about your condition or care  Seek care immediately if:   · Your nasal packing is soaked with blood  · Your nose is still bleeding after 20 minutes, even after you pinch it  · You have a foul-smelling discharge coming out of your nose  · You feel so weak and dizzy that you have trouble standing up  · You have trouble breathing or talking  © 2014 [address] is for End User's use only and may not be sold, redistributed or otherwise used for commercial purposes  All illustrations and images included in CareNotes® are the copyrighted property of A D A M , Inc  or Abdias Jamison  The above information is an  only  It is not intended as medical advice for individual conditions or treatments  Talk to your doctor, nurse or pharmacist before following any medical regimen to see if it is safe and effective for you  Oxymetazoline (Into the nose)   Oxymetazoline ([id number]-COURT-oh-elijah)  Helps relieve a stuffy nose     Brand Name(s): 12-Hour Nasal, 4-Way Long Lasting, Afrin, Afrin Extra Moisturizing, Afrin No-Drip Sinus, Afrin PumpMist, Afrin Sinus, Afrin w/Menthol, Dristan 12-Hr, Duramist Plus, Good Neighbor Nasal Thida Pump, [facility] Nasal Thida, Good Sense Nasal Spray, Mucinex Full Force, Mucinex Moisture Smart   There may be other brand names for this medicine  When This Medicine Should Not Be Used: This medicine is not right for everyone  Do not use it if you had an allergic reaction to oxymetazoline  How to Use This Medicine:   Spray  · Your doctor will tell you how much medicine to use  Do not use more than directed  · Follow the instructions on the medicine label if you are using this medicine without a prescription  · If you are using the nasal spray for the first time, you will need to prime the spray  To do this, pump or squeeze the bottle until some of the medicine sprays out  Now it is ready to use  Prime the spray after each time you clean the pump, or if you have not used the medicine for 5 days or longer  · Shake the nasal spray well before each use  · Before using the medicine, gently blow your nose to clear the nostrils  · Keep your head upright while spraying the medicine into each nostril  Repeat until you have used 2 to 3 sprays in each nostril  · Do not use more than two times in 24 hours, unless your doctor tells you to  · Do not share this medicine with other people  · After using the nasal spray, wipe the tip of the bottle with a clean tissue and put the cap back on   · Missed dose: Take a dose as soon as you remember  If it is almost time for your next dose, wait until then and take a regular dose  Do not take extra medicine to make up for a missed dose  · Keep the bottle tightly closed when not using it  Store at room temperature, away from heat and direct light    Drugs and Foods to Avoid:      Ask your doctor or pharmacist before using any other medicine, including over-the-counter medicines, vitamins, and herbal products  Warnings While Using This Medicine:   · Tell your doctor if you are pregnant or breastfeeding, or if you have heart disease, high blood pressure, diabetes, thyroid problems, or an enlarged prostate  · Do not use this medicine for more than 3 days unless your doctor tell you to  · Call your doctor if your symptoms do not improve or if they get worse  · Keep all medicine out of the reach of children  Never share your medicine with anyone  Possible Side Effects While Using This Medicine:   Call your doctor right away if you notice any of these side effects:  · Allergic reaction: Itching or hives, swelling in your face or hands, swelling or tingling in your mouth or throat, chest tightness, trouble breathing  If you notice these less serious side effects, talk with your doctor:   · Burning or stinging inside of your nose  If you notice other side effects that you think are caused by this medicine, tell your doctor  Call your doctor for medical advice about side effects  You may report side effects to FDA at 1-995-FDA-4712  © 2017 [address] Information is for End User's use only and may not be sold, redistributed or otherwise used for commercial purposes  The above information is an  only  It is not intended as medical advice for individual conditions or treatments  Talk to your doctor, nurse or pharmacist before following any medical regimen to see if it is safe and effective for you

## 2018-11-19 ENCOUNTER — TELEPHONE (OUTPATIENT)
Dept: FAMILY MEDICINE CLINIC | Facility: CLINIC | Age: 73
End: 2018-11-19

## 2018-11-19 NOTE — TELEPHONE ENCOUNTER
Patient called  She was seen in ER on 11/15/18 for bloody nose  She had one again on Friday, but none since then  The ER doc felt it was due to dry heat in her home  She has been using nasal spray and vaseline to treat  She wants to know if she needs to come in to f/u- she has a broken ankle and it is difficult for her to get around  Patient's main concern is that she is on plavix and fish oil  She heard that fish oil can be a blood thinner and she doesn't know if she should be taking it or not

## 2018-11-27 ENCOUNTER — OFFICE VISIT (OUTPATIENT)
Dept: OBGYN CLINIC | Facility: CLINIC | Age: 73
End: 2018-11-27

## 2018-11-27 ENCOUNTER — APPOINTMENT (OUTPATIENT)
Dept: RADIOLOGY | Facility: CLINIC | Age: 73
End: 2018-11-27
Payer: COMMERCIAL

## 2018-11-27 VITALS — DIASTOLIC BLOOD PRESSURE: 31 MMHG | HEART RATE: 66 BPM | SYSTOLIC BLOOD PRESSURE: 134 MMHG

## 2018-11-27 DIAGNOSIS — S82.832A OTHER CLOSED FRACTURE OF DISTAL END OF LEFT FIBULA, INITIAL ENCOUNTER: Primary | ICD-10-CM

## 2018-11-27 DIAGNOSIS — S82.832A OTHER CLOSED FRACTURE OF DISTAL END OF LEFT FIBULA, INITIAL ENCOUNTER: ICD-10-CM

## 2018-11-27 PROCEDURE — 4040F PNEUMOC VAC/ADMIN/RCVD: CPT | Performed by: ORTHOPAEDIC SURGERY

## 2018-11-27 PROCEDURE — 99024 POSTOP FOLLOW-UP VISIT: CPT | Performed by: ORTHOPAEDIC SURGERY

## 2018-11-27 PROCEDURE — 73610 X-RAY EXAM OF ANKLE: CPT

## 2018-11-27 NOTE — PROGRESS NOTES
Patient Name:  Estevan Arias  MRN:  [id number]    Assessment     1  Other closed fracture of distal end of left fibula, initial encounter  XR ankle 3+ vw left       Plan     ORIF left ankle lateral malleolus 10/18/18  1  Weightbearing as tolerated left lower extremity in Cam walking boot for the next two weeks  2  After two weeks she may transition to normal shoes as tolerated  3  Follow-up in four weeks for repeat evaluation with repeat x-rays of the left ankle  Subjective     77-year-old female returns to the office today for follow-up status post ORIF left ankle lateral malleolus 10/18/18  Today she denies any pain in the ankle  She notes mild persistent swelling which is improving  She notes stiffness as well  No numbness or tingling  No fevers or chills  Objective     BP (!) 134/31   Pulse 66     Left ankle:   No gross deformity  Skin intact  Incision is well healed without erythema or drainage  No significant tenderness to palpation along the distal fibula  Ankle range of motion is intact and nearly full without discomfort  Negative squeeze test   Toes warm and mobile  Sensation intact left lower extremity  2+ DP pulse  Data Review     I have personally reviewed pertinent films in PACS, and my interpretation follows  X-rays performed today of the left ankle reveals stable intact orthopedic hardware in satisfactory alignment  Significant interval bony healing evident        Scribe Attestation    I,:   Enedina Tello PA-C am acting as a scribe while in the presence of the attending physician :        I,:   Obi Zamudio MD personally performed the services described in this documentation    as scribed in my presence :

## 2018-12-03 ENCOUNTER — TELEPHONE (OUTPATIENT)
Dept: OBGYN CLINIC | Facility: HOSPITAL | Age: 73
End: 2018-12-03

## 2018-12-03 DIAGNOSIS — S82.832A OTHER CLOSED FRACTURE OF DISTAL END OF LEFT FIBULA, INITIAL ENCOUNTER: Primary | ICD-10-CM

## 2018-12-03 NOTE — TELEPHONE ENCOUNTER
Dr Sergio Badillo    Patient called to see about getting an order for a shower chair  initially this was denied by medicare but is asking for an order to see if it will be covered from us  She needed it for the shower after her ankle surgery on 10/18/18  It sounds like her Neurologist sent in the first request for her and it was denied  Patient would like this mailed to her home  [facility] already supplied the bench and patient is trying to get reimbursed        Patients address: [address] [address]

## 2018-12-27 DIAGNOSIS — N32.81 OAB (OVERACTIVE BLADDER): ICD-10-CM

## 2018-12-27 RX ORDER — OXYBUTYNIN CHLORIDE 5 MG/1
5 TABLET, EXTENDED RELEASE ORAL DAILY
Qty: 30 TABLET | Refills: 5 | Status: SHIPPED | OUTPATIENT
Start: 2018-12-27 | End: 2019-07-08 | Stop reason: SDUPTHER

## 2018-12-27 NOTE — TELEPHONE ENCOUNTER
Called dentist office, spoke to Cora cadet, she states the dentist is out to lunch right now but will find if the dentist wants plavix held or not, Dr Kaiser Hernández states she is okay with plavix being held  The current medical regimen is effective;  continue present plan and medications.

## 2018-12-28 ENCOUNTER — APPOINTMENT (OUTPATIENT)
Dept: RADIOLOGY | Facility: CLINIC | Age: 73
End: 2018-12-28
Payer: COMMERCIAL

## 2018-12-28 ENCOUNTER — OFFICE VISIT (OUTPATIENT)
Dept: OBGYN CLINIC | Facility: CLINIC | Age: 73
End: 2018-12-28

## 2018-12-28 VITALS
SYSTOLIC BLOOD PRESSURE: 151 MMHG | HEIGHT: 58 IN | DIASTOLIC BLOOD PRESSURE: 78 MMHG | BODY MASS INDEX: 29.18 KG/M2 | HEART RATE: 76 BPM | WEIGHT: 139 LBS

## 2018-12-28 DIAGNOSIS — M25.572 PAIN, JOINT, ANKLE AND FOOT, LEFT: ICD-10-CM

## 2018-12-28 DIAGNOSIS — S82.62XD CLOSED DISPLACED FRACTURE OF LATERAL MALLEOLUS OF LEFT FIBULA WITH ROUTINE HEALING, SUBSEQUENT ENCOUNTER: Primary | ICD-10-CM

## 2018-12-28 PROCEDURE — 73610 X-RAY EXAM OF ANKLE: CPT

## 2018-12-28 PROCEDURE — 99024 POSTOP FOLLOW-UP VISIT: CPT | Performed by: ORTHOPAEDIC SURGERY

## 2018-12-28 NOTE — PROGRESS NOTES
Patient Name:  Deana Workman  MRN:  [id number]    Assessment     1  Closed displaced fracture of lateral malleolus of left fibula with routine healing, subsequent encounter  XR ankle 3+ vw left       Plan     1  Resume normal activity as tolerated, modify as needed  2  Discussed physical therapy as an alternative  Patient will contact me if interested  3  Follow-up in 6-8 weeks if pain persists  We also discussed follow-up evaluation for her left knee for which she has been considering knee replacement  Return if symptoms worsen or fail to improve  Subjective     Postop evaluation after open reduction internal fixation left lateral malleolus 10/18/18  She reports no pain at this time  She does notice small prominence at the proximal extent of the incision  She has occasional mild swelling and slight redness diffusely around the ankle, especially when she has been on her feet for long periods  She has otherwise return to her normal activities without the boot  She did wear the boot for a short time around the holidays when she was much more active than usual       Objective     /78   Pulse 76   Ht 4' 10" (1 473 m)   Wt 63 kg (139 lb)   BMI 29 05 kg/m²     Left ankle:  Incision is healed without erythema  Nontender to palpation  Range of motion is dorsiflexion 5° and plantar flexion 45°  Stable to anterior drawer test and talar tilt test   Squeeze test is negative  Sensation intact to light touch superficial peroneal, deep peroneal, and tibial nerve distribution  2+ DP pulse  Data Review     I have personally reviewed pertinent films in PACS, and my interpretation follows  X-rays left ankle 12/28/18:  Healing lateral malleolus fracture in satisfactory alignment

## 2019-01-04 DIAGNOSIS — I63.81 LACUNAR INFARCTION (HCC): ICD-10-CM

## 2019-01-04 RX ORDER — CLOPIDOGREL BISULFATE 75 MG/1
TABLET ORAL
Qty: 90 TABLET | Refills: 1 | Status: SHIPPED | OUTPATIENT
Start: 2019-01-04 | End: 2019-07-04 | Stop reason: SDUPTHER

## 2019-02-13 ENCOUNTER — TELEPHONE (OUTPATIENT)
Dept: FAMILY MEDICINE CLINIC | Facility: CLINIC | Age: 74
End: 2019-02-13

## 2019-02-14 DIAGNOSIS — E55.9 VITAMIN D DEFICIENCY: ICD-10-CM

## 2019-02-14 DIAGNOSIS — E78.2 MIXED HYPERLIPIDEMIA: ICD-10-CM

## 2019-02-14 DIAGNOSIS — E11.9 TYPE 2 DIABETES MELLITUS WITHOUT COMPLICATION, WITHOUT LONG-TERM CURRENT USE OF INSULIN (HCC): Primary | ICD-10-CM

## 2019-02-14 DIAGNOSIS — I63.81 LACUNAR INFARCTION (HCC): ICD-10-CM

## 2019-02-14 DIAGNOSIS — I10 ESSENTIAL HYPERTENSION: ICD-10-CM

## 2019-02-17 DIAGNOSIS — E78.2 MIXED HYPERLIPIDEMIA: ICD-10-CM

## 2019-02-17 RX ORDER — ATORVASTATIN CALCIUM 40 MG/1
TABLET, FILM COATED ORAL
Qty: 90 TABLET | Refills: 3 | Status: SHIPPED | OUTPATIENT
Start: 2019-02-17 | End: 2019-02-28 | Stop reason: ALTCHOICE

## 2019-02-20 ENCOUNTER — APPOINTMENT (OUTPATIENT)
Dept: LAB | Facility: CLINIC | Age: 74
End: 2019-02-20
Payer: COMMERCIAL

## 2019-02-20 DIAGNOSIS — R90.0 INTRACRANIAL MASS: ICD-10-CM

## 2019-02-20 DIAGNOSIS — D32.9 MENINGIOMA (HCC): ICD-10-CM

## 2019-02-20 DIAGNOSIS — Z01.818 PREPROCEDURAL EXAMINATION: ICD-10-CM

## 2019-02-20 LAB
ALBUMIN SERPL BCP-MCNC: 4 G/DL (ref 3.5–5)
ALP SERPL-CCNC: 128 U/L (ref 46–116)
ALT SERPL W P-5'-P-CCNC: 47 U/L (ref 12–78)
ANION GAP SERPL CALCULATED.3IONS-SCNC: 13 MMOL/L (ref 4–13)
AST SERPL W P-5'-P-CCNC: 39 U/L (ref 5–45)
BASOPHILS # BLD AUTO: 0.05 THOUSANDS/ΜL (ref 0–0.1)
BASOPHILS NFR BLD AUTO: 1 % (ref 0–1)
BILIRUB SERPL-MCNC: 0.9 MG/DL (ref 0.2–1)
BUN SERPL-MCNC: 17 MG/DL (ref 5–25)
CALCIUM SERPL-MCNC: 9.5 MG/DL (ref 8.3–10.1)
CHLORIDE SERPL-SCNC: 102 MMOL/L (ref 100–108)
CHOLEST SERPL-MCNC: 202 MG/DL (ref 50–200)
CO2 SERPL-SCNC: 23 MMOL/L (ref 21–32)
CREAT SERPL-MCNC: 0.73 MG/DL (ref 0.6–1.3)
CREAT UR-MCNC: 95.9 MG/DL
EOSINOPHIL # BLD AUTO: 0.2 THOUSAND/ΜL (ref 0–0.61)
EOSINOPHIL NFR BLD AUTO: 3 % (ref 0–6)
ERYTHROCYTE [DISTWIDTH] IN BLOOD BY AUTOMATED COUNT: 13.2 % (ref 11.6–15.1)
EST. AVERAGE GLUCOSE BLD GHB EST-MCNC: 148 MG/DL
GFR SERPL CREATININE-BSD FRML MDRD: 82 ML/MIN/1.73SQ M
GLUCOSE P FAST SERPL-MCNC: 153 MG/DL (ref 65–99)
HBA1C MFR BLD: 6.8 % (ref 4.2–6.3)
HCT VFR BLD AUTO: 45.4 % (ref 34.8–46.1)
HDLC SERPL-MCNC: 42 MG/DL (ref 40–60)
HGB BLD-MCNC: 15.2 G/DL (ref 11.5–15.4)
IMM GRANULOCYTES # BLD AUTO: 0.03 THOUSAND/UL (ref 0–0.2)
IMM GRANULOCYTES NFR BLD AUTO: 0 % (ref 0–2)
LDLC SERPL CALC-MCNC: 122 MG/DL (ref 0–100)
LYMPHOCYTES # BLD AUTO: 3.2 THOUSANDS/ΜL (ref 0.6–4.47)
LYMPHOCYTES NFR BLD AUTO: 43 % (ref 14–44)
MCH RBC QN AUTO: 29.8 PG (ref 26.8–34.3)
MCHC RBC AUTO-ENTMCNC: 33.5 G/DL (ref 31.4–37.4)
MCV RBC AUTO: 89 FL (ref 82–98)
MICROALBUMIN UR-MCNC: 21.6 MG/L (ref 0–20)
MICROALBUMIN/CREAT 24H UR: 23 MG/G CREATININE (ref 0–30)
MONOCYTES # BLD AUTO: 0.53 THOUSAND/ΜL (ref 0.17–1.22)
MONOCYTES NFR BLD AUTO: 7 % (ref 4–12)
NEUTROPHILS # BLD AUTO: 3.38 THOUSANDS/ΜL (ref 1.85–7.62)
NEUTS SEG NFR BLD AUTO: 46 % (ref 43–75)
NONHDLC SERPL-MCNC: 160 MG/DL
NRBC BLD AUTO-RTO: 0 /100 WBCS
PLATELET # BLD AUTO: 220 THOUSANDS/UL (ref 149–390)
PMV BLD AUTO: 11.4 FL (ref 8.9–12.7)
POTASSIUM SERPL-SCNC: 3.7 MMOL/L (ref 3.5–5.3)
PROT SERPL-MCNC: 7.8 G/DL (ref 6.4–8.2)
RBC # BLD AUTO: 5.1 MILLION/UL (ref 3.81–5.12)
SODIUM SERPL-SCNC: 138 MMOL/L (ref 136–145)
TRIGL SERPL-MCNC: 191 MG/DL
TSH SERPL DL<=0.05 MIU/L-ACNC: 2.58 UIU/ML (ref 0.36–3.74)
WBC # BLD AUTO: 7.39 THOUSAND/UL (ref 4.31–10.16)

## 2019-02-20 PROCEDURE — 80061 LIPID PANEL: CPT | Performed by: FAMILY MEDICINE

## 2019-02-20 PROCEDURE — 80053 COMPREHEN METABOLIC PANEL: CPT | Performed by: FAMILY MEDICINE

## 2019-02-20 PROCEDURE — 36415 COLL VENOUS BLD VENIPUNCTURE: CPT | Performed by: FAMILY MEDICINE

## 2019-02-20 PROCEDURE — 84443 ASSAY THYROID STIM HORMONE: CPT | Performed by: FAMILY MEDICINE

## 2019-02-20 PROCEDURE — 3061F NEG MICROALBUMINURIA REV: CPT | Performed by: FAMILY MEDICINE

## 2019-02-20 PROCEDURE — 83036 HEMOGLOBIN GLYCOSYLATED A1C: CPT | Performed by: FAMILY MEDICINE

## 2019-02-20 PROCEDURE — 82570 ASSAY OF URINE CREATININE: CPT | Performed by: FAMILY MEDICINE

## 2019-02-20 PROCEDURE — 82043 UR ALBUMIN QUANTITATIVE: CPT | Performed by: FAMILY MEDICINE

## 2019-02-20 PROCEDURE — 85025 COMPLETE CBC W/AUTO DIFF WBC: CPT | Performed by: FAMILY MEDICINE

## 2019-02-26 ENCOUNTER — OFFICE VISIT (OUTPATIENT)
Dept: OBGYN CLINIC | Facility: CLINIC | Age: 74
End: 2019-02-26
Payer: COMMERCIAL

## 2019-02-26 VITALS
DIASTOLIC BLOOD PRESSURE: 84 MMHG | WEIGHT: 140 LBS | SYSTOLIC BLOOD PRESSURE: 149 MMHG | HEART RATE: 90 BPM | HEIGHT: 58 IN | BODY MASS INDEX: 29.39 KG/M2

## 2019-02-26 DIAGNOSIS — S82.62XD CLOSED DISPLACED FRACTURE OF LATERAL MALLEOLUS OF LEFT FIBULA WITH ROUTINE HEALING, SUBSEQUENT ENCOUNTER: ICD-10-CM

## 2019-02-26 DIAGNOSIS — M17.12 PRIMARY OSTEOARTHRITIS OF LEFT KNEE: Primary | ICD-10-CM

## 2019-02-26 PROBLEM — S82.832A CLOSED FRACTURE OF DISTAL END OF LEFT FIBULA: Status: RESOLVED | Noted: 2018-10-16 | Resolved: 2019-02-26

## 2019-02-26 PROCEDURE — 99213 OFFICE O/P EST LOW 20 MIN: CPT | Performed by: ORTHOPAEDIC SURGERY

## 2019-02-26 PROCEDURE — 20610 DRAIN/INJ JOINT/BURSA W/O US: CPT | Performed by: ORTHOPAEDIC SURGERY

## 2019-02-26 RX ORDER — METHYLPREDNISOLONE ACETATE 40 MG/ML
1 INJECTION, SUSPENSION INTRA-ARTICULAR; INTRALESIONAL; INTRAMUSCULAR; SOFT TISSUE
Status: COMPLETED | OUTPATIENT
Start: 2019-02-26 | End: 2019-02-26

## 2019-02-26 RX ADMIN — METHYLPREDNISOLONE ACETATE 1 ML: 40 INJECTION, SUSPENSION INTRA-ARTICULAR; INTRALESIONAL; INTRAMUSCULAR; SOFT TISSUE at 12:02

## 2019-02-26 NOTE — PROGRESS NOTES
Patient Name:  Anders Wallace  MRN:  [id number]    Assessment & Plan     4 months s/p ORIF left lateral malleolus performed on 10/18/18  Left Knee moderate medial and patellofemoral osteoarthritis    1  May perform activities as tolerated  2  Given cortisone injection today into her left knee due to intermittent symptoms  3  If symptoms persist after cortisone injection or she only receives minimal benefit from the injection then Synvisc One injection will be ordered  4  She will follow up in 3 months for re evaluation of her left knee      Subjective     67 y/o female who presents today for a follow up visit for her left ankle and knee  Patient reports no pain in her left ankle  She continues to note a small prominence at the proximal extent of the incision  Still has occasional mild swelling but no pain or tenderness  Today, she noting her main complaint is her intermittent symptoms in her left knee  She has known osteoarthritis of her left knee  She has seen Dr Storey in the past and received intermittent cortisone injections with appreciable relief  Her symptoms are worse with prolonged walking/standing  General ROS:  Negative for fever or chills  Neurological ROS:  Negative for numbness or tingling  Objective     /84   Pulse 90   Ht 4' 10" (1 473 m)   Wt 63 5 kg (140 lb)   BMI 29 26 kg/m²     Left Ankle:  Skin is intact  Incision remains healed without erythema or swelling  Small subcutaneous nodule posterior to the proximal incision, possible epidermal inclusion cyst   No tenderness to palpation  ROM: Dorsiflexion: 5, Plantarflexion: 45   Stable anterior drawer test    Patient is neurovascular intact distally  2+ DP pulse  Left knee:  Skin is intact  No erythema, ecchymosis or swelling  Mild tenderness to palpation along the medial joint line  Range of motion is full without pain  5/5 strength of her quadriceps, hamstrings  Stable to varus valgus stress    Gait is not antalgic    Large joint arthrocentesis: L knee  Date/Time: 2/26/2019 12:02 PM  Consent given by: patient  Site marked: site marked  Timeout: Immediately prior to procedure a time out was called to verify the correct patient, procedure, equipment, support staff and site/side marked as required   Supporting Documentation  Indications: pain and diagnostic evaluation   Procedure Details  Location: knee - L knee  Preparation: Patient was prepped and draped in the usual sterile fashion  Needle size: 22 G  Ultrasound guidance: no  Approach: anterolateral  Medications administered: 1 mL methylPREDNISolone acetate 40 mg/mL    Patient tolerance: patient tolerated the procedure well with no immediate complications  Dressing:  Sterile dressing applied              Scribe Attestation    I,:   Jessika Boykin am acting as a scribe while in the presence of the attending physician :        I,:   Loli Shepherd MD personally performed the services described in this documentation    as scribed in my presence :

## 2019-02-27 PROBLEM — H90.3 SENSORINEURAL HEARING LOSS (SNHL), BILATERAL: Status: ACTIVE | Noted: 2019-02-27

## 2019-02-28 ENCOUNTER — OFFICE VISIT (OUTPATIENT)
Dept: FAMILY MEDICINE CLINIC | Facility: CLINIC | Age: 74
End: 2019-02-28
Payer: COMMERCIAL

## 2019-02-28 VITALS
HEART RATE: 73 BPM | HEIGHT: 56 IN | TEMPERATURE: 97.5 F | OXYGEN SATURATION: 98 % | WEIGHT: 142.4 LBS | RESPIRATION RATE: 16 BRPM | DIASTOLIC BLOOD PRESSURE: 70 MMHG | BODY MASS INDEX: 32.03 KG/M2 | SYSTOLIC BLOOD PRESSURE: 110 MMHG

## 2019-02-28 DIAGNOSIS — I10 ESSENTIAL HYPERTENSION: ICD-10-CM

## 2019-02-28 DIAGNOSIS — I63.81 LACUNAR INFARCTION (HCC): ICD-10-CM

## 2019-02-28 DIAGNOSIS — E78.2 MIXED HYPERLIPIDEMIA: ICD-10-CM

## 2019-02-28 DIAGNOSIS — D32.9 MENINGIOMA (HCC): ICD-10-CM

## 2019-02-28 DIAGNOSIS — Z00.00 MEDICARE ANNUAL WELLNESS VISIT, SUBSEQUENT: ICD-10-CM

## 2019-02-28 DIAGNOSIS — E11.29 TYPE 2 DIABETES MELLITUS WITH MICROALBUMINURIA, WITHOUT LONG-TERM CURRENT USE OF INSULIN (HCC): Primary | ICD-10-CM

## 2019-02-28 DIAGNOSIS — E66.9 OBESITY (BMI 30-39.9): ICD-10-CM

## 2019-02-28 DIAGNOSIS — R80.9 TYPE 2 DIABETES MELLITUS WITH MICROALBUMINURIA, WITHOUT LONG-TERM CURRENT USE OF INSULIN (HCC): Primary | ICD-10-CM

## 2019-02-28 DIAGNOSIS — Z12.31 ENCOUNTER FOR SCREENING MAMMOGRAM FOR BREAST CANCER: ICD-10-CM

## 2019-02-28 DIAGNOSIS — E55.9 VITAMIN D DEFICIENCY: ICD-10-CM

## 2019-02-28 DIAGNOSIS — F41.8 SITUATIONAL ANXIETY: ICD-10-CM

## 2019-02-28 DIAGNOSIS — N32.81 OAB (OVERACTIVE BLADDER): ICD-10-CM

## 2019-02-28 PROBLEM — H91.20 SUDDEN-ONSET SENSORINEURAL HEARING LOSS: Status: RESOLVED | Noted: 2017-12-06 | Resolved: 2019-02-28

## 2019-02-28 PROCEDURE — 1170F FXNL STATUS ASSESSED: CPT | Performed by: FAMILY MEDICINE

## 2019-02-28 PROCEDURE — 3078F DIAST BP <80 MM HG: CPT | Performed by: FAMILY MEDICINE

## 2019-02-28 PROCEDURE — 1101F PT FALLS ASSESS-DOCD LE1/YR: CPT | Performed by: FAMILY MEDICINE

## 2019-02-28 PROCEDURE — 99214 OFFICE O/P EST MOD 30 MIN: CPT | Performed by: FAMILY MEDICINE

## 2019-02-28 PROCEDURE — 3074F SYST BP LT 130 MM HG: CPT | Performed by: FAMILY MEDICINE

## 2019-02-28 PROCEDURE — 3725F SCREEN DEPRESSION PERFORMED: CPT | Performed by: FAMILY MEDICINE

## 2019-02-28 PROCEDURE — 1125F AMNT PAIN NOTED PAIN PRSNT: CPT | Performed by: FAMILY MEDICINE

## 2019-02-28 PROCEDURE — G0439 PPPS, SUBSEQ VISIT: HCPCS | Performed by: FAMILY MEDICINE

## 2019-02-28 RX ORDER — METOPROLOL SUCCINATE 50 MG/1
50 TABLET, EXTENDED RELEASE ORAL DAILY
Qty: 90 TABLET | Refills: 3 | Status: SHIPPED | OUTPATIENT
Start: 2019-02-28 | End: 2020-02-19

## 2019-02-28 RX ORDER — LORAZEPAM 1 MG/1
TABLET ORAL
Qty: 2 TABLET | Refills: 0 | Status: SHIPPED | OUTPATIENT
Start: 2019-02-28 | End: 2019-10-10 | Stop reason: ALTCHOICE

## 2019-02-28 NOTE — PROGRESS NOTES
Assessment and Plan:    Problem List Items Addressed This Visit        Endocrine    Diabetes mellitus, type 2 ([facility] ) - Primary    Relevant Orders    Hemoglobin A1C       Cardiovascular and Mediastinum    Essential hypertension    Relevant Medications    metoprolol succinate (TOPROL-XL) 50 mg 24 hr tablet    Other Relevant Orders    CBC and differential    Comprehensive metabolic panel       Nervous and Auditory    Meningioma (HCC)    Lacunar infarction       Genitourinary    OAB (overactive bladder)       Other    Mixed hyperlipidemia    Relevant Orders    Lipid panel    TSH, 3rd generation with Free T4 reflex    Vitamin D deficiency    Relevant Orders    Vitamin D 25 hydroxy      Other Visit Diagnoses     Medicare annual wellness visit, subsequent        Obesity (BMI 30-39  9)        Situational anxiety        Relevant Medications    LORazepam (ATIVAN) 1 mg tablet    Encounter for screening mammogram for breast cancer        Relevant Orders    Mammo screening bilateral w cad        Health Maintenance Due   Topic Date Due    HEPATITIS B VACCINES (1 of 3 - Risk 3-dose series) 11/26/1964    DM Eye Exam  06/25/2017    SLP PLAN OF CARE  03/25/2018    INFLUENZA VACCINE  07/01/2018    Medicare Annual Wellness Visit (AWV)  02/23/2019    Diabetic Foot Exam  02/23/2019     BMI plan see OV note 02/28/2019  She is due for eye exam  She is not interested in a flu vaccine or Hep B  Foot exam deferred  HPI:  Anshu Hickman is a 68 y o  female here for her Subsequent Wellness Visit      Patient Active Problem List   Diagnosis    Essential hypertension    Mixed hyperlipidemia    Gastroesophageal reflux disease    Diabetes mellitus, type 2 ([facility] [address] )    Low bone mass    Meningioma (HCC)    OAB (overactive bladder)    Primary osteoarthritis of left knee    Rosacea    Vitamin D deficiency    Lacunar infarction    Sensorineural hearing loss (SNHL), bilateral     Past Medical History:   Diagnosis Date    Endometriosis  GERD (gastroesophageal reflux disease)     Hyperlipidemia     Hypertension     malignant / last assessed 6/16/17    IFG (impaired fasting glucose)     last assessed 6/16/17    Stroke ([address] ) 06/2018    no residual    Sudden-onset sensorineural hearing loss 12/6/2017     Past Surgical History:   Procedure Laterality Date    BUNIONECTOMY Right 2010    COLONOSCOPY      complete    HYSTERECTOMY      total abdominal with b/l ovary removal    KNEE ARTHROSCOPY Right     menisectomy    KNEE CARTILAGE SURGERY Right     theraputic    TX OPEN TX DISTAL FIBULAR FRACTURE LAT MALLEOLUS Left 10/18/2018    Procedure: OPEN REDUCTION INTERNAL FIXATION LEFT ANKLE LATERAL MALLEOLUS;  Surgeon: Jose Reyes MD;  Location: [facility];  Service: Orthopedics     Family History   Problem Relation Age of Onset    Early death Mother     Heart attack Mother         acute MI    Heart attack Sister     Heart disease Sister         CABG    Coronary artery disease Sister     Diabetes Brother     Cancer Family         malignant neoplasm of urinary bladder     Social History     Tobacco Use   Smoking Status Never Smoker   Smokeless Tobacco Never Used     Social History     Substance and Sexual Activity   Alcohol Use No      Social History     Substance and Sexual Activity   Drug Use No       Current Outpatient Medications   Medication Sig Dispense Refill    acetaminophen (TYLENOL) 500 mg tablet Take 500 mg by mouth every 6 (six) hours as needed for mild pain      amLODIPine (NORVASC) 5 mg tablet Take 5 mg by mouth daily      atorvastatin (LIPITOR) 40 mg tablet Take 40 mg by mouth daily      cholecalciferol (VITAMIN D3) 1,000 units tablet Take 2,000 Units by mouth daily      clopidogrel (PLAVIX) 75 mg tablet take 1 tablet by mouth once daily 90 tablet 1    metoprolol succinate (TOPROL-XL) 50 mg 24 hr tablet Take 1 tablet (50 mg total) by mouth daily for 90 days 90 tablet 3    Multiple Vitamin (MULTIVITAMIN) tablet Take 1 tablet by mouth daily      oxybutynin (DITROPAN-XL) 5 mg 24 hr tablet Take 1 tablet (5 mg total) by mouth daily 30 tablet 5    pantoprazole (PROTONIX) 40 mg tablet Take 1 tablet (40 mg total) by mouth daily 90 tablet 3    LORazepam (ATIVAN) 1 mg tablet 1 tablet one hour before MRI  2 tablet 0     No current facility-administered medications for this visit  Allergies   Allergen Reactions    Oxycodone Nausea Only     Vomits from medication every time  She likes tylenol with codeine, that does not make her sick    Penicillins Rash     Immunization History   Administered Date(s) Administered    Influenza Split High Dose Preservative Free IM 10/16/2013, 10/22/2014, 11/09/2016    Influenza TIV (IM) 09/09/2011, 10/16/2012    Pneumococcal Conjugate 13-Valent 11/09/2016    Pneumococcal Polysaccharide PPV23 04/22/2014    Td (adult), adsorbed 12/08/1998    Tdap 07/18/2016    Zoster 08/25/2017       Patient Care Team:  Adelaida Stevens MD as PCP - General  MD Harjeet Morales MD Shanna Luster, PA-C    Medicare Screening Tests and Risk Assessments:  Amelia Shaw is here for her Subsequent Wellness visit  Last Medicare Wellness visit information reviewed, patient interviewed and updates made to the record today  Health Risk Assessment:  Patient rates overall health as very good  Patient feels that their physical health rating is Much better  Eyesight was rated as Same  Hearing was rated as Same  Patient feels that their emotional and mental health rating is Much better  Pain experienced by patient in the last 7 days has been None  Emotional/Mental Health:  Patient has been feeling nervous/anxious  PHQ-9 Depression Screening:    Frequency of the following problems over the past two weeks:      1  Little interest or pleasure in doing things: 0 - not at all      2  Feeling down, depressed, or hopeless: 0 - not at all  PHQ-2 Score: 0          Broken Bones/Falls: Fall Risk Assessment:     In the past year, patient has experienced: No history of falling in past year          Bladder/Bowel:  Patient has leaked urine accidently in the last six months  Patient reports no loss of bowel control  Immunizations:  Patient has not had a flu vaccination within the last year  Patient has received a pneumonia shot  Patient has received a shingles shot  Patient has received tetanus/diphtheria shot  Home Safety:  Patient does not have trouble with stairs inside or outside of their home  Patient currently reports that there are no safety hazards present in home, working smoke alarms, working carbon monoxide detectors  Preventative Screenings:   Breast cancer screening performed, colon cancer screen completed, cholesterol screen completed, 9/1/2018  glaucoma eye exam completed, 1/1/2019  (Additional Comments: Colonoscopy 12/2015  Mammogram 01/2018  Lipid profile 02/2019  )    Nutrition:  Current diet: Frequent junk food and No Added Salt with servings of the following:    Medications:  Patient is currently taking over-the-counter supplements  List of OTC medications includes: tylenol, vitamin D   Patient is able to manage medications  Lifestyle Choices:  Patient reports no tobacco use  Patient has not smoked or used tobacco in the past   Patient reports no alcohol use  Patient drives a vehicle  Patient wears seat belt  Activities of Daily Living:  Can get out of bed by his or her self, able to dress self, able to make own meals, able to do own shopping, able to bathe self, can do own laundry/housekeeping, can manage own money, pay bills and track expenses    Previous Hospitalizations:  Hospitalization or ED visit in past 12 months  Number of hospitalizations within the last year: 1-2        Advanced Directives:  Patient has decided on a power of   Patient has not spoken to designated power of   Patient has completed advanced directive          Preventative Screening/Counseling:      Cardiovascular:      General: Screening Not Indicated      Counseling: Healthy Weight and Healthy Diet          Diabetes:      General: Screening Not Indicated      Comments: N/A type 2 DM         Colorectal Cancer:      General: Screening Current          Cervical Cancer:      General: Screening Not Indicated          Osteoporosis:      General: Screening Current          AAA:      General: Screening Not Indicated          Glaucoma:      General: Screening Current          HIV:      General: Screening Not Indicated          Hepatitis C:      General: Risks and Benefits Discussed        Advanced Directives:   Patient has living will for healthcare, has durable POA for healthcare, patient has an advanced directive  Immunizations:      Influenza: Risks & Benefits Discussed and Patient Declines      Pneumococcal: Lifetime Vaccine Completed      Zostavax: Zostavax Vaccine UTD      TDAP: Tdap Vaccine UTD      Other Preventative Counseling (Non-Medicare):   Fall Prevention, Increase physical activity, Nutrition Counseling and Weight reduction discussed      Referrals:  Referral(s) to: Otolaryngology

## 2019-02-28 NOTE — PROGRESS NOTES
Assessment/Plan:         Diagnoses and all orders for this visit:    Type 2 diabetes mellitus with microalbuminuria, without long-term current use of insulin (HCC)  -     Hemoglobin A1C    Essential hypertension  -     CBC and differential  -     Comprehensive metabolic panel  -     metoprolol succinate (TOPROL-XL) 50 mg 24 hr tablet; Take 1 tablet (50 mg total) by mouth daily for 90 days    Mixed hyperlipidemia  -     Lipid panel  -     TSH, 3rd generation with Free T4 reflex    Lacunar infarction    Meningioma (HCC)    OAB (overactive bladder)    Vitamin D deficiency  -     Vitamin D 25 hydroxy    Medicare annual wellness visit, subsequent    Obesity (BMI 30-39  9)    Situational anxiety  -     LORazepam (ATIVAN) 1 mg tablet; 1 tablet one hour before MRI  Encounter for screening mammogram for breast cancer  -     Mammo screening bilateral w cad; Future        Continue with current medications  Restart 1 fish oil capsule daily  Patient is scheduled for a follow-up MRI brain  Script for mammogram  OV in 6 months with repeat labs at that time  BMI Counseling: Body mass index is 31 93 kg/m²  Discussed the patient's BMI with her  The BMI is above average  BMI counseling and education was provided to the patient  Nutrition recommendations include reducing portion sizes, decreasing overall calorie intake, consuming healthier snacks, moderation in carbohydrate intake, reducing intake of saturated fat and trans fat and reducing intake of cholesterol  Exercise recommendations include exercising 3-5 times per week  Patient ID: Azul Najera is a 68 y o  female  Follow-up visit  Medications reviewed  Labs 02/2019  See note  Hypertension blood pressures have been stable on Amlodipine 5 mg daily and Metoprolol ER 50 mg daily  creatinine 0 73  Electrolytes normal  Hgb 15 2  Type 2 diabetes mellitus diet controlled  A1c 6 8 increased from 6 0  Urine microalbumin 21 6  Not on ACE or ARB    No neuropathy symptoms  Last eye exam > 1 year ago  Hyperlipidemia mixed type on Atorvastatin 40 mg daily  No longer fish oil capsules  Lipid profile Cholesterol 202 increased from 142  Triglycerides 191 increased from 159  HDL 42    LFTs normal  alkaline phosphatase 128 decreased from 155  TSH 2 576  The following portions of the patient's history were reviewed and updated as appropriate: allergies, current medications, past family history, past medical history, past social history, past surgical history and problem list     Review of Systems   Constitutional: Negative for appetite change, chills, fatigue, fever and unexpected weight change  HENT: Positive for hearing loss (hearing aid left ear )  Negative for congestion, ear pain, rhinorrhea, sore throat, trouble swallowing and voice change  09/2017 ENT evaluation for sudden hearing loss right ear  MRI of brain at that time showed no evidence of acoustic neuroma  Small retention cyst left maxillary sinus  1 5 cm frontal meningioma  She was seen and evaluated by neuro surgery  Eyes: Negative for visual disturbance  Respiratory: Negative for cough, shortness of breath and wheezing  Cardiovascular: Negative for chest pain, palpitations and leg swelling  ER admission 06/2017 for accelerated hypertension  07/2017 renal artery Dopplers no renal artery stenosis  07/2017 stress echocardiogram no stress-induced ischemia  No regional wall motion abnormalities  Ejection fraction 60%  Gastrointestinal: Negative for abdominal pain, blood in stool, constipation, diarrhea, nausea and vomiting  GERD stable on omeprazole 20 mg daily  No reflux  No dysphagia  Colonoscopy 12/2015   Endocrine:          07/2017 DEXA scan T-score -1 7 left hip consistent with low bone mineral density  On vitamin D supplement  Genitourinary: Positive for difficulty urinating  Overactive bladder with improvement on Ditropan at [facility]  Nocturia 1-2  Occasional stress incontinence  Musculoskeletal: Negative for arthralgias and myalgias  S/p left ankle fracture ORIF 10/2018  OA left knee X-rays 01/2018 showed moderate narrowing of the medial joint compartment and small joint effusion  S/p steroid injection left knee this month   Skin: Negative for rash  Neurological: Negative for dizziness and headaches  See ALCON MATIAS 06/2018 follow-up MRI brain for meningioma left frontal lobe meningioma stable from prior exam scattered moderate chronic microangiopathic changes  7 mm focus of vague diffusion abnormality in the deep white matter of the right frontal parietal junction may represent subacute to chronic lacunar infarct  Chronic lacunar infarct right centrum semi ovale  07/2018 CTA no acute intracranial disease  Foot printed of chronic small vessel disease  Small stable anterior left frontal fossa meningioma without mass effect  No large vessel flow restrictive disease within the head or neck  Incidental 12 mm hypodense left thyroid nodule  Patient was switched from ASA to Plavix   Hematological: Negative for adenopathy  Does not bruise/bleed easily  Psychiatric/Behavioral: Negative for dysphoric mood and sleep disturbance  Objective:      /70   Pulse 73   Temp 97 5 °F (36 4 °C)   Resp 16   Ht 4' 8" (1 422 m)   Wt 64 6 kg (142 lb 6 4 oz)   SpO2 98%   BMI 31 93 kg/m²          Physical Exam   Constitutional: She is oriented to person, place, and time  She appears well-developed and well-nourished  No distress  HENT:   Mouth/Throat: Oropharynx is clear and moist and mucous membranes are normal  No oral lesions  Normal dentition  Eyes: Pupils are equal, round, and reactive to light  Conjunctivae and EOM are normal  No scleral icterus  Neck: No JVD present  Carotid bruit is not present  No tracheal deviation present  No thyroid mass and no thyromegaly present     Cardiovascular: Normal rate, regular rhythm and normal heart sounds  Exam reveals no gallop  No murmur heard  Pulmonary/Chest: Effort normal and breath sounds normal  No respiratory distress  She has no wheezes  She has no rales  Abdominal: Soft  Bowel sounds are normal  She exhibits no distension, no abdominal bruit and no mass  There is no tenderness  There is no rebound and no guarding  Musculoskeletal: She exhibits no edema  Lymphadenopathy:     She has no cervical adenopathy  Neurological: She is alert and oriented to person, place, and time  No cranial nerve deficit  Skin: No rash noted  Psychiatric: She has a normal mood and affect  Cognition and memory are normal    Nursing note and vitals reviewed            Recent Results (from the past 672 hour(s))   CBC and differential    Collection Time: 02/20/19  7:12 AM   Result Value Ref Range    WBC 7 39 4 31 - 10 16 Thousand/uL    RBC 5 10 3 81 - 5 12 Million/uL    Hemoglobin 15 2 11 5 - 15 4 g/dL    Hematocrit 45 4 34 8 - 46 1 %    MCV 89 82 - 98 fL    MCH 29 8 26 8 - 34 3 pg    MCHC 33 5 31 4 - 37 4 g/dL    RDW 13 2 11 6 - 15 1 %    MPV 11 4 8 9 - 12 7 fL    Platelets 100 181 - 636 Thousands/uL    nRBC 0 /100 WBCs    Neutrophils Relative 46 43 - 75 %    Immat GRANS % 0 0 - 2 %    Lymphocytes Relative 43 14 - 44 %    Monocytes Relative 7 4 - 12 %    Eosinophils Relative 3 0 - 6 %    Basophils Relative 1 0 - 1 %    Neutrophils Absolute 3 38 1 85 - 7 62 Thousands/µL    Immature Grans Absolute 0 03 0 00 - 0 20 Thousand/uL    Lymphocytes Absolute 3 20 0 60 - 4 47 Thousands/µL    Monocytes Absolute 0 53 0 17 - 1 22 Thousand/µL    Eosinophils Absolute 0 20 0 00 - 0 61 Thousand/µL    Basophils Absolute 0 05 0 00 - 0 10 Thousands/µL   Comprehensive metabolic panel    Collection Time: 02/20/19  7:12 AM   Result Value Ref Range    Sodium 138 136 - 145 mmol/L    Potassium 3 7 3 5 - 5 3 mmol/L    Chloride 102 100 - 108 mmol/L    CO2 23 21 - 32 mmol/L    ANION GAP 13 4 - 13 mmol/L    BUN 17 5 - 25 mg/dL Creatinine 0 73 0 60 - 1 30 mg/dL    Glucose, Fasting 153 (H) 65 - 99 mg/dL    Calcium 9 5 8 3 - 10 1 mg/dL    AST 39 5 - 45 U/L    ALT 47 12 - 78 U/L    Alkaline Phosphatase 128 (H) 46 - 116 U/L    Total Protein 7 8 6 4 - 8 2 g/dL    Albumin 4 0 3 5 - 5 0 g/dL    Total Bilirubin 0 90 0 20 - 1 00 mg/dL    eGFR 82 ml/min/1 73sq m   Lipid panel    Collection Time: 02/20/19  7:12 AM   Result Value Ref Range    Cholesterol 202 (H) 50 - 200 mg/dL    Triglycerides 191 (H) <=150 mg/dL    HDL, Direct 42 40 - 60 mg/dL    LDL Calculated 122 (H) 0 - 100 mg/dL    Non-HDL-Chol (CHOL-HDL) 160 mg/dl   TSH, 3rd generation with Free T4 reflex    Collection Time: 02/20/19  7:12 AM   Result Value Ref Range    TSH 3RD GENERATON 2 576 0 358 - 3 740 uIU/mL   Hemoglobin A1C    Collection Time: 02/20/19  7:12 AM   Result Value Ref Range    Hemoglobin A1C 6 8 (H) 4 2 - 6 3 %     mg/dl   Microalbumin / creatinine urine ratio    Collection Time: 02/20/19  7:12 AM   Result Value Ref Range    Creatinine, Ur 95 9 mg/dL    Microalbum  ,U,Random 21 6 (H) 0 0 - 20 0 mg/L    Microalb Creat Ratio 23 0 - 30 mg/g creatinine

## 2019-03-13 ENCOUNTER — HOSPITAL ENCOUNTER (OUTPATIENT)
Dept: MRI IMAGING | Facility: HOSPITAL | Age: 74
Discharge: HOME | End: 2019-03-13
Payer: COMMERCIAL

## 2019-03-13 DIAGNOSIS — R90.0 INTRACRANIAL MASS: ICD-10-CM

## 2019-03-13 DIAGNOSIS — D32.9 MENINGIOMA (HCC): ICD-10-CM

## 2019-03-13 PROCEDURE — A9585 GADOBUTROL INJECTION: HCPCS | Performed by: PHYSICIAN ASSISTANT

## 2019-03-13 PROCEDURE — 70553 MRI BRAIN STEM W/O & W/DYE: CPT

## 2019-03-13 RX ADMIN — GADOBUTROL 6 ML: 604.72 INJECTION INTRAVENOUS at 07:36

## 2019-03-18 ENCOUNTER — TRANSCRIBE ORDERS (OUTPATIENT)
Dept: NEUROSURGERY | Facility: CLINIC | Age: 74
End: 2019-03-18

## 2019-03-18 ENCOUNTER — OFFICE VISIT (OUTPATIENT)
Dept: NEUROSURGERY | Facility: CLINIC | Age: 74
End: 2019-03-18
Payer: COMMERCIAL

## 2019-03-18 VITALS
BODY MASS INDEX: 31.93 KG/M2 | DIASTOLIC BLOOD PRESSURE: 90 MMHG | HEART RATE: 74 BPM | HEIGHT: 56 IN | TEMPERATURE: 99.1 F | SYSTOLIC BLOOD PRESSURE: 135 MMHG

## 2019-03-18 DIAGNOSIS — D32.9 MENINGIOMA (HCC): Primary | ICD-10-CM

## 2019-03-18 DIAGNOSIS — I63.81 LACUNAR INFARCTION (HCC): ICD-10-CM

## 2019-03-18 DIAGNOSIS — Z01.818 PRE-PROCEDURAL EXAMINATION: Primary | ICD-10-CM

## 2019-03-18 DIAGNOSIS — H90.3 SENSORINEURAL HEARING LOSS (SNHL), BILATERAL: ICD-10-CM

## 2019-03-18 PROCEDURE — 99214 OFFICE O/P EST MOD 30 MIN: CPT | Performed by: PHYSICIAN ASSISTANT

## 2019-03-18 NOTE — PROGRESS NOTES
Assessment/Plan:     Diagnoses and all orders for this visit:    Meningioma ([address] )  -     MRI brain with and without contrast; Future    Lacunar infarction    Sensorineural hearing loss (SNHL), bilateral        Patient was seen and examined  MRI brain 03/13/2019 shows presence of left frontal parasagittal meningioma that is grossly stable compared to previous exam   Approximately 1 3 x 0 7 cm in size  Mild mass effect without vasogenic edema  Imaging was reviewed with patient  Patient has routinely been following up approximately every 6-8 months and is scheduled to see the neurologist shortly for follow up  Based on multiple repeat stable images, will continue to follow-up with patient in approximately 12 months with repeat MRI brain with and without contrast  No current surgical intervention recommended  Continue with active and healthy lifestyle  Patient encouraged to call with questions or concerns or changes/development of new symptoms  Patient expressed understanding and had no further questions at the time  Subjective:        Patient ID: Preeti Loaiza is a 68 y o  female  Patient presented for ongoing follow-up for incidental finding of small left frontal meningioma seen on MRI during workup for acute onset of right ear hearing loss  Last office visit was July 2018  Today during her office issues she has no complaints at this time  She states that she has been doing quite well recently of no last  October patient underwent open reduction internal fixation of left lateral ankle fracture that she sustained while walking and caring a pumpkin  She has been recovering well and states she has more energy than she used to  She walks 3 times a week and has been staying active  She states she continues to follow with Neurology for management of risk stratification secondary to lacunar strokes  Since last visit patient has developed no new symptoms or complaints    She denies any visual changes, headaches, changes in hearing, chest pain, shortness breath, abdominal pain, bowel or bladder incontinence, numbness or tingling in any of her arms extremities  She denies any at personality changes, confusion, impulsiveness  The following portions of the patient's history were reviewed and updated as appropriate: allergies, current medications, past family history, past medical history, past social history, past surgical history and problem list     Review of Systems   Constitutional: Negative for activity change and fatigue  HENT: Positive for hearing loss (right ear)  Negative for congestion and trouble swallowing  Eyes: Negative for visual disturbance  Respiratory: Negative for cough, chest tightness and shortness of breath  Cardiovascular: Negative for chest pain  Gastrointestinal: Negative for abdominal pain  Genitourinary: Negative for difficulty urinating  Musculoskeletal: Negative for arthralgias, back pain, gait problem, joint swelling and neck pain  Skin: Negative for rash and wound  Allergic/Immunologic: Negative for immunocompromised state  Neurological: Negative for dizziness, tremors, weakness, light-headedness and numbness  Hematological: Negative for adenopathy  Psychiatric/Behavioral: Negative for agitation, behavioral problems and confusion  Objective:      /90 (BP Location: Left arm, Patient Position: Sitting, Cuff Size: Standard)   Pulse 74   Temp 99 1 °F (37 3 °C) (Temporal)   Ht 4' 8" (1 422 m)   BMI 31 93 kg/m²          Physical Exam   Constitutional: She is oriented to person, place, and time  Vital signs are normal  She appears well-developed and well-nourished  Non-toxic appearance  She does not have a sickly appearance  HENT:   Head: Normocephalic and atraumatic  Head is without abrasion, without contusion and without laceration  Right Ear: Decreased hearing is noted     Left Ear: Hearing normal    Nose: Nose normal    Eyes: Pupils are equal, round, and reactive to light  Conjunctivae, EOM and lids are normal  Right eye exhibits no discharge  Left eye exhibits no discharge  Neck: Trachea normal and full passive range of motion without pain  Cardiovascular: Normal rate  Pulmonary/Chest: Effort normal  No apnea  No respiratory distress  Abdominal: Soft  Normal appearance  Musculoskeletal: Normal range of motion  Healing scar on lateral left foot  Neurological: She is alert and oriented to person, place, and time  She has normal strength and normal reflexes  No cranial nerve deficit or sensory deficit  Coordination and gait normal  GCS eye subscore is 4  GCS verbal subscore is 5  GCS motor subscore is 6  Reflex Scores:       Bicep reflexes are 2+ on the right side and 2+ on the left side  Brachioradialis reflexes are 2+ on the right side and 2+ on the left side  Patellar reflexes are 2+ on the right side and 2+ on the left side  Achilles reflexes are 2+ on the right side and 2+ on the left side  Skin: Skin is warm and dry  Psychiatric: She has a normal mood and affect   Her speech is normal and behavior is normal  Thought content normal

## 2019-03-19 ENCOUNTER — HOSPITAL ENCOUNTER (OUTPATIENT)
Dept: RADIOLOGY | Facility: MEDICAL CENTER | Age: 74
Discharge: HOME | End: 2019-03-19
Payer: COMMERCIAL

## 2019-03-19 VITALS — WEIGHT: 142 LBS | HEIGHT: 56 IN | BODY MASS INDEX: 31.94 KG/M2

## 2019-03-19 DIAGNOSIS — Z12.31 ENCOUNTER FOR SCREENING MAMMOGRAM FOR BREAST CANCER: ICD-10-CM

## 2019-03-19 PROCEDURE — 77067 SCR MAMMO BI INCL CAD: CPT

## 2019-03-20 ENCOUNTER — TELEPHONE (OUTPATIENT)
Dept: FAMILY MEDICINE CLINIC | Facility: CLINIC | Age: 74
End: 2019-03-20

## 2019-03-20 NOTE — TELEPHONE ENCOUNTER
Patient woke up with a stuffy head/nose & sore throat and she is wondering what she can take OTC with her meds? Please advise      Hortencia Lawrence [phone]

## 2019-06-19 ENCOUNTER — OFFICE VISIT (OUTPATIENT)
Dept: FAMILY MEDICINE CLINIC | Facility: CLINIC | Age: 74
End: 2019-06-19
Payer: COMMERCIAL

## 2019-06-19 VITALS
OXYGEN SATURATION: 98 % | SYSTOLIC BLOOD PRESSURE: 142 MMHG | BODY MASS INDEX: 31.61 KG/M2 | DIASTOLIC BLOOD PRESSURE: 80 MMHG | HEIGHT: 57 IN | WEIGHT: 146.5 LBS | HEART RATE: 78 BPM | TEMPERATURE: 97.7 F | RESPIRATION RATE: 16 BRPM

## 2019-06-19 DIAGNOSIS — J06.9 ACUTE URI: Primary | ICD-10-CM

## 2019-06-19 PROCEDURE — 1036F TOBACCO NON-USER: CPT | Performed by: FAMILY MEDICINE

## 2019-06-19 PROCEDURE — 3008F BODY MASS INDEX DOCD: CPT | Performed by: FAMILY MEDICINE

## 2019-06-19 PROCEDURE — 99213 OFFICE O/P EST LOW 20 MIN: CPT | Performed by: FAMILY MEDICINE

## 2019-07-04 DIAGNOSIS — I63.81 LACUNAR INFARCTION (HCC): ICD-10-CM

## 2019-07-04 RX ORDER — CLOPIDOGREL BISULFATE 75 MG/1
TABLET ORAL
Qty: 90 TABLET | Refills: 1 | Status: SHIPPED | OUTPATIENT
Start: 2019-07-04 | End: 2019-12-26 | Stop reason: SDUPTHER

## 2019-07-08 ENCOUNTER — TELEPHONE (OUTPATIENT)
Dept: NEUROSURGERY | Facility: CLINIC | Age: 74
End: 2019-07-08

## 2019-07-08 DIAGNOSIS — N32.81 OAB (OVERACTIVE BLADDER): ICD-10-CM

## 2019-07-08 RX ORDER — OXYBUTYNIN CHLORIDE 5 MG/1
5 TABLET, EXTENDED RELEASE ORAL DAILY
Qty: 30 TABLET | Refills: 0 | Status: SHIPPED | OUTPATIENT
Start: 2019-07-08 | End: 2019-08-06 | Stop reason: SDUPTHER

## 2019-07-08 NOTE — TELEPHONE ENCOUNTER
Pt called inquiring about a Plavix refill  Informed her Neurology is managing that medication but it also looks like it was ordered 7/4  Suggested she contact her pharmacy to inquire  She was agreeable

## 2019-07-29 ENCOUNTER — OFFICE VISIT (OUTPATIENT)
Dept: NEUROLOGY | Facility: CLINIC | Age: 74
End: 2019-07-29
Payer: COMMERCIAL

## 2019-07-29 VITALS
SYSTOLIC BLOOD PRESSURE: 140 MMHG | DIASTOLIC BLOOD PRESSURE: 70 MMHG | RESPIRATION RATE: 16 BRPM | WEIGHT: 141 LBS | BODY MASS INDEX: 30.42 KG/M2 | HEART RATE: 69 BPM | HEIGHT: 57 IN

## 2019-07-29 DIAGNOSIS — I10 ESSENTIAL HYPERTENSION: Primary | ICD-10-CM

## 2019-07-29 DIAGNOSIS — D32.9 MENINGIOMA (HCC): ICD-10-CM

## 2019-07-29 DIAGNOSIS — I63.81 LACUNAR INFARCTION (HCC): ICD-10-CM

## 2019-07-29 PROCEDURE — 99214 OFFICE O/P EST MOD 30 MIN: CPT | Performed by: PSYCHIATRY & NEUROLOGY

## 2019-07-29 NOTE — PROGRESS NOTES
Patient ID: Elmer Mora is a 68 y o  female  Assessment/Plan:    69 y/o Female who is here as a follow up of subacute infarct in the semi-centrum ovale infarct  She does have a 1 5cm frontal meningioma as well and she follows up with neurosurgery  Etiology of the stroke was thought to be small vessel disease  She does have vascular RF such as HTN, Diabetes  She is not smoker  -PLAN:  Medications -   -c/w with combination of plavix and lipitor for secondary stroke prevention    Stroke risk factor modifications -   -Continue with BP goal < 130/80, BP today is at goal currently    -Continue to monitor DM and appropriate Euglycemic control  -Defer to PCP regarding DM and BP management  -recommend PCP check LDL levels, goal LDL <70  -does not smoke  -denies any history of snoring  Therapy -   -no therapy     Counseling -   -I advised patient to avoid using NSAIDs for headaches or other pain and instead just stick to tylenol    -I educated regarding mediterranean style diet and regular exercise regimen of brisk walking atleast 4-5 times a week  Literature given  -I educated patient and family regarding medication compliance and stroke risk factor modifications  Left frontal meningioma  -follows up with neurosurgery, and sees them in March 2020  I would like to follow up in 1 year  I would be happy to see the patient sooner if any new questions/concerns arise  Patient/Guardian was advised to the call the office if they have any questions and concerns in the meantime  Patient/Guardian does understand that if they have any new stroke like symptoms such as facial droop on one side, weakness/paralysis on either side, speech trouble, numbness on one side, balance issues, any vision changes, extreme dizziness or any new headache, to call 9-1-1 immediately or to proceed to the nearest ER immediately        Problem List Items Addressed This Visit        Cardiovascular and Mediastinum Essential hypertension - Primary       Nervous and Auditory    Meningioma (HCC)    Lacunar infarction [facility])             Subjective:    HPI    This is a 69 y/o Female who is here as a follow up of subacute infarct  She does have a history of L frontal meningioma  She says that she is doing well overall  She is following neurosurgery for the meningioma  She is maintained on Plavix and Lipitor for stroke prevention  She used to give nose bleeds and ENT cauterized her nose bleeds in October/november  She has not had any TIA/CVA like symptoms  She does get tired now especially with the weather being hot  She is complaint with her medications  She is driving, and is taking care of herself  The following portions of the patient's history were reviewed and updated as appropriate:   She  has a past medical history of Endometriosis, GERD (gastroesophageal reflux disease), Hyperlipidemia, Hypertension, IFG (impaired fasting glucose), Stroke ([address] ) (06/2018), and Sudden-onset sensorineural hearing loss (12/6/2017)  She   Patient Active Problem List    Diagnosis Date Noted    Sensorineural hearing loss (SNHL), bilateral 02/27/2019    Lacunar infarction ([facility] ) 07/17/2018    Primary osteoarthritis of left knee 01/11/2018    Meningioma ([facility] [address] [facility] ) 12/06/2017    Rosacea 09/12/2017    Low bone mass 08/23/2017    Vitamin D deficiency 08/23/2017    Diabetes mellitus, type 2 ([address] ) 06/16/2017    Essential hypertension     Mixed hyperlipidemia     Gastroesophageal reflux disease     OAB (overactive bladder) 05/29/2016     She  has a past surgical history that includes Hysterectomy; Bunionectomy (Right, 2010); Knee cartilage surgery (Right); Colonoscopy; Knee arthroscopy (Right); and pr open tx distal fibular fracture lat malleolus (Left, 10/18/2018)  Her family history includes Cancer in her family;  Cancer (age of onset: 47) in her brother; Coronary artery disease in her sister; Diabetes in her brother; Early death in her mother; Heart attack in her mother and sister; Heart disease in her sister  She  reports that she has never smoked  She has never used smokeless tobacco  She reports that she does not drink alcohol or use drugs  Current Outpatient Medications   Medication Sig Dispense Refill    acetaminophen (TYLENOL) 500 mg tablet Take 500 mg by mouth every 6 (six) hours as needed for mild pain      amLODIPine (NORVASC) 5 mg tablet Take 5 mg by mouth daily      atorvastatin (LIPITOR) 40 mg tablet Take 40 mg by mouth daily      cholecalciferol (VITAMIN D3) 1,000 units tablet Take 2,000 Units by mouth daily      clopidogrel (PLAVIX) 75 mg tablet take 1 tablet by mouth once daily 90 tablet 1    LORazepam (ATIVAN) 1 mg tablet 1 tablet one hour before MRI  2 tablet 0    metoprolol succinate (TOPROL-XL) 50 mg 24 hr tablet Take 1 tablet (50 mg total) by mouth daily for 90 days 90 tablet 3    Multiple Vitamin (MULTIVITAMIN) tablet Take 1 tablet by mouth daily      oxybutynin (DITROPAN-XL) 5 mg 24 hr tablet Take 1 tablet (5 mg total) by mouth daily 30 tablet 0    pantoprazole (PROTONIX) 40 mg tablet Take 1 tablet (40 mg total) by mouth daily 90 tablet 3     No current facility-administered medications for this visit  Current Outpatient Medications on File Prior to Visit   Medication Sig    acetaminophen (TYLENOL) 500 mg tablet Take 500 mg by mouth every 6 (six) hours as needed for mild pain    amLODIPine (NORVASC) 5 mg tablet Take 5 mg by mouth daily    atorvastatin (LIPITOR) 40 mg tablet Take 40 mg by mouth daily    cholecalciferol (VITAMIN D3) 1,000 units tablet Take 2,000 Units by mouth daily    clopidogrel (PLAVIX) 75 mg tablet take 1 tablet by mouth once daily    LORazepam (ATIVAN) 1 mg tablet 1 tablet one hour before MRI      metoprolol succinate (TOPROL-XL) 50 mg 24 hr tablet Take 1 tablet (50 mg total) by mouth daily for 90 days    Multiple Vitamin (MULTIVITAMIN) tablet Take 1 tablet by mouth daily    oxybutynin (DITROPAN-XL) 5 mg 24 hr tablet Take 1 tablet (5 mg total) by mouth daily    pantoprazole (PROTONIX) 40 mg tablet Take 1 tablet (40 mg total) by mouth daily     No current facility-administered medications on file prior to visit  She is allergic to oxycodone and penicillins            Objective:    Blood pressure 140/70, pulse 69, resp  rate 16, height 4' 9" (1 448 m), weight 64 kg (141 lb)  Physical Exam  General - Patient is alert, awake, follows commands  Speech - no dysarthria noted, no aphasia noted  Neuro:   Cranial nerves: PERRL, EOMI, no facial droop noted, facial sensation intact, tongue midline  Motor: 5/5 throughout  Sensory - intact to soft touch throughout  Reflexes - 2+ throughout  Coordination - no ataxia/dysmetria noted  Gait - normal    ROS:  I personally reviewed ROS  Review of Systems   Constitutional: Negative  Negative for appetite change and fever  HENT: Negative  Negative for hearing loss, tinnitus, trouble swallowing and voice change  Eyes: Negative  Negative for photophobia and pain  Respiratory: Negative  Negative for shortness of breath  Cardiovascular: Negative  Negative for palpitations  Gastrointestinal: Negative  Negative for nausea and vomiting  Endocrine: Negative  Negative for cold intolerance and heat intolerance  Genitourinary: Negative  Negative for dysuria, frequency and urgency  Musculoskeletal: Negative  Negative for myalgias and neck pain  Skin: Negative  Negative for rash  Neurological: Negative  Negative for dizziness, tremors, seizures, syncope, facial asymmetry, speech difficulty, weakness, light-headedness, numbness and headaches  Hematological: Negative  Does not bruise/bleed easily  Psychiatric/Behavioral: Negative  Negative for confusion, hallucinations and sleep disturbance

## 2019-08-06 DIAGNOSIS — N32.81 OAB (OVERACTIVE BLADDER): ICD-10-CM

## 2019-08-06 RX ORDER — OXYBUTYNIN CHLORIDE 5 MG/1
5 TABLET, EXTENDED RELEASE ORAL DAILY
Qty: 30 TABLET | Refills: 5 | Status: SHIPPED | OUTPATIENT
Start: 2019-08-06 | End: 2020-01-25

## 2019-08-14 ENCOUNTER — OFFICE VISIT (OUTPATIENT)
Dept: OBGYN CLINIC | Facility: CLINIC | Age: 74
End: 2019-08-14
Payer: COMMERCIAL

## 2019-08-14 VITALS
SYSTOLIC BLOOD PRESSURE: 149 MMHG | HEART RATE: 68 BPM | WEIGHT: 138 LBS | HEIGHT: 57 IN | DIASTOLIC BLOOD PRESSURE: 81 MMHG | BODY MASS INDEX: 29.77 KG/M2

## 2019-08-14 DIAGNOSIS — M17.12 PRIMARY OSTEOARTHRITIS OF LEFT KNEE: Primary | ICD-10-CM

## 2019-08-14 PROCEDURE — 99213 OFFICE O/P EST LOW 20 MIN: CPT | Performed by: ORTHOPAEDIC SURGERY

## 2019-08-14 NOTE — PROGRESS NOTES
Patient Name:  Cristofer Segovia  MRN:  [id number]    Assessment & Plan     Left knee DJD  1  Activities as tolerated, modification to avoid pain  2  Follow-up as needed  Discussed repeat steroid injection if pain returns  Subjective     80-year-old female returns to the office today for follow-up regarding her left knee DJD  She was last seen on 2/26/19  At that time she received a steroid injection into the left knee  She notes continued improvement after receiving this injection  Today she denies any pain  She states she walks for exercise 3 times per week without pain  She denies any significant swelling, stiffness, weakness, or instability  She is happy with her progress  No numbness or tingling  No fevers or chills  General ROS:  Negative for fever or chills  Neurological ROS:  Negative for numbness or tingling  Objective     /81   Pulse 68   Ht 4' 9" (1 448 m)   Wt 62 6 kg (138 lb)   BMI 29 86 kg/m²     Left knee:  No gross deformity  Skin intact  No erythema ecchymosis or swelling  No joint line tenderness  No effusion  Range of motion includes full extension and flexion beyond 120°  Stable to varus and valgus stress  Sensation intact left lower extremity  Skin warm and well perfused  Appropriate mood and affect        Social History     Tobacco Use    Smoking status: Never Smoker    Smokeless tobacco: Never Used   Substance Use Topics    Alcohol use: No    Drug use: No       Scribe Attestation    I,:   Filiberto Mckeon PA-C am acting as a scribe while in the presence of the attending physician :        I,:   Kriss Wasserman MD personally performed the services described in this documentation    as scribed in my presence :

## 2019-08-20 DIAGNOSIS — I10 ESSENTIAL HYPERTENSION: Primary | ICD-10-CM

## 2019-08-20 RX ORDER — AMLODIPINE BESYLATE 5 MG/1
TABLET ORAL
Qty: 90 TABLET | Refills: 3 | Status: SHIPPED | OUTPATIENT
Start: 2019-08-20 | End: 2020-08-12 | Stop reason: SDUPTHER

## 2019-10-08 ENCOUNTER — TRANSCRIBE ORDERS (OUTPATIENT)
Dept: LAB | Facility: CLINIC | Age: 74
End: 2019-10-08

## 2019-10-08 ENCOUNTER — APPOINTMENT (OUTPATIENT)
Dept: LAB | Facility: CLINIC | Age: 74
End: 2019-10-08
Payer: COMMERCIAL

## 2019-10-08 LAB
25(OH)D3 SERPL-MCNC: 45.6 NG/ML (ref 30–100)
ALBUMIN SERPL BCP-MCNC: 4 G/DL (ref 3.5–5)
ALP SERPL-CCNC: 119 U/L (ref 46–116)
ALT SERPL W P-5'-P-CCNC: 47 U/L (ref 12–78)
ANION GAP SERPL CALCULATED.3IONS-SCNC: 7 MMOL/L (ref 4–13)
AST SERPL W P-5'-P-CCNC: 31 U/L (ref 5–45)
BASOPHILS # BLD AUTO: 0.06 THOUSANDS/ΜL (ref 0–0.1)
BASOPHILS NFR BLD AUTO: 1 % (ref 0–1)
BILIRUB SERPL-MCNC: 0.67 MG/DL (ref 0.2–1)
BUN SERPL-MCNC: 16 MG/DL (ref 5–25)
CALCIUM SERPL-MCNC: 9.3 MG/DL (ref 8.3–10.1)
CHLORIDE SERPL-SCNC: 108 MMOL/L (ref 100–108)
CHOLEST SERPL-MCNC: 188 MG/DL (ref 50–200)
CO2 SERPL-SCNC: 26 MMOL/L (ref 21–32)
CREAT SERPL-MCNC: 0.7 MG/DL (ref 0.6–1.3)
EOSINOPHIL # BLD AUTO: 0.26 THOUSAND/ΜL (ref 0–0.61)
EOSINOPHIL NFR BLD AUTO: 4 % (ref 0–6)
ERYTHROCYTE [DISTWIDTH] IN BLOOD BY AUTOMATED COUNT: 14 % (ref 11.6–15.1)
EST. AVERAGE GLUCOSE BLD GHB EST-MCNC: 134 MG/DL
GFR SERPL CREATININE-BSD FRML MDRD: 86 ML/MIN/1.73SQ M
GLUCOSE P FAST SERPL-MCNC: 132 MG/DL (ref 65–99)
HBA1C MFR BLD: 6.3 % (ref 4.2–6.3)
HCT VFR BLD AUTO: 46.4 % (ref 34.8–46.1)
HDLC SERPL-MCNC: 48 MG/DL (ref 40–60)
HGB BLD-MCNC: 14.7 G/DL (ref 11.5–15.4)
IMM GRANULOCYTES # BLD AUTO: 0.03 THOUSAND/UL (ref 0–0.2)
IMM GRANULOCYTES NFR BLD AUTO: 0 % (ref 0–2)
LDLC SERPL CALC-MCNC: 104 MG/DL (ref 0–100)
LYMPHOCYTES # BLD AUTO: 3.25 THOUSANDS/ΜL (ref 0.6–4.47)
LYMPHOCYTES NFR BLD AUTO: 45 % (ref 14–44)
MCH RBC QN AUTO: 29.6 PG (ref 26.8–34.3)
MCHC RBC AUTO-ENTMCNC: 31.7 G/DL (ref 31.4–37.4)
MCV RBC AUTO: 93 FL (ref 82–98)
MONOCYTES # BLD AUTO: 0.51 THOUSAND/ΜL (ref 0.17–1.22)
MONOCYTES NFR BLD AUTO: 7 % (ref 4–12)
NEUTROPHILS # BLD AUTO: 3.09 THOUSANDS/ΜL (ref 1.85–7.62)
NEUTS SEG NFR BLD AUTO: 43 % (ref 43–75)
NONHDLC SERPL-MCNC: 140 MG/DL
NRBC BLD AUTO-RTO: 0 /100 WBCS
PLATELET # BLD AUTO: 187 THOUSANDS/UL (ref 149–390)
PMV BLD AUTO: 12.5 FL (ref 8.9–12.7)
POTASSIUM SERPL-SCNC: 3.7 MMOL/L (ref 3.5–5.3)
PROT SERPL-MCNC: 7.6 G/DL (ref 6.4–8.2)
RBC # BLD AUTO: 4.97 MILLION/UL (ref 3.81–5.12)
SODIUM SERPL-SCNC: 141 MMOL/L (ref 136–145)
TRIGL SERPL-MCNC: 179 MG/DL
TSH SERPL DL<=0.05 MIU/L-ACNC: 2.36 UIU/ML (ref 0.36–3.74)
WBC # BLD AUTO: 7.2 THOUSAND/UL (ref 4.31–10.16)

## 2019-10-08 PROCEDURE — 84443 ASSAY THYROID STIM HORMONE: CPT | Performed by: FAMILY MEDICINE

## 2019-10-08 PROCEDURE — 80061 LIPID PANEL: CPT | Performed by: FAMILY MEDICINE

## 2019-10-08 PROCEDURE — 82306 VITAMIN D 25 HYDROXY: CPT | Performed by: FAMILY MEDICINE

## 2019-10-08 PROCEDURE — 80053 COMPREHEN METABOLIC PANEL: CPT | Performed by: FAMILY MEDICINE

## 2019-10-08 PROCEDURE — 36415 COLL VENOUS BLD VENIPUNCTURE: CPT | Performed by: FAMILY MEDICINE

## 2019-10-08 PROCEDURE — 85025 COMPLETE CBC W/AUTO DIFF WBC: CPT | Performed by: FAMILY MEDICINE

## 2019-10-08 PROCEDURE — 83036 HEMOGLOBIN GLYCOSYLATED A1C: CPT | Performed by: FAMILY MEDICINE

## 2019-10-10 ENCOUNTER — OFFICE VISIT (OUTPATIENT)
Dept: FAMILY MEDICINE CLINIC | Facility: CLINIC | Age: 74
End: 2019-10-10
Payer: COMMERCIAL

## 2019-10-10 VITALS
RESPIRATION RATE: 16 BRPM | BODY MASS INDEX: 30.85 KG/M2 | SYSTOLIC BLOOD PRESSURE: 128 MMHG | TEMPERATURE: 98.8 F | HEIGHT: 57 IN | WEIGHT: 143 LBS | DIASTOLIC BLOOD PRESSURE: 72 MMHG | HEART RATE: 64 BPM

## 2019-10-10 DIAGNOSIS — D32.9 MENINGIOMA (HCC): ICD-10-CM

## 2019-10-10 DIAGNOSIS — E11.29 TYPE 2 DIABETES MELLITUS WITH MICROALBUMINURIA, WITHOUT LONG-TERM CURRENT USE OF INSULIN (HCC): Primary | ICD-10-CM

## 2019-10-10 DIAGNOSIS — R80.9 TYPE 2 DIABETES MELLITUS WITH MICROALBUMINURIA, WITHOUT LONG-TERM CURRENT USE OF INSULIN (HCC): Primary | ICD-10-CM

## 2019-10-10 DIAGNOSIS — I10 ESSENTIAL HYPERTENSION: ICD-10-CM

## 2019-10-10 DIAGNOSIS — E78.2 MIXED HYPERLIPIDEMIA: ICD-10-CM

## 2019-10-10 DIAGNOSIS — Z23 NEED FOR INFLUENZA VACCINATION: ICD-10-CM

## 2019-10-10 DIAGNOSIS — I63.81 LACUNAR INFARCTION (HCC): ICD-10-CM

## 2019-10-10 PROCEDURE — G0008 ADMIN INFLUENZA VIRUS VAC: HCPCS

## 2019-10-10 PROCEDURE — 99214 OFFICE O/P EST MOD 30 MIN: CPT | Performed by: FAMILY MEDICINE

## 2019-10-10 PROCEDURE — 3078F DIAST BP <80 MM HG: CPT | Performed by: FAMILY MEDICINE

## 2019-10-10 PROCEDURE — 90662 IIV NO PRSV INCREASED AG IM: CPT

## 2019-10-10 PROCEDURE — 3008F BODY MASS INDEX DOCD: CPT | Performed by: FAMILY MEDICINE

## 2019-10-10 PROCEDURE — 3074F SYST BP LT 130 MM HG: CPT | Performed by: FAMILY MEDICINE

## 2019-10-10 NOTE — PROGRESS NOTES
Assessment/Plan:         Diagnoses and all orders for this visit:    Type 2 diabetes mellitus with microalbuminuria, without long-term current use of insulin (HCC)  -     Hemoglobin A1C  -     Microalbumin / creatinine urine ratio    Essential hypertension  -     Comprehensive metabolic panel  -     CBC and differential    Mixed hyperlipidemia  -     Lipid panel    Lacunar infarction (HCC)    Meningioma (HCC)    Need for influenza vaccination  -     influenza vaccine, 1160-3863, high-dose, PF 0 5 mL (FLUZONE HIGH-DOSE)        Continue with current medications  Office visit in 6 months with repeat labs at that time  Flu vaccine today  She is due for an eye exam       Patient ID: Libertad Baptiste is a 68 y o  female  Follow-up visit  Medications reviewed  Labs 10/2019  See note  Hypertension blood pressures have been stable on Amlodipine 5 mg daily and Metoprolol ER 50 mg daily  creatinine 0 70  Electrolytes normal  Hgb 14 7  Type 2 diabetes mellitus diet controlled  A1c 6 3 decreased from 6 8  03/2019  Urine microalbumin 21 6  Not on ACE or ARB  No neuropathy symptoms  Last eye exam > 1 year ago  Hyperlipidemia mixed type on Atorvastatin 40 mg daily and one fish oil capsule/day  Lipid profile Cholesterol 188 decreased from 202  Triglycerides 179 decreased from 191  HDL 48    LFTs normal  alkaline phosphatase 119 decreased from 128  TSH 2 360      Hemoglobin A1C (%)   Date Value   10/08/2019 6 3   02/20/2019 6 8 (H)   07/19/2018 6 0   10/29/2015 6 6 (H)   04/24/2015 6 5 (H)         The following portions of the patient's history were reviewed and updated as appropriate: allergies, current medications, past family history, past medical history, past social history, past surgical history and problem list     Review of Systems   Constitutional: Negative for appetite change, chills, fatigue, fever and unexpected weight change  HENT: Positive for hearing loss (hearing aid left ear )   Negative for congestion, ear pain, rhinorrhea, sore throat, trouble swallowing and voice change  09/2017 ENT evaluation for sudden hearing loss right ear  MRI of brain at that time showed no evidence of acoustic neuroma  Small retention cyst left maxillary sinus  1 5 cm frontal meningioma  She was seen and evaluated by neuro surgery  Eyes: Negative for visual disturbance  Respiratory: Negative for cough, shortness of breath and wheezing  Cardiovascular: Negative for chest pain, palpitations and leg swelling  ER admission 06/2017 for accelerated hypertension  07/2017 renal artery Dopplers no renal artery stenosis  07/2017 stress echocardiogram no stress-induced ischemia  No regional wall motion abnormalities  Ejection fraction 60%  Gastrointestinal: Negative for abdominal pain, blood in stool, constipation, diarrhea, nausea and vomiting  GERD stable on omeprazole 20 mg daily  No reflux  No dysphagia  Colonoscopy 12/2015   Endocrine:          07/2017 DEXA scan T-score -1 7 left hip consistent with low bone mineral density  On vitamin D supplement  Genitourinary: Negative for difficulty urinating  Overactive bladder with improvement on Ditropan at [facility]  Nocturia 1-2  Occasional stress incontinence  Musculoskeletal: Negative for arthralgias and myalgias  S/p left ankle fracture ORIF 10/2018  OA left knee X-rays 01/2018 showed moderate narrowing of the medial joint compartment and small joint effusion  S/p steroid injection left knee this month   Skin: Negative for rash  Neurological: Negative for dizziness and headaches  See ALCON MATIAS   03/2019 MRI of brain left frontal meningioma minimally larger in AP dimension 7 mm previously 5 mm  Moderate chronic microangiopathy stable    No acute infarction, intracranial hemorrhage or new mass lesion   06/2018 follow-up MRI brain for meningioma left frontal lobe meningioma stable from prior exam scattered moderate chronic microangiopathic changes  7 mm focus of vague diffusion abnormality in the deep white matter of the right frontal parietal junction may represent subacute to chronic lacunar infarct  Chronic lacunar infarct right centrum semi ovale  07/2018 CTA no acute intracranial disease  Foot print of chronic small vessel disease  Small stable anterior left frontal fossa meningioma without mass effect  No large vessel flow restrictive disease within the head or neck  Incidental 12 mm hypodense left thyroid nodule  Patient was switched from ASA to Plavix   Hematological: Negative for adenopathy  Does not bruise/bleed easily  Psychiatric/Behavioral: Negative for dysphoric mood and sleep disturbance  Objective:       /72   Pulse 64   Temp 98 8 °F (37 1 °C)   Resp 16   Ht 4' 8 8" (1 443 m)   Wt 64 9 kg (143 lb)   BMI 31 16 kg/m²          Physical Exam   Constitutional: She is oriented to person, place, and time  She appears well-developed and well-nourished  No distress  HENT:   Mouth/Throat: Oropharynx is clear and moist and mucous membranes are normal  No oral lesions  Normal dentition  Eyes: Pupils are equal, round, and reactive to light  Conjunctivae and EOM are normal  No scleral icterus  Neck: No JVD present  Carotid bruit is not present  No tracheal deviation present  No thyroid mass and no thyromegaly present  Cardiovascular: Normal rate, regular rhythm and normal heart sounds  Exam reveals no gallop  Pulses are no weak pulses  No murmur heard  Pulses:       Dorsalis pedis pulses are 2+ on the right side, and 2+ on the left side  Posterior tibial pulses are 2+ on the right side, and 2+ on the left side  Pulmonary/Chest: Effort normal and breath sounds normal  No respiratory distress  She has no wheezes  She has no rales  Abdominal: Soft  Bowel sounds are normal  She exhibits no distension, no abdominal bruit and no mass  There is no hepatosplenomegaly  There is no tenderness  There is no rebound and no guarding  Musculoskeletal: She exhibits no edema  Feet:   Right Foot:   Skin Integrity: Negative for ulcer, skin breakdown, erythema, warmth, callus or dry skin  Left Foot:   Skin Integrity: Negative for ulcer, skin breakdown, erythema, warmth, callus or dry skin  Lymphadenopathy:     She has no cervical adenopathy  Neurological: She is alert and oriented to person, place, and time  No cranial nerve deficit  Skin: No rash noted  No cyanosis  Nails show no clubbing  Psychiatric: She has a normal mood and affect  Nursing note and vitals reviewed  Patient's shoes and socks removed  Right Foot/Ankle   Right Foot Inspection  Skin Exam: skin normal and skin intact no dry skin, no warmth, no callus, no erythema, no maceration, no abnormal color, no pre-ulcer, no ulcer and no callus                          Toe Exam: ROM and strength within normal limits  Sensory       Monofilament testing: intact  Vascular  Capillary refills: < 3 seconds  The right DP pulse is 2+  The right PT pulse is 2+  Left Foot/Ankle  Left Foot Inspection  Skin Exam: skin normal and skin intactno dry skin, no warmth, no erythema, no maceration, normal color, no pre-ulcer, no ulcer and no callus                         Toe Exam: ROM and strength within normal limits                   Sensory       Monofilament: intact  Vascular  Capillary refills: < 3 seconds  The left DP pulse is 2+  The left PT pulse is 2+  Assign Risk Category:  No deformity present; No loss of protective sensation; No weak pulses       Risk: 0      Recent Results (from the past 1344 hour(s))   Hemoglobin A1C    Collection Time: 10/08/19  7:11 AM   Result Value Ref Range    Hemoglobin A1C 6 3 4 2 - 6 3 %     mg/dl   CBC and differential    Collection Time: 10/08/19  7:11 AM   Result Value Ref Range    WBC 7 20 4  31 - 10 16 Thousand/uL    RBC 4 97 3 81 - 5 12 Million/uL    Hemoglobin 14 7 11 5 - 15 4 g/dL    Hematocrit 46  4 (H) 34 8 - 46 1 %    MCV 93 82 - 98 fL    MCH 29 6 26 8 - 34 3 pg    MCHC 31 7 31 4 - 37 4 g/dL    RDW 14 0 11 6 - 15 1 %    MPV 12 5 8 9 - 12 7 fL    Platelets 298 631 - 522 Thousands/uL    nRBC 0 /100 WBCs    Neutrophils Relative 43 43 - 75 %    Immat GRANS % 0 0 - 2 %    Lymphocytes Relative 45 (H) 14 - 44 %    Monocytes Relative 7 4 - 12 %    Eosinophils Relative 4 0 - 6 %    Basophils Relative 1 0 - 1 %    Neutrophils Absolute 3 09 1 85 - 7 62 Thousands/µL    Immature Grans Absolute 0 03 0 00 - 0 20 Thousand/uL    Lymphocytes Absolute 3 25 0 60 - 4 47 Thousands/µL    Monocytes Absolute 0 51 0 17 - 1 22 Thousand/µL    Eosinophils Absolute 0 26 0 00 - 0 61 Thousand/µL    Basophils Absolute 0 06 0 00 - 0 10 Thousands/µL   Comprehensive metabolic panel    Collection Time: 10/08/19  7:11 AM   Result Value Ref Range    Sodium 141 136 - 145 mmol/L    Potassium 3 7 3 5 - 5 3 mmol/L    Chloride 108 100 - 108 mmol/L    CO2 26 21 - 32 mmol/L    ANION GAP 7 4 - 13 mmol/L    BUN 16 5 - 25 mg/dL    Creatinine 0 70 0 60 - 1 30 mg/dL    Glucose, Fasting 132 (H) 65 - 99 mg/dL    Calcium 9 3 8 3 - 10 1 mg/dL    AST 31 5 - 45 U/L    ALT 47 12 - 78 U/L    Alkaline Phosphatase 119 (H) 46 - 116 U/L    Total Protein 7 6 6 4 - 8 2 g/dL    Albumin 4 0 3 5 - 5 0 g/dL    Total Bilirubin 0 67 0 20 - 1 00 mg/dL    eGFR 86 ml/min/1 73sq m   Lipid panel    Collection Time: 10/08/19  7:11 AM   Result Value Ref Range    Cholesterol 188 50 - 200 mg/dL    Triglycerides 179 (H) <=150 mg/dL    HDL, Direct 48 40 - 60 mg/dL    LDL Calculated 104 (H) 0 - 100 mg/dL    Non-HDL-Chol (CHOL-HDL) 140 mg/dl   TSH, 3rd generation with Free T4 reflex    Collection Time: 10/08/19  7:11 AM   Result Value Ref Range    TSH 3RD GENERATON 2 360 0 358 - 3 740 uIU/mL   Vitamin D 25 hydroxy    Collection Time: 10/08/19  7:11 AM   Result Value Ref Range    Vit D, 25-Hydroxy 45 6 30 0 - 100 0 ng/mL

## 2019-10-25 DIAGNOSIS — I63.81 LACUNAR INFARCTION (HCC): ICD-10-CM

## 2019-10-25 NOTE — TELEPHONE ENCOUNTER
Received a fax from pharmacy requesting a refill for pantoprazole  Patient was last seen 7/29/19 and has a follow up for 7/8/20  Last script was done for a 90 day supply with 3 refills on 11/1/18  Please authorize script if appropriate

## 2019-10-28 RX ORDER — PANTOPRAZOLE SODIUM 40 MG/1
40 TABLET, DELAYED RELEASE ORAL DAILY
Qty: 90 TABLET | Refills: 3 | Status: SHIPPED | OUTPATIENT
Start: 2019-10-28 | End: 2020-10-19

## 2019-12-26 DIAGNOSIS — I63.81 LACUNAR INFARCTION (HCC): ICD-10-CM

## 2019-12-26 RX ORDER — CLOPIDOGREL BISULFATE 75 MG/1
TABLET ORAL
Qty: 90 TABLET | Refills: 3 | Status: SHIPPED | OUTPATIENT
Start: 2019-12-26 | End: 2020-12-23

## 2020-01-02 ENCOUNTER — TELEPHONE (OUTPATIENT)
Dept: FAMILY MEDICINE CLINIC | Facility: CLINIC | Age: 75
End: 2020-01-02

## 2020-01-02 NOTE — TELEPHONE ENCOUNTER
Patient called  She started w/ runny nose on Monday  She is using flonase with no relief and now discharge is green  She wants to know if there is something else otc that she should be using  She has no other symptoms currently, but doesn't want it to get worse and her nose is already irritated   She is requesting we leave answer on her answering machine at home

## 2020-01-25 DIAGNOSIS — N32.81 OAB (OVERACTIVE BLADDER): ICD-10-CM

## 2020-01-25 RX ORDER — OXYBUTYNIN CHLORIDE 5 MG/1
TABLET, EXTENDED RELEASE ORAL
Qty: 30 TABLET | Refills: 5 | Status: SHIPPED | OUTPATIENT
Start: 2020-01-25 | End: 2020-04-21 | Stop reason: SDUPTHER

## 2020-02-12 DIAGNOSIS — E78.2 MIXED HYPERLIPIDEMIA: Primary | ICD-10-CM

## 2020-02-12 RX ORDER — ATORVASTATIN CALCIUM 40 MG/1
TABLET, FILM COATED ORAL
Qty: 90 TABLET | Refills: 3 | Status: SHIPPED | OUTPATIENT
Start: 2020-02-12 | End: 2021-02-02

## 2020-02-19 DIAGNOSIS — I10 ESSENTIAL HYPERTENSION: ICD-10-CM

## 2020-02-19 RX ORDER — METOPROLOL SUCCINATE 50 MG/1
TABLET, EXTENDED RELEASE ORAL
Qty: 90 TABLET | Refills: 3 | Status: SHIPPED | OUTPATIENT
Start: 2020-02-19 | End: 2020-04-21 | Stop reason: SDUPTHER

## 2020-03-11 ENCOUNTER — APPOINTMENT (OUTPATIENT)
Dept: LAB | Facility: CLINIC | Age: 75
End: 2020-03-11
Payer: COMMERCIAL

## 2020-03-11 DIAGNOSIS — Z01.818 PRE-PROCEDURAL EXAMINATION: ICD-10-CM

## 2020-03-11 LAB
BUN SERPL-MCNC: 15 MG/DL (ref 5–25)
CREAT SERPL-MCNC: 0.66 MG/DL (ref 0.6–1.3)
GFR SERPL CREATININE-BSD FRML MDRD: 87 ML/MIN/1.73SQ M

## 2020-03-11 PROCEDURE — 82565 ASSAY OF CREATININE: CPT

## 2020-03-11 PROCEDURE — 36415 COLL VENOUS BLD VENIPUNCTURE: CPT

## 2020-03-11 PROCEDURE — 84520 ASSAY OF UREA NITROGEN: CPT

## 2020-03-13 ENCOUNTER — TELEPHONE (OUTPATIENT)
Dept: NEUROSURGERY | Facility: CLINIC | Age: 75
End: 2020-03-13

## 2020-03-13 NOTE — TELEPHONE ENCOUNTER
Pt reports she has an MRI next week and a f/u here, and wonders if she should continue  Explained the decision is hers but as of now the office is not cancelling office appts n She stated an understanding and will continue as planned

## 2020-04-13 ENCOUNTER — TRANSCRIBE ORDERS (OUTPATIENT)
Dept: NEUROSURGERY | Facility: CLINIC | Age: 75
End: 2020-04-13

## 2020-04-13 DIAGNOSIS — I10 ESSENTIAL HYPERTENSION: Primary | ICD-10-CM

## 2020-04-13 DIAGNOSIS — Z01.818 PRE-PROCEDURAL EXAMINATION: ICD-10-CM

## 2020-04-21 ENCOUNTER — TELEPHONE (OUTPATIENT)
Dept: FAMILY MEDICINE CLINIC | Facility: CLINIC | Age: 75
End: 2020-04-21

## 2020-04-21 ENCOUNTER — TELEMEDICINE (OUTPATIENT)
Dept: FAMILY MEDICINE CLINIC | Facility: CLINIC | Age: 75
End: 2020-04-21
Payer: COMMERCIAL

## 2020-04-21 DIAGNOSIS — E78.2 MIXED HYPERLIPIDEMIA: ICD-10-CM

## 2020-04-21 DIAGNOSIS — K21.9 GASTROESOPHAGEAL REFLUX DISEASE WITHOUT ESOPHAGITIS: ICD-10-CM

## 2020-04-21 DIAGNOSIS — I10 ESSENTIAL HYPERTENSION: ICD-10-CM

## 2020-04-21 DIAGNOSIS — R80.9 TYPE 2 DIABETES MELLITUS WITH MICROALBUMINURIA, WITHOUT LONG-TERM CURRENT USE OF INSULIN (HCC): Primary | ICD-10-CM

## 2020-04-21 DIAGNOSIS — D32.9 MENINGIOMA (HCC): ICD-10-CM

## 2020-04-21 DIAGNOSIS — I63.81 LACUNAR INFARCTION (HCC): ICD-10-CM

## 2020-04-21 DIAGNOSIS — E11.29 TYPE 2 DIABETES MELLITUS WITH MICROALBUMINURIA, WITHOUT LONG-TERM CURRENT USE OF INSULIN (HCC): Primary | ICD-10-CM

## 2020-04-21 DIAGNOSIS — N32.81 OAB (OVERACTIVE BLADDER): ICD-10-CM

## 2020-04-21 PROCEDURE — 3066F NEPHROPATHY DOC TX: CPT | Performed by: PSYCHIATRY & NEUROLOGY

## 2020-04-21 PROCEDURE — 1160F RVW MEDS BY RX/DR IN RCRD: CPT | Performed by: FAMILY MEDICINE

## 2020-04-21 PROCEDURE — 99214 OFFICE O/P EST MOD 30 MIN: CPT | Performed by: FAMILY MEDICINE

## 2020-04-21 RX ORDER — METOPROLOL SUCCINATE 50 MG/1
50 TABLET, EXTENDED RELEASE ORAL DAILY
Qty: 90 TABLET | Refills: 3 | Status: SHIPPED | OUTPATIENT
Start: 2020-04-21 | End: 2021-04-13

## 2020-04-21 RX ORDER — OXYBUTYNIN CHLORIDE 5 MG/1
5 TABLET, EXTENDED RELEASE ORAL DAILY
Qty: 90 TABLET | Refills: 3 | Status: SHIPPED | OUTPATIENT
Start: 2020-04-21 | End: 2021-07-12

## 2020-05-07 ENCOUNTER — TELEPHONE (OUTPATIENT)
Dept: FAMILY MEDICINE CLINIC | Facility: CLINIC | Age: 75
End: 2020-05-07

## 2020-05-07 DIAGNOSIS — Z12.31 SCREENING MAMMOGRAM FOR HIGH-RISK PATIENT: Primary | ICD-10-CM

## 2020-06-12 ENCOUNTER — APPOINTMENT (OUTPATIENT)
Dept: LAB | Facility: CLINIC | Age: 75
End: 2020-06-12
Payer: COMMERCIAL

## 2020-06-12 DIAGNOSIS — Z01.818 PRE-PROCEDURAL EXAMINATION: ICD-10-CM

## 2020-06-12 DIAGNOSIS — I10 ESSENTIAL HYPERTENSION: ICD-10-CM

## 2020-06-12 LAB
BUN SERPL-MCNC: 17 MG/DL (ref 5–25)
CREAT SERPL-MCNC: 0.74 MG/DL (ref 0.6–1.3)
GFR SERPL CREATININE-BSD FRML MDRD: 80 ML/MIN/1.73SQ M

## 2020-06-12 PROCEDURE — 82565 ASSAY OF CREATININE: CPT

## 2020-06-12 PROCEDURE — 36415 COLL VENOUS BLD VENIPUNCTURE: CPT

## 2020-06-12 PROCEDURE — 84520 ASSAY OF UREA NITROGEN: CPT

## 2020-06-23 ENCOUNTER — HOSPITAL ENCOUNTER (OUTPATIENT)
Dept: MRI IMAGING | Facility: HOSPITAL | Age: 75
Discharge: HOME | End: 2020-06-23
Payer: COMMERCIAL

## 2020-06-23 DIAGNOSIS — D32.9 MENINGIOMA (HCC): ICD-10-CM

## 2020-06-23 PROCEDURE — 70553 MRI BRAIN STEM W/O & W/DYE: CPT

## 2020-06-23 PROCEDURE — A9585 GADOBUTROL INJECTION: HCPCS | Performed by: PHYSICIAN ASSISTANT

## 2020-06-23 RX ADMIN — GADOBUTROL 6 ML: 604.72 INJECTION INTRAVENOUS at 13:22

## 2020-06-25 ENCOUNTER — TELEPHONE (OUTPATIENT)
Dept: NEUROSURGERY | Facility: CLINIC | Age: 75
End: 2020-06-25

## 2020-06-26 ENCOUNTER — OFFICE VISIT (OUTPATIENT)
Dept: NEUROSURGERY | Facility: CLINIC | Age: 75
End: 2020-06-26
Payer: COMMERCIAL

## 2020-06-26 VITALS
SYSTOLIC BLOOD PRESSURE: 130 MMHG | WEIGHT: 140 LBS | TEMPERATURE: 97.9 F | RESPIRATION RATE: 16 BRPM | HEIGHT: 57 IN | HEART RATE: 69 BPM | BODY MASS INDEX: 30.2 KG/M2 | DIASTOLIC BLOOD PRESSURE: 72 MMHG

## 2020-06-26 DIAGNOSIS — D32.9 MENINGIOMA (HCC): Primary | ICD-10-CM

## 2020-06-26 PROCEDURE — 3075F SYST BP GE 130 - 139MM HG: CPT | Performed by: NURSE PRACTITIONER

## 2020-06-26 PROCEDURE — 3078F DIAST BP <80 MM HG: CPT | Performed by: NURSE PRACTITIONER

## 2020-06-26 PROCEDURE — 99214 OFFICE O/P EST MOD 30 MIN: CPT | Performed by: NURSE PRACTITIONER

## 2020-06-26 PROCEDURE — 3008F BODY MASS INDEX DOCD: CPT | Performed by: NURSE PRACTITIONER

## 2020-06-26 PROCEDURE — 4040F PNEUMOC VAC/ADMIN/RCVD: CPT | Performed by: NURSE PRACTITIONER

## 2020-06-26 PROCEDURE — 3066F NEPHROPATHY DOC TX: CPT | Performed by: NURSE PRACTITIONER

## 2020-06-26 PROCEDURE — 1036F TOBACCO NON-USER: CPT | Performed by: NURSE PRACTITIONER

## 2020-06-26 PROCEDURE — 1160F RVW MEDS BY RX/DR IN RCRD: CPT | Performed by: NURSE PRACTITIONER

## 2020-06-30 ENCOUNTER — HOSPITAL ENCOUNTER (OUTPATIENT)
Dept: RADIOLOGY | Facility: MEDICAL CENTER | Age: 75
Discharge: HOME | End: 2020-06-30
Payer: COMMERCIAL

## 2020-06-30 VITALS — BODY MASS INDEX: 31.49 KG/M2 | WEIGHT: 140 LBS | HEIGHT: 56 IN

## 2020-06-30 DIAGNOSIS — Z12.31 SCREENING MAMMOGRAM FOR HIGH-RISK PATIENT: ICD-10-CM

## 2020-06-30 DIAGNOSIS — Z12.31 VISIT FOR SCREENING MAMMOGRAM: ICD-10-CM

## 2020-06-30 PROCEDURE — 77067 SCR MAMMO BI INCL CAD: CPT

## 2020-06-30 PROCEDURE — 77063 BREAST TOMOSYNTHESIS BI: CPT

## 2020-07-08 ENCOUNTER — OFFICE VISIT (OUTPATIENT)
Dept: NEUROLOGY | Facility: CLINIC | Age: 75
End: 2020-07-08
Payer: COMMERCIAL

## 2020-07-08 VITALS
DIASTOLIC BLOOD PRESSURE: 78 MMHG | HEART RATE: 72 BPM | TEMPERATURE: 98.6 F | SYSTOLIC BLOOD PRESSURE: 126 MMHG | BODY MASS INDEX: 31.76 KG/M2 | HEIGHT: 56 IN | WEIGHT: 141.2 LBS

## 2020-07-08 DIAGNOSIS — G47.00 INSOMNIA, UNSPECIFIED TYPE: ICD-10-CM

## 2020-07-08 DIAGNOSIS — D32.9 MENINGIOMA (HCC): Primary | ICD-10-CM

## 2020-07-08 DIAGNOSIS — I63.81 LACUNAR INFARCTION (HCC): ICD-10-CM

## 2020-07-08 DIAGNOSIS — I10 ESSENTIAL HYPERTENSION: ICD-10-CM

## 2020-07-08 DIAGNOSIS — H90.3 SENSORINEURAL HEARING LOSS (SNHL), BILATERAL: ICD-10-CM

## 2020-07-08 PROCEDURE — 3066F NEPHROPATHY DOC TX: CPT | Performed by: PSYCHIATRY & NEUROLOGY

## 2020-07-08 PROCEDURE — 1160F RVW MEDS BY RX/DR IN RCRD: CPT | Performed by: PSYCHIATRY & NEUROLOGY

## 2020-07-08 PROCEDURE — 3078F DIAST BP <80 MM HG: CPT | Performed by: PSYCHIATRY & NEUROLOGY

## 2020-07-08 PROCEDURE — 1036F TOBACCO NON-USER: CPT | Performed by: PSYCHIATRY & NEUROLOGY

## 2020-07-08 PROCEDURE — 99214 OFFICE O/P EST MOD 30 MIN: CPT | Performed by: PSYCHIATRY & NEUROLOGY

## 2020-07-08 PROCEDURE — 4040F PNEUMOC VAC/ADMIN/RCVD: CPT | Performed by: PSYCHIATRY & NEUROLOGY

## 2020-07-08 PROCEDURE — 3074F SYST BP LT 130 MM HG: CPT | Performed by: PSYCHIATRY & NEUROLOGY

## 2020-07-08 PROCEDURE — 3008F BODY MASS INDEX DOCD: CPT | Performed by: PSYCHIATRY & NEUROLOGY

## 2020-07-08 NOTE — PROGRESS NOTES
Patient ID: Laura Palacios is a 76 y o  female  Assessment/Plan: This is a 77 y/o  Female who is here as a follow up for history of subacute infarct in the semi-centrum ovale infarct  She does have a 1 5cm frontal meningioma as well and she follows up with neurosurgery  PLAN:      Diagnoses and all orders for this visit:    Meningioma [facility])  -patient following neurosurgery  -repeat MRI brain without contrast which I personally reviewed the images which does stable meningioma and has not changed     Lacunar infarction ([facility] )  -c/w with combination of Plavix and Lipitor for secondary stroke prevention  -BP goal < 130/80, BP is at goal    -Defer to PCP regarding BP management  -does not smoke at this time   -denies any history of snoring    -I advised patient to avoid using NSAIDs for headaches or other pain  -Recommend mediterranean diet & regular exercise regimen atleast 4-5 times a week for 20-30 minutes  -I educated patient/family regarding medication compliance    Sensorineural hearing loss (SNHL), bilateral    Essential hypertension    Insomnia  -recommend melatonin 3mg PO before bedtime      I would like to follow up in 1 year  I would be happy to see the patient sooner if any new questions/concerns arise  Patient/Guardian was advised to the call the office if they have any questions and concerns in the meantime  Patient/Guardian does understand that if they have any new stroke like symptoms such as facial droop on one side, weakness/paralysis on either side, speech trouble, numbness on one side, balance issues, any vision changes, extreme dizziness or any new headache, to call 9-1-1 immediately or to proceed to the nearest ER immediately  Subjective:    HPI    This is a 77 y/o Female who is here as a follow up for subacute infarct in the semi-centrum ovale infarct  Patient doing well overall denies any new TIA/Cerebrovascular accident like symptoms    Patient is compliant medications  Tolerating Plavix and Lipitor well without any side effects  Denies any bleeding or bruising at this time  She just under a lot of stress because her daughter is going through something she states  She has some trouble sleeping  The following portions of the patient's history were reviewed and updated as appropriate:   She  has a past medical history of Endometriosis, GERD (gastroesophageal reflux disease), Hyperlipidemia, Hypertension, IFG (impaired fasting glucose), Stroke ([facility] ) (06/2018), and Sudden-onset sensorineural hearing loss (12/6/2017)  She   Patient Active Problem List    Diagnosis Date Noted    Sensorineural hearing loss (SNHL), bilateral 02/27/2019    [facility]ar infarction ([facility] ) 07/17/2018    Primary osteoarthritis of left knee 01/11/2018    Meningioma ([place] [address] ) 12/06/2017    Rosacea 09/12/2017    Low bone mass 08/23/2017    Vitamin D deficiency 08/23/2017    Diabetes mellitus, type 2 ([place] [address] ) 06/16/2017    Essential hypertension     Mixed hyperlipidemia     Gastroesophageal reflux disease     OAB (overactive bladder) 05/29/2016     She  has a past surgical history that includes Hysterectomy; Bunionectomy (Right, 2010); Knee cartilage surgery (Right); Colonoscopy; Knee arthroscopy (Right); and pr open tx distal fibular fracture lat malleolus (Left, 10/18/2018)  Her family history includes Breast cancer in her sister; Cancer in her family; Cancer (age of onset: 47) in her brother; Coronary artery disease in her sister; Diabetes in her brother; Early death in her mother; Heart attack in her mother and sister; Heart disease in her sister; No Known Problems in her daughter, daughter, daughter, father, maternal grandfather, maternal grandmother, paternal grandfather, paternal grandmother, sister, and son  She  reports that she has never smoked  She has never used smokeless tobacco  She reports that she does not drink alcohol or use drugs    Current Outpatient Medications   Medication Sig Dispense Refill    acetaminophen (TYLENOL) 500 mg tablet Take 500 mg by mouth every 6 (six) hours as needed for mild pain      amLODIPine (NORVASC) 5 mg tablet take 1 tablet by mouth every evening 90 tablet 3    atorvastatin (LIPITOR) 40 mg tablet take 1 tablet by mouth once daily 90 tablet 3    cholecalciferol (VITAMIN D3) 1,000 units tablet Take 2,000 Units by mouth daily      clopidogrel (PLAVIX) 75 mg tablet take 1 tablet by mouth once daily 90 tablet 3    metoprolol succinate (TOPROL-XL) 50 mg 24 hr tablet Take 1 tablet (50 mg total) by mouth daily 90 tablet 3    Multiple Vitamin (MULTIVITAMIN) tablet Take 1 tablet by mouth daily      oxybutynin (DITROPAN-XL) 5 mg 24 hr tablet Take 1 tablet (5 mg total) by mouth daily 90 tablet 3    pantoprazole (PROTONIX) 40 mg tablet Take 1 tablet (40 mg total) by mouth daily 90 tablet 3     No current facility-administered medications for this visit  Current Outpatient Medications on File Prior to Visit   Medication Sig    acetaminophen (TYLENOL) 500 mg tablet Take 500 mg by mouth every 6 (six) hours as needed for mild pain    amLODIPine (NORVASC) 5 mg tablet take 1 tablet by mouth every evening    atorvastatin (LIPITOR) 40 mg tablet take 1 tablet by mouth once daily    cholecalciferol (VITAMIN D3) 1,000 units tablet Take 2,000 Units by mouth daily    clopidogrel (PLAVIX) 75 mg tablet take 1 tablet by mouth once daily    metoprolol succinate (TOPROL-XL) 50 mg 24 hr tablet Take 1 tablet (50 mg total) by mouth daily    Multiple Vitamin (MULTIVITAMIN) tablet Take 1 tablet by mouth daily    oxybutynin (DITROPAN-XL) 5 mg 24 hr tablet Take 1 tablet (5 mg total) by mouth daily    pantoprazole (PROTONIX) 40 mg tablet Take 1 tablet (40 mg total) by mouth daily     No current facility-administered medications on file prior to visit  She is allergic to oxycodone and penicillins            Objective:    Blood pressure 126/78, pulse 72, temperature 98 6 °F (37 °C), height 4' 8" (1 422 m), weight 64 kg (141 lb 3 2 oz)  Physical Exam  General - Patient is alert, awake, follows commands  Speech - no dysarthria noted, no aphasia noted  Neuro:   Cranial nerves: PERRL, EOMI, no facial droop noted, facial sensation intact, tongue midline  Motor: 5/5 throughout, normal tone  Sensory - intact to soft touch throughout  Coordination - no ataxia/dysmetria noted  Gait - normal tandem walk    ROS:  I personally reviewed ROS   Review of Systems   Constitutional: Negative  HENT: Negative  Eyes: Negative  Respiratory: Negative  Cardiovascular: Negative  Gastrointestinal: Negative  Endocrine: Negative  Genitourinary: Negative  Musculoskeletal: Positive for back pain (in morning after waking occasionally)  Skin: Negative  Allergic/Immunologic: Negative  Neurological: Negative  Psychiatric/Behavioral: Positive for sleep disturbance (to use bathroom)  The patient is nervous/anxious

## 2020-07-27 ENCOUNTER — APPOINTMENT (OUTPATIENT)
Dept: LAB | Facility: CLINIC | Age: 75
End: 2020-07-27
Payer: COMMERCIAL

## 2020-07-27 LAB
ALBUMIN SERPL BCP-MCNC: 3.8 G/DL (ref 3.5–5)
ALP SERPL-CCNC: 94 U/L (ref 46–116)
ALT SERPL W P-5'-P-CCNC: 47 U/L (ref 12–78)
ANION GAP SERPL CALCULATED.3IONS-SCNC: 7 MMOL/L (ref 4–13)
AST SERPL W P-5'-P-CCNC: 32 U/L (ref 5–45)
BASOPHILS # BLD AUTO: 0.06 THOUSANDS/ΜL (ref 0–0.1)
BASOPHILS NFR BLD AUTO: 1 % (ref 0–1)
BILIRUB SERPL-MCNC: 0.65 MG/DL (ref 0.2–1)
BUN SERPL-MCNC: 18 MG/DL (ref 5–25)
CALCIUM SERPL-MCNC: 9 MG/DL (ref 8.3–10.1)
CHLORIDE SERPL-SCNC: 107 MMOL/L (ref 100–108)
CHOLEST SERPL-MCNC: 181 MG/DL (ref 50–200)
CO2 SERPL-SCNC: 25 MMOL/L (ref 21–32)
CREAT SERPL-MCNC: 0.74 MG/DL (ref 0.6–1.3)
CREAT UR-MCNC: 74.3 MG/DL
EOSINOPHIL # BLD AUTO: 0.22 THOUSAND/ΜL (ref 0–0.61)
EOSINOPHIL NFR BLD AUTO: 3 % (ref 0–6)
ERYTHROCYTE [DISTWIDTH] IN BLOOD BY AUTOMATED COUNT: 13.7 % (ref 11.6–15.1)
EST. AVERAGE GLUCOSE BLD GHB EST-MCNC: 137 MG/DL
GFR SERPL CREATININE-BSD FRML MDRD: 80 ML/MIN/1.73SQ M
GLUCOSE P FAST SERPL-MCNC: 133 MG/DL (ref 65–99)
HBA1C MFR BLD: 6.4 %
HCT VFR BLD AUTO: 46.5 % (ref 34.8–46.1)
HDLC SERPL-MCNC: 42 MG/DL
HGB BLD-MCNC: 14.8 G/DL (ref 11.5–15.4)
IMM GRANULOCYTES # BLD AUTO: 0.02 THOUSAND/UL (ref 0–0.2)
IMM GRANULOCYTES NFR BLD AUTO: 0 % (ref 0–2)
LDLC SERPL CALC-MCNC: 106 MG/DL (ref 0–100)
LYMPHOCYTES # BLD AUTO: 3.42 THOUSANDS/ΜL (ref 0.6–4.47)
LYMPHOCYTES NFR BLD AUTO: 47 % (ref 14–44)
MCH RBC QN AUTO: 29.7 PG (ref 26.8–34.3)
MCHC RBC AUTO-ENTMCNC: 31.8 G/DL (ref 31.4–37.4)
MCV RBC AUTO: 93 FL (ref 82–98)
MICROALBUMIN UR-MCNC: 22.9 MG/L (ref 0–20)
MICROALBUMIN/CREAT 24H UR: 31 MG/G CREATININE (ref 0–30)
MONOCYTES # BLD AUTO: 0.6 THOUSAND/ΜL (ref 0.17–1.22)
MONOCYTES NFR BLD AUTO: 8 % (ref 4–12)
NEUTROPHILS # BLD AUTO: 2.95 THOUSANDS/ΜL (ref 1.85–7.62)
NEUTS SEG NFR BLD AUTO: 41 % (ref 43–75)
NONHDLC SERPL-MCNC: 139 MG/DL
NRBC BLD AUTO-RTO: 0 /100 WBCS
PLATELET # BLD AUTO: 187 THOUSANDS/UL (ref 149–390)
PMV BLD AUTO: 13 FL (ref 8.9–12.7)
POTASSIUM SERPL-SCNC: 3.7 MMOL/L (ref 3.5–5.3)
PROT SERPL-MCNC: 7.3 G/DL (ref 6.4–8.2)
RBC # BLD AUTO: 4.98 MILLION/UL (ref 3.81–5.12)
SODIUM SERPL-SCNC: 139 MMOL/L (ref 136–145)
TRIGL SERPL-MCNC: 163 MG/DL
WBC # BLD AUTO: 7.27 THOUSAND/UL (ref 4.31–10.16)

## 2020-07-27 PROCEDURE — 82043 UR ALBUMIN QUANTITATIVE: CPT | Performed by: FAMILY MEDICINE

## 2020-07-27 PROCEDURE — 3044F HG A1C LEVEL LT 7.0%: CPT | Performed by: PSYCHIATRY & NEUROLOGY

## 2020-07-27 PROCEDURE — 80061 LIPID PANEL: CPT | Performed by: FAMILY MEDICINE

## 2020-07-27 PROCEDURE — 36415 COLL VENOUS BLD VENIPUNCTURE: CPT | Performed by: FAMILY MEDICINE

## 2020-07-27 PROCEDURE — 80053 COMPREHEN METABOLIC PANEL: CPT | Performed by: FAMILY MEDICINE

## 2020-07-27 PROCEDURE — 85025 COMPLETE CBC W/AUTO DIFF WBC: CPT | Performed by: FAMILY MEDICINE

## 2020-07-27 PROCEDURE — 82570 ASSAY OF URINE CREATININE: CPT | Performed by: FAMILY MEDICINE

## 2020-07-27 PROCEDURE — 3061F NEG MICROALBUMINURIA REV: CPT | Performed by: PSYCHIATRY & NEUROLOGY

## 2020-07-27 PROCEDURE — 83036 HEMOGLOBIN GLYCOSYLATED A1C: CPT | Performed by: FAMILY MEDICINE

## 2020-07-28 ENCOUNTER — TELEPHONE (OUTPATIENT)
Dept: FAMILY MEDICINE CLINIC | Facility: CLINIC | Age: 75
End: 2020-07-28

## 2020-07-28 NOTE — TELEPHONE ENCOUNTER
Call re labs  Next appt 10/2020 diabetes well controlled with an A1c 6 4 goal less than 7  Cholesterol 181 < 200 normal  TGs 163 < 150 watch diet  I will review all her results at next visit  Continue with current medications    Recent Results (from the past 336 hour(s))   Hemoglobin A1C    Collection Time: 07/27/20  7:30 AM   Result Value Ref Range    Hemoglobin A1C 6 4 (H) Normal 3 8-5 6%; PreDiabetic 5 7-6 4%;  Diabetic >=6 5%; Glycemic control for adults with diabetes <7 0% %     mg/dl   Comprehensive metabolic panel    Collection Time: 07/27/20  7:30 AM   Result Value Ref Range    Sodium 139 136 - 145 mmol/L    Potassium 3 7 3 5 - 5 3 mmol/L    Chloride 107 100 - 108 mmol/L    CO2 25 21 - 32 mmol/L    ANION GAP 7 4 - 13 mmol/L    BUN 18 5 - 25 mg/dL    Creatinine 0 74 0 60 - 1 30 mg/dL    Glucose, Fasting 133 (H) 65 - 99 mg/dL    Calcium 9 0 8 3 - 10 1 mg/dL    AST 32 5 - 45 U/L    ALT 47 12 - 78 U/L    Alkaline Phosphatase 94 46 - 116 U/L    Total Protein 7 3 6 4 - 8 2 g/dL    Albumin 3 8 3 5 - 5 0 g/dL    Total Bilirubin 0 65 0 20 - 1 00 mg/dL    eGFR 80 ml/min/1 73sq m   CBC and differential    Collection Time: 07/27/20  7:30 AM   Result Value Ref Range    WBC 7 27 4 31 - 10 16 Thousand/uL    RBC 4 98 3 81 - 5 12 Million/uL    Hemoglobin 14 8 11 5 - 15 4 g/dL    Hematocrit 46 5 (H) 34 8 - 46 1 %    MCV 93 82 - 98 fL    MCH 29 7 26 8 - 34 3 pg    MCHC 31 8 31 4 - 37 4 g/dL    RDW 13 7 11 6 - 15 1 %    MPV 13 0 (H) 8 9 - 12 7 fL    Platelets 701 472 - 768 Thousands/uL    nRBC 0 /100 WBCs    Neutrophils Relative 41 (L) 43 - 75 %    Immat GRANS % 0 0 - 2 %    Lymphocytes Relative 47 (H) 14 - 44 %    Monocytes Relative 8 4 - 12 %    Eosinophils Relative 3 0 - 6 %    Basophils Relative 1 0 - 1 %    Neutrophils Absolute 2 95 1 85 - 7 62 Thousands/µL    Immature Grans Absolute 0 02 0 00 - 0 20 Thousand/uL    Lymphocytes Absolute 3 42 0 60 - 4 47 Thousands/µL    Monocytes Absolute 0 60 0 17 - 1 22 Thousand/µL Eosinophils Absolute 0 22 0 00 - 0 61 Thousand/µL    Basophils Absolute 0 06 0 00 - 0 10 Thousands/µL   Lipid panel    Collection Time: 07/27/20  7:30 AM   Result Value Ref Range    Cholesterol 181 50 - 200 mg/dL    Triglycerides 163 (H) <=150 mg/dL    HDL, Direct 42 >=40 mg/dL    LDL Calculated 106 (H) 0 - 100 mg/dL    Non-HDL-Chol (CHOL-HDL) 139 mg/dl   Microalbumin / creatinine urine ratio    Collection Time: 07/27/20  7:30 AM   Result Value Ref Range    Creatinine, Ur 74 3 mg/dL    Microalbum  ,U,Random 22 9 (H) 0 0 - 20 0 mg/L    Microalb Creat Ratio 31 (H) 0 - 30 mg/g creatinine

## 2020-08-12 DIAGNOSIS — I10 ESSENTIAL HYPERTENSION: ICD-10-CM

## 2020-08-12 RX ORDER — AMLODIPINE BESYLATE 5 MG/1
5 TABLET ORAL EVERY EVENING
Qty: 90 TABLET | Refills: 3 | Status: SHIPPED | OUTPATIENT
Start: 2020-08-12 | End: 2021-08-03

## 2020-09-08 NOTE — PATIENT INSTRUCTIONS
Continue with good control of your secondary stroke risk factors including blood pressure, cholesterol, and blood sugar  I will defer monitoring and management of these issues to your primary care physician  Continue your plavix, statin, and blood pressure medications as prescribed  Any new stroke-like symptoms such as facial drooping, slurred speech, painless loss of vision, persistent dizziness, severe head pain, numbness/tingling/weakness on one side of your body you should call 911 or go to the nearest emergency room as soon as possible 
none

## 2020-09-16 ENCOUNTER — IMMUNIZATIONS (OUTPATIENT)
Dept: FAMILY MEDICINE CLINIC | Facility: CLINIC | Age: 75
End: 2020-09-16
Payer: COMMERCIAL

## 2020-09-16 DIAGNOSIS — Z23 NEED FOR VACCINATION: Primary | ICD-10-CM

## 2020-09-16 PROCEDURE — 90662 IIV NO PRSV INCREASED AG IM: CPT

## 2020-09-16 PROCEDURE — G0008 ADMIN INFLUENZA VIRUS VAC: HCPCS

## 2020-10-16 DIAGNOSIS — I63.81 LACUNAR INFARCTION (HCC): ICD-10-CM

## 2020-10-19 RX ORDER — PANTOPRAZOLE SODIUM 40 MG/1
TABLET, DELAYED RELEASE ORAL
Qty: 90 TABLET | Refills: 0 | Status: SHIPPED | OUTPATIENT
Start: 2020-10-19 | End: 2021-01-18 | Stop reason: SDUPTHER

## 2020-10-21 ENCOUNTER — OFFICE VISIT (OUTPATIENT)
Dept: FAMILY MEDICINE CLINIC | Facility: CLINIC | Age: 75
End: 2020-10-21
Payer: COMMERCIAL

## 2020-10-21 VITALS
HEIGHT: 57 IN | HEART RATE: 72 BPM | WEIGHT: 142 LBS | TEMPERATURE: 96.6 F | DIASTOLIC BLOOD PRESSURE: 80 MMHG | OXYGEN SATURATION: 98 % | BODY MASS INDEX: 30.63 KG/M2 | RESPIRATION RATE: 18 BRPM | SYSTOLIC BLOOD PRESSURE: 120 MMHG

## 2020-10-21 DIAGNOSIS — I63.81 LACUNAR INFARCTION (HCC): ICD-10-CM

## 2020-10-21 DIAGNOSIS — E55.9 VITAMIN D DEFICIENCY: ICD-10-CM

## 2020-10-21 DIAGNOSIS — I10 ESSENTIAL HYPERTENSION: ICD-10-CM

## 2020-10-21 DIAGNOSIS — Z00.00 MEDICARE ANNUAL WELLNESS VISIT, SUBSEQUENT: ICD-10-CM

## 2020-10-21 DIAGNOSIS — E11.29 TYPE 2 DIABETES MELLITUS WITH MICROALBUMINURIA, WITHOUT LONG-TERM CURRENT USE OF INSULIN (HCC): Primary | ICD-10-CM

## 2020-10-21 DIAGNOSIS — R80.9 TYPE 2 DIABETES MELLITUS WITH MICROALBUMINURIA, WITHOUT LONG-TERM CURRENT USE OF INSULIN (HCC): Primary | ICD-10-CM

## 2020-10-21 DIAGNOSIS — M85.80 LOW BONE MASS: ICD-10-CM

## 2020-10-21 DIAGNOSIS — E78.2 MIXED HYPERLIPIDEMIA: ICD-10-CM

## 2020-10-21 DIAGNOSIS — D32.9 MENINGIOMA (HCC): ICD-10-CM

## 2020-10-21 PROCEDURE — 1160F RVW MEDS BY RX/DR IN RCRD: CPT | Performed by: FAMILY MEDICINE

## 2020-10-21 PROCEDURE — 3066F NEPHROPATHY DOC TX: CPT | Performed by: FAMILY MEDICINE

## 2020-10-21 PROCEDURE — 3725F SCREEN DEPRESSION PERFORMED: CPT | Performed by: FAMILY MEDICINE

## 2020-10-21 PROCEDURE — 1170F FXNL STATUS ASSESSED: CPT | Performed by: FAMILY MEDICINE

## 2020-10-21 PROCEDURE — 3079F DIAST BP 80-89 MM HG: CPT | Performed by: FAMILY MEDICINE

## 2020-10-21 PROCEDURE — 1125F AMNT PAIN NOTED PAIN PRSNT: CPT | Performed by: FAMILY MEDICINE

## 2020-10-21 PROCEDURE — 99214 OFFICE O/P EST MOD 30 MIN: CPT | Performed by: FAMILY MEDICINE

## 2020-10-21 PROCEDURE — 1036F TOBACCO NON-USER: CPT | Performed by: FAMILY MEDICINE

## 2020-10-21 PROCEDURE — G0439 PPPS, SUBSEQ VISIT: HCPCS | Performed by: FAMILY MEDICINE

## 2020-11-12 ENCOUNTER — TELEMEDICINE (OUTPATIENT)
Dept: FAMILY MEDICINE CLINIC | Facility: CLINIC | Age: 75
End: 2020-11-12
Payer: COMMERCIAL

## 2020-11-12 VITALS — TEMPERATURE: 97.6 F

## 2020-11-12 DIAGNOSIS — I10 ESSENTIAL HYPERTENSION: ICD-10-CM

## 2020-11-12 DIAGNOSIS — E11.29 TYPE 2 DIABETES MELLITUS WITH MICROALBUMINURIA, WITHOUT LONG-TERM CURRENT USE OF INSULIN (HCC): ICD-10-CM

## 2020-11-12 DIAGNOSIS — J02.9 SORE THROAT: Primary | ICD-10-CM

## 2020-11-12 DIAGNOSIS — R80.9 TYPE 2 DIABETES MELLITUS WITH MICROALBUMINURIA, WITHOUT LONG-TERM CURRENT USE OF INSULIN (HCC): ICD-10-CM

## 2020-11-12 PROCEDURE — 1160F RVW MEDS BY RX/DR IN RCRD: CPT | Performed by: FAMILY MEDICINE

## 2020-11-12 PROCEDURE — 99213 OFFICE O/P EST LOW 20 MIN: CPT | Performed by: FAMILY MEDICINE

## 2020-11-12 PROCEDURE — 3066F NEPHROPATHY DOC TX: CPT | Performed by: FAMILY MEDICINE

## 2020-11-12 PROCEDURE — 1036F TOBACCO NON-USER: CPT | Performed by: FAMILY MEDICINE

## 2020-12-14 ENCOUNTER — VBI (OUTPATIENT)
Dept: ADMINISTRATIVE | Facility: OTHER | Age: 75
End: 2020-12-14

## 2020-12-23 DIAGNOSIS — I63.81 LACUNAR INFARCTION (HCC): ICD-10-CM

## 2020-12-23 RX ORDER — CLOPIDOGREL BISULFATE 75 MG/1
TABLET ORAL
Qty: 90 TABLET | Refills: 3 | Status: SHIPPED | OUTPATIENT
Start: 2020-12-23 | End: 2022-01-27 | Stop reason: SDUPTHER

## 2021-01-18 DIAGNOSIS — I63.81 LACUNAR INFARCTION (HCC): ICD-10-CM

## 2021-01-18 RX ORDER — PANTOPRAZOLE SODIUM 40 MG/1
40 TABLET, DELAYED RELEASE ORAL DAILY
Qty: 90 TABLET | Refills: 0 | Status: SHIPPED | OUTPATIENT
Start: 2021-01-18 | End: 2021-04-14

## 2021-01-18 NOTE — TELEPHONE ENCOUNTER
Medication refill check list    Correct patient? Y   Correct medication name, dose, and pill size? Y-Pantoprazole 40mg   Correct provider? Y-Mambalam   Last and Next appt  scheduled? 7/8/2020-7/7/21   Right pharmacy listed? Y-[facility]   Correct quantity for 30 or 90 days? Y-90 days   Is the patient out of refills? When was it last prescribed? Y-10/19/2020   Directions match what the patient says they are taking?  Y-take 1 tablet by mouth once daily   Enough refills? (none for controlled substances, 1 year for routine medications) N-0

## 2021-01-21 ENCOUNTER — IMMUNIZATIONS (OUTPATIENT)
Dept: FAMILY MEDICINE CLINIC | Facility: HOSPITAL | Age: 76
End: 2021-01-21

## 2021-01-21 DIAGNOSIS — Z23 ENCOUNTER FOR IMMUNIZATION: Primary | ICD-10-CM

## 2021-01-21 PROCEDURE — 91300 SARS-COV-2 / COVID-19 MRNA VACCINE (PFIZER-BIONTECH) 30 MCG: CPT

## 2021-01-21 PROCEDURE — 0001A SARS-COV-2 / COVID-19 MRNA VACCINE (PFIZER-BIONTECH) 30 MCG: CPT

## 2021-01-28 LAB
LEFT EYE DIABETIC RETINOPATHY: NORMAL
RIGHT EYE DIABETIC RETINOPATHY: NORMAL

## 2021-02-02 DIAGNOSIS — E78.2 MIXED HYPERLIPIDEMIA: ICD-10-CM

## 2021-02-02 RX ORDER — ATORVASTATIN CALCIUM 40 MG/1
TABLET, FILM COATED ORAL
Qty: 90 TABLET | Refills: 3 | Status: SHIPPED | OUTPATIENT
Start: 2021-02-02 | End: 2022-02-01 | Stop reason: SDUPTHER

## 2021-02-10 ENCOUNTER — IMMUNIZATIONS (OUTPATIENT)
Dept: FAMILY MEDICINE CLINIC | Facility: HOSPITAL | Age: 76
End: 2021-02-10

## 2021-02-10 DIAGNOSIS — Z23 ENCOUNTER FOR IMMUNIZATION: Primary | ICD-10-CM

## 2021-02-10 PROCEDURE — 0002A SARS-COV-2 / COVID-19 MRNA VACCINE (PFIZER-BIONTECH) 30 MCG: CPT

## 2021-02-10 PROCEDURE — 91300 SARS-COV-2 / COVID-19 MRNA VACCINE (PFIZER-BIONTECH) 30 MCG: CPT

## 2021-03-03 ENCOUNTER — TELEPHONE (OUTPATIENT)
Dept: GASTROENTEROLOGY | Facility: CLINIC | Age: 76
End: 2021-03-03

## 2021-03-03 NOTE — TELEPHONE ENCOUNTER
Patient received call that she was due for a colonoscopy  Her last colon in 2015 was normal, she has no family history of colon cancer  She just had 2 days of diarrhea but just had her second covid vaccine, thought maybe from that  She did make an appt to discuss with Dr Ilene Sharif, however if it goes away she will cancel  Updated recall

## 2021-03-17 ENCOUNTER — TELEPHONE (OUTPATIENT)
Dept: GASTROENTEROLOGY | Facility: AMBULATORY SURGERY CENTER | Age: 76
End: 2021-03-17

## 2021-03-19 ENCOUNTER — TELEPHONE (OUTPATIENT)
Dept: FAMILY MEDICINE CLINIC | Facility: CLINIC | Age: 76
End: 2021-03-19

## 2021-03-19 DIAGNOSIS — B00.1 COLD SORE: Primary | ICD-10-CM

## 2021-03-19 NOTE — TELEPHONE ENCOUNTER
Call I would try OTC Abreva   When I put in prescription topical Acyclovir it indicated it is not covered and it is expensive

## 2021-03-19 NOTE — TELEPHONE ENCOUNTER
Patient has 2 fever blisters on her lip  She's been using Amisol but it's not helping  Asking if something can be called in to [facility]

## 2021-04-13 DIAGNOSIS — I63.81 LACUNAR INFARCTION (HCC): ICD-10-CM

## 2021-04-13 DIAGNOSIS — I10 ESSENTIAL HYPERTENSION: ICD-10-CM

## 2021-04-13 RX ORDER — METOPROLOL SUCCINATE 50 MG/1
TABLET, EXTENDED RELEASE ORAL
Qty: 90 TABLET | Refills: 3 | Status: SHIPPED | OUTPATIENT
Start: 2021-04-13 | End: 2022-06-16 | Stop reason: SDUPTHER

## 2021-04-14 ENCOUNTER — RA CDI HCC (OUTPATIENT)
Dept: OTHER | Facility: HOSPITAL | Age: 76
End: 2021-04-14

## 2021-04-14 RX ORDER — PANTOPRAZOLE SODIUM 40 MG/1
TABLET, DELAYED RELEASE ORAL
Qty: 90 TABLET | Refills: 0 | Status: SHIPPED | OUTPATIENT
Start: 2021-04-14 | End: 2022-03-09 | Stop reason: SDUPTHER

## 2021-04-14 NOTE — PROGRESS NOTES
[facility]  coding opportunities             Chart reviewed, (number of) suggestions sent to provider: 2           Patients insurance company: [employer] ([employer]vant[employer] and [employer])     Visit status: Patient arrived for their scheduled appointment   Both dx on bill  Provider never responded to [facility] [employer]  coding request     [facility] [employer]  coding opportunities             Chart reviewed, (number of) suggestions sent to provider: 2      DX:  D32  9-Benign neoplasm of meninges, unspecified  E11 29-Type 2 diabetes mellitus with other diabetic kidney complication         Patients insurance company: [employer] (Medicare Advantage and [employer])

## 2021-04-15 NOTE — PROGRESS NOTES
Assessment & Plan:     K21 9  Gastroesophageal reflux disease without esophagitis   I have evaluated the patient and found the condition to be: Stable  I have evaluated the patient and: Recommended continue with same treatment plan    N32 81  Overactive bladder   I have evaluated the patient and found the condition to be: Stable  I have evaluated the patient and: Recommended continue with same treatment plan    D32 9  Meningioma ([address] )   I have evaluated the patient and found the condition to be: Stable  I have evaluated the patient and: Recommended continue with same treatment plan  The patient should continue treatment and follow-up with:   Neuro surgery    E78 2  Mixed hyperlipidemia   I have evaluated the patient and found the condition to be: Stable  I have evaluated the patient and: Recommended continue with same treatment plan    E11 29  Type 2 diabetes mellitus with hyperalbuminemia   I have evaluated the patient and found the condition to be: Stable  I have evaluated the patient and: Recommended continue with same treatment plan    E55 9  VItamin D deficiency   I have evaluated the patient and found the condition to be: Stable  I have evaluated the patient and: Recommended continue with same treatment plan            Patient ID: Dayana Suazo is a 76 y o  female     Chief Complaint   Patient presents with    Follow-up     blood work      HPI   Follow-up visit  Medications reviewed  Hypertension blood pressures have been stable on Amlodipine 5 mg daily and Metoprolol ER 50 mg daily  04/2020 creatinine 0 71  Electrolytes normal  Hgb 14 8  Type 2 diabetes mellitus diet controlled  04/2021   A1c 6 5   07/2020  Urine microalbumin 22 9  Not on ACE or ARB  No neuropathy symptoms  Eye exam 01/2020  Hyperlipidemia mixed type on Atorvastatin 40 mg daily and 1 fish oil capsule/day  Lipid profile 04/2021 cholesterol 180  Triglycerides 187 increased from 163  HDL 48  LDL 95    LFTs normal   10/2019 TSH 2 360  Mammogram 06/2020       Recent Results (from the past 672 hour(s))   CBC and differential    Collection Time: 04/19/21  7:23 AM   Result Value Ref Range    WBC 7 59 4 31 - 10 16 Thousand/uL    RBC 5 01 3 81 - 5 12 Million/uL    Hemoglobin 14 8 11 5 - 15 4 g/dL    Hematocrit 45 8 34 8 - 46 1 %    MCV 91 82 - 98 fL    MCH 29 5 26 8 - 34 3 pg    MCHC 32 3 31 4 - 37 4 g/dL    RDW 13 5 11 6 - 15 1 %    MPV 12 3 8 9 - 12 7 fL    Platelets 469 346 - 546 Thousands/uL    nRBC 0 /100 WBCs    Neutrophils Relative 42 (L) 43 - 75 %    Immat GRANS % 0 0 - 2 %    Lymphocytes Relative 47 (H) 14 - 44 %    Monocytes Relative 7 4 - 12 %    Eosinophils Relative 3 0 - 6 %    Basophils Relative 1 0 - 1 %    Neutrophils Absolute 3 18 1 85 - 7 62 Thousands/µL    Immature Grans Absolute 0 02 0 00 - 0 20 Thousand/uL    Lymphocytes Absolute 3 59 0 60 - 4 47 Thousands/µL    Monocytes Absolute 0 51 0 17 - 1 22 Thousand/µL    Eosinophils Absolute 0 22 0 00 - 0 61 Thousand/µL    Basophils Absolute 0 07 0 00 - 0 10 Thousands/µL   Comprehensive metabolic panel    Collection Time: 04/19/21  7:23 AM   Result Value Ref Range    Sodium 139 136 - 145 mmol/L    Potassium 3 7 3 5 - 5 3 mmol/L    Chloride 106 100 - 108 mmol/L    CO2 27 21 - 32 mmol/L    ANION GAP 6 4 - 13 mmol/L    BUN 18 5 - 25 mg/dL    Creatinine 0 71 0 60 - 1 30 mg/dL    Glucose, Fasting 151 (H) 65 - 99 mg/dL    Calcium 9 6 8 3 - 10 1 mg/dL    AST 38 5 - 45 U/L    ALT 53 12 - 78 U/L    Alkaline Phosphatase 103 46 - 116 U/L    Total Protein 7 8 6 4 - 8 2 g/dL    Albumin 3 8 3 5 - 5 0 g/dL    Total Bilirubin 0 66 0 20 - 1 00 mg/dL    eGFR 84 ml/min/1 73sq m   Hemoglobin A1C    Collection Time: 04/19/21  7:23 AM   Result Value Ref Range    Hemoglobin A1C 6 5 (H) Normal 3 8-5 6%; PreDiabetic 5 7-6 4%;  Diabetic >=6 5%; Glycemic control for adults with diabetes <7 0% %     mg/dl   Lipid panel    Collection Time: 04/19/21  7:23 AM   Result Value Ref Range    Cholesterol 180 50 - 200 mg/dL    Triglycerides 187 (H) <=150 mg/dL    HDL, Direct 48 >=40 mg/dL    LDL Calculated 95 0 - 100 mg/dL    Non-HDL-Chol (CHOL-HDL) 132 mg/dl     Lab Results   Component Value Date    [id number] 2 360 10/08/2019         Review of Systems   Constitutional: Negative for appetite change, chills, fatigue, fever and unexpected weight change  HENT: Positive for hearing loss (hearing aid left ear )  Negative for congestion, ear pain, rhinorrhea, sore throat and trouble swallowing           09/2017 ENT evaluation for sudden hearing loss right ear  MRI of brain at that time showed no evidence of acoustic neuroma  Small retention cyst left maxillary sinus  1 5 cm frontal meningioma  She was seen and evaluated by neuro surgery  Eyes: Negative for visual disturbance  Respiratory: Negative for cough, shortness of breath and wheezing  Cardiovascular: Negative for chest pain, palpitations and leg swelling  ER admission 06/2017 for accelerated hypertension  07/2017 renal artery Dopplers no renal artery stenosis  07/2017 stress echocardiogram no stress-induced ischemia  No regional wall motion abnormalities  Ejection fraction 60%  Gastrointestinal: Negative for abdominal pain, blood in stool, constipation, diarrhea, nausea and vomiting  GERD stable on Pantoprazole 40 mg daily  No reflux  No dysphagia  Colonoscopy 12/2015   Endocrine:          07/2017 DEXA scan T-score -1 7 left hip consistent with low bone mineral density  On vitamin D supplement  Genitourinary: Negative for difficulty urinating  Overactive bladder with improvement on Ditropan at [facility]  Nocturia 1-2  Occasional stress incontinence  Musculoskeletal: Negative for arthralgias and myalgias  S/p left ankle fracture ORIF 10/2018  OA left knee X-rays 01/2018 showed moderate narrowing of the medial joint compartment and small joint effusion  S/p steroid injection left knee with relief      Skin: Negative for rash    Neurological: Negative for dizziness and headaches  See HEENT ROS  06/2020 stable left frontal lobe meningioma compared to 11/20/2017  No subjacent edema or significant mass effect  Mild to moderate chronic white matter microangiopathy  03/2019 MRI of brain left frontal meningioma minimally larger in AP dimension 7 mm previously 5 mm  Moderate chronic microangiopathy stable  No acute infarction, intracranial hemorrhage or new mas   06/2018 follow-up MRI brain for meningioma left frontal lobe meningioma stable from prior exam scattered moderate chronic microangiopathic changes  7 mm focus of vague diffusion abnormality in the deep white matter of the right frontal parietal junction may represent subacute to chronic lacunar infarct  Chronic lacunar infarct right centrum semi ovale  07/2018 CTA no acute intracranial disease  Foot print of chronic small vessel disease  Small stable anterior left frontal fossa meningioma without mass effect  No large vessel flow restrictive disease within the head or neck  Incidental 12 mm hypodense left thyroid nodule  Patient was switched from ASA to Plavix   Hematological: Negative for adenopathy  Does not bruise/bleed easily  Psychiatric/Behavioral: Negative for dysphoric mood and sleep disturbance         Objective:      /82   Pulse 88   Temp (!) 97 3 °F (36 3 °C)   Resp 16   Ht 4' 9 25" (1 454 m)   Wt 64 9 kg (143 lb)   BMI 30 68 kg/m²     BP Readings from Last 3 Encounters:   04/21/21 122/82   10/21/20 120/80   07/08/20 126/78     Wt Readings from Last 3 Encounters:   04/21/21 64 9 kg (143 lb)   10/21/20 64 4 kg (142 lb)   07/08/20 64 kg (141 lb 3 2 oz)         Problem List Items Addressed This Visit        Digestive    Gastroesophageal reflux disease       Endocrine    Diabetes mellitus, type 2 ([facility] [address] ) - Primary       Cardiovascular and Mediastinum    Essential hypertension       Nervous and Auditory    Meningioma (HCC)    Lacunar infarction ([facility] [address] )       Genitourinary    OAB (overactive bladder)       Other    Mixed hyperlipidemia    Vitamin D deficiency        Continue with current medications  Office visit in 6 months with repeat labs at that time  BMI Counseling: Body mass index is 30 68 kg/m²  The BMI is above normal  Nutrition recommendations include reducing portion sizes, decreasing overall calorie intake, consuming healthier snacks, moderation in carbohydrate intake, reducing intake of saturated fat and trans fat and reducing intake of cholesterol  Exercise recommendations include exercising 3-5 times per week  Physical Exam  Constitutional:       General: She is not in acute distress  HENT:      Right Ear: Tympanic membrane and ear canal normal       Left Ear: Tympanic membrane and ear canal normal    Eyes:      General: No scleral icterus  Extraocular Movements: Extraocular movements intact  Conjunctiva/sclera: Conjunctivae normal       Pupils: Pupils are equal, round, and reactive to light  Neck:      Vascular: No carotid bruit  Cardiovascular:      Rate and Rhythm: Normal rate and regular rhythm  Heart sounds: No murmur  No gallop  Pulmonary:      Effort: Pulmonary effort is normal       Breath sounds: Normal breath sounds  No wheezing or rales  Abdominal:      General: Bowel sounds are normal  There is no distension  Palpations: Abdomen is soft  There is no hepatomegaly, splenomegaly or mass  Tenderness: There is no abdominal tenderness  There is no guarding or rebound  Musculoskeletal:      Right lower leg: No edema  Left lower leg: No edema  Lymphadenopathy:      Cervical: No cervical adenopathy  Skin:     Findings: No rash  Neurological:      General: No focal deficit present  Mental Status: She is alert and oriented to person, place, and time     Psychiatric:         Mood and Affect: Mood normal

## 2021-04-19 ENCOUNTER — TRANSCRIBE ORDERS (OUTPATIENT)
Dept: LAB | Facility: CLINIC | Age: 76
End: 2021-04-19

## 2021-04-19 ENCOUNTER — APPOINTMENT (OUTPATIENT)
Dept: LAB | Facility: CLINIC | Age: 76
End: 2021-04-19
Payer: COMMERCIAL

## 2021-04-19 LAB
ALBUMIN SERPL BCP-MCNC: 3.8 G/DL (ref 3.5–5)
ALP SERPL-CCNC: 103 U/L (ref 46–116)
ALT SERPL W P-5'-P-CCNC: 53 U/L (ref 12–78)
ANION GAP SERPL CALCULATED.3IONS-SCNC: 6 MMOL/L (ref 4–13)
AST SERPL W P-5'-P-CCNC: 38 U/L (ref 5–45)
BASOPHILS # BLD AUTO: 0.07 THOUSANDS/ΜL (ref 0–0.1)
BASOPHILS NFR BLD AUTO: 1 % (ref 0–1)
BILIRUB SERPL-MCNC: 0.66 MG/DL (ref 0.2–1)
BUN SERPL-MCNC: 18 MG/DL (ref 5–25)
CALCIUM SERPL-MCNC: 9.6 MG/DL (ref 8.3–10.1)
CHLORIDE SERPL-SCNC: 106 MMOL/L (ref 100–108)
CHOLEST SERPL-MCNC: 180 MG/DL (ref 50–200)
CO2 SERPL-SCNC: 27 MMOL/L (ref 21–32)
CREAT SERPL-MCNC: 0.71 MG/DL (ref 0.6–1.3)
EOSINOPHIL # BLD AUTO: 0.22 THOUSAND/ΜL (ref 0–0.61)
EOSINOPHIL NFR BLD AUTO: 3 % (ref 0–6)
ERYTHROCYTE [DISTWIDTH] IN BLOOD BY AUTOMATED COUNT: 13.5 % (ref 11.6–15.1)
EST. AVERAGE GLUCOSE BLD GHB EST-MCNC: 140 MG/DL
GFR SERPL CREATININE-BSD FRML MDRD: 84 ML/MIN/1.73SQ M
GLUCOSE P FAST SERPL-MCNC: 151 MG/DL (ref 65–99)
HBA1C MFR BLD: 6.5 %
HCT VFR BLD AUTO: 45.8 % (ref 34.8–46.1)
HDLC SERPL-MCNC: 48 MG/DL
HGB BLD-MCNC: 14.8 G/DL (ref 11.5–15.4)
IMM GRANULOCYTES # BLD AUTO: 0.02 THOUSAND/UL (ref 0–0.2)
IMM GRANULOCYTES NFR BLD AUTO: 0 % (ref 0–2)
LDLC SERPL CALC-MCNC: 95 MG/DL (ref 0–100)
LYMPHOCYTES # BLD AUTO: 3.59 THOUSANDS/ΜL (ref 0.6–4.47)
LYMPHOCYTES NFR BLD AUTO: 47 % (ref 14–44)
MCH RBC QN AUTO: 29.5 PG (ref 26.8–34.3)
MCHC RBC AUTO-ENTMCNC: 32.3 G/DL (ref 31.4–37.4)
MCV RBC AUTO: 91 FL (ref 82–98)
MONOCYTES # BLD AUTO: 0.51 THOUSAND/ΜL (ref 0.17–1.22)
MONOCYTES NFR BLD AUTO: 7 % (ref 4–12)
NEUTROPHILS # BLD AUTO: 3.18 THOUSANDS/ΜL (ref 1.85–7.62)
NEUTS SEG NFR BLD AUTO: 42 % (ref 43–75)
NONHDLC SERPL-MCNC: 132 MG/DL
NRBC BLD AUTO-RTO: 0 /100 WBCS
PLATELET # BLD AUTO: 193 THOUSANDS/UL (ref 149–390)
PMV BLD AUTO: 12.3 FL (ref 8.9–12.7)
POTASSIUM SERPL-SCNC: 3.7 MMOL/L (ref 3.5–5.3)
PROT SERPL-MCNC: 7.8 G/DL (ref 6.4–8.2)
RBC # BLD AUTO: 5.01 MILLION/UL (ref 3.81–5.12)
SODIUM SERPL-SCNC: 139 MMOL/L (ref 136–145)
TRIGL SERPL-MCNC: 187 MG/DL
WBC # BLD AUTO: 7.59 THOUSAND/UL (ref 4.31–10.16)

## 2021-04-19 PROCEDURE — 85025 COMPLETE CBC W/AUTO DIFF WBC: CPT | Performed by: FAMILY MEDICINE

## 2021-04-19 PROCEDURE — 80061 LIPID PANEL: CPT | Performed by: FAMILY MEDICINE

## 2021-04-19 PROCEDURE — 3044F HG A1C LEVEL LT 7.0%: CPT | Performed by: FAMILY MEDICINE

## 2021-04-19 PROCEDURE — 36415 COLL VENOUS BLD VENIPUNCTURE: CPT | Performed by: FAMILY MEDICINE

## 2021-04-19 PROCEDURE — 83036 HEMOGLOBIN GLYCOSYLATED A1C: CPT | Performed by: FAMILY MEDICINE

## 2021-04-19 PROCEDURE — 80053 COMPREHEN METABOLIC PANEL: CPT | Performed by: FAMILY MEDICINE

## 2021-04-21 ENCOUNTER — OFFICE VISIT (OUTPATIENT)
Dept: FAMILY MEDICINE CLINIC | Facility: CLINIC | Age: 76
End: 2021-04-21
Payer: COMMERCIAL

## 2021-04-21 VITALS
HEIGHT: 57 IN | HEART RATE: 88 BPM | RESPIRATION RATE: 16 BRPM | BODY MASS INDEX: 30.85 KG/M2 | DIASTOLIC BLOOD PRESSURE: 82 MMHG | SYSTOLIC BLOOD PRESSURE: 122 MMHG | TEMPERATURE: 97.3 F | WEIGHT: 143 LBS

## 2021-04-21 DIAGNOSIS — E11.29 TYPE 2 DIABETES MELLITUS WITH MICROALBUMINURIA, WITHOUT LONG-TERM CURRENT USE OF INSULIN (HCC): Primary | ICD-10-CM

## 2021-04-21 DIAGNOSIS — D32.9 MENINGIOMA (HCC): ICD-10-CM

## 2021-04-21 DIAGNOSIS — K21.9 GASTROESOPHAGEAL REFLUX DISEASE WITHOUT ESOPHAGITIS: ICD-10-CM

## 2021-04-21 DIAGNOSIS — I10 ESSENTIAL HYPERTENSION: ICD-10-CM

## 2021-04-21 DIAGNOSIS — I63.81 LACUNAR INFARCTION (HCC): ICD-10-CM

## 2021-04-21 DIAGNOSIS — N32.81 OAB (OVERACTIVE BLADDER): ICD-10-CM

## 2021-04-21 DIAGNOSIS — E78.2 MIXED HYPERLIPIDEMIA: ICD-10-CM

## 2021-04-21 DIAGNOSIS — E55.9 VITAMIN D DEFICIENCY: ICD-10-CM

## 2021-04-21 DIAGNOSIS — R80.9 TYPE 2 DIABETES MELLITUS WITH MICROALBUMINURIA, WITHOUT LONG-TERM CURRENT USE OF INSULIN (HCC): Primary | ICD-10-CM

## 2021-04-21 PROCEDURE — 1036F TOBACCO NON-USER: CPT | Performed by: FAMILY MEDICINE

## 2021-04-21 PROCEDURE — 3074F SYST BP LT 130 MM HG: CPT | Performed by: FAMILY MEDICINE

## 2021-04-21 PROCEDURE — 1160F RVW MEDS BY RX/DR IN RCRD: CPT | Performed by: FAMILY MEDICINE

## 2021-04-21 PROCEDURE — 3066F NEPHROPATHY DOC TX: CPT | Performed by: FAMILY MEDICINE

## 2021-04-21 PROCEDURE — 99214 OFFICE O/P EST MOD 30 MIN: CPT | Performed by: FAMILY MEDICINE

## 2021-04-21 PROCEDURE — T1015 CLINIC SERVICE: HCPCS | Performed by: FAMILY MEDICINE

## 2021-04-21 PROCEDURE — 3079F DIAST BP 80-89 MM HG: CPT | Performed by: FAMILY MEDICINE

## 2021-05-17 ENCOUNTER — TELEPHONE (OUTPATIENT)
Dept: FAMILY MEDICINE CLINIC | Facility: CLINIC | Age: 76
End: 2021-05-17

## 2021-05-17 NOTE — TELEPHONE ENCOUNTER
Patient went to [facility] today because she thought she had a stye on her eye by her nose  It's not a stye it's a wart or calcification  They referred her to her ENT doctor  ENT doctor said they wouldn't be able to take care of this that she needed to call her PCP

## 2021-05-18 ENCOUNTER — OFFICE VISIT (OUTPATIENT)
Dept: FAMILY MEDICINE CLINIC | Facility: CLINIC | Age: 76
End: 2021-05-18
Payer: COMMERCIAL

## 2021-05-18 VITALS
WEIGHT: 140 LBS | SYSTOLIC BLOOD PRESSURE: 132 MMHG | BODY MASS INDEX: 30.2 KG/M2 | RESPIRATION RATE: 16 BRPM | HEART RATE: 80 BPM | HEIGHT: 57 IN | DIASTOLIC BLOOD PRESSURE: 78 MMHG | TEMPERATURE: 97 F

## 2021-05-18 DIAGNOSIS — L98.9 NON-HEALING SKIN LESION OF NOSE: Primary | ICD-10-CM

## 2021-05-18 DIAGNOSIS — R80.9 TYPE 2 DIABETES MELLITUS WITH MICROALBUMINURIA, WITHOUT LONG-TERM CURRENT USE OF INSULIN (HCC): ICD-10-CM

## 2021-05-18 DIAGNOSIS — E11.29 TYPE 2 DIABETES MELLITUS WITH MICROALBUMINURIA, WITHOUT LONG-TERM CURRENT USE OF INSULIN (HCC): ICD-10-CM

## 2021-05-18 DIAGNOSIS — I10 ESSENTIAL HYPERTENSION: ICD-10-CM

## 2021-05-18 PROCEDURE — 3078F DIAST BP <80 MM HG: CPT | Performed by: FAMILY MEDICINE

## 2021-05-18 PROCEDURE — 3066F NEPHROPATHY DOC TX: CPT | Performed by: FAMILY MEDICINE

## 2021-05-18 PROCEDURE — T1015 CLINIC SERVICE: HCPCS | Performed by: FAMILY MEDICINE

## 2021-05-18 PROCEDURE — 88305 TISSUE EXAM BY PATHOLOGIST: CPT | Performed by: PATHOLOGY

## 2021-05-18 PROCEDURE — 3075F SYST BP GE 130 - 139MM HG: CPT | Performed by: FAMILY MEDICINE

## 2021-05-18 RX ORDER — NEOMYCIN SULFATE, POLYMYXIN B SULFATE AND DEXAMETHASONE 3.5; 10000; 1 MG/ML; [USP'U]/ML; MG/ML
1 SUSPENSION/ DROPS OPHTHALMIC 3 TIMES DAILY
COMMUNITY
Start: 2021-05-17 | End: 2021-10-25 | Stop reason: ALTCHOICE

## 2021-05-18 NOTE — PROGRESS NOTES
Assessment/Plan:     Diagnoses and all orders for this visit:    Non-healing skin lesion of nose  -     Biopsy  -     Tissue Exam    Type 2 diabetes mellitus with microalbuminuria, without long-term current use of insulin (HCC)    Essential hypertension    Other orders  -     neomycin-polymyxin-dexamethasone (MAXITROL) ophthalmic suspension; Administer 1 drop into the left eye 3 (three) times a day          As a separate procedure today,  I performed a 2 mm punch biopsy- see procedure note  Follow up once results are back     Patient ID: Preeti Loaiza is a 76 y o  female  Patient presents with a nonhealing lesion left side of her nose x 3 months  No change in size and shape  No c color changes  No bleeding  No history of skin cancer  Current medications reviewed  Type 2 diabetes mellitus diet controlled  04/2021 A1c 6 5  Hypertension blood pressure stable on current regimen      The following portions of the patient's history were reviewed and updated as appropriate: allergies, current medications, past family history, past medical history, past social history, past surgical history and problem list     Review of Systems   Skin:        See HPI    Hematological: Negative for adenopathy  Objective:      /78   Pulse 80   Temp (!) 97 °F (36 1 °C)   Resp 16   Ht 4' 9 25" (1 454 m)   Wt 63 5 kg (140 lb)   BMI 30 03 kg/m²          Physical Exam  Constitutional:       General: She is not in acute distress  Lymphadenopathy:      Cervical: No cervical adenopathy  Upper Body:      Right upper body: No supraclavicular adenopathy  Left upper body: No supraclavicular adenopathy  Skin:     Findings: Lesion present  Comments: 1 2 cm raised reddish pink colored nodular lesion left side of nose with overlying scale-see photo   Neurological:      Mental Status: She is alert                 Biopsy    Date/Time: 5/18/2021 11:15 AM  Performed by: Jolanta Davenport MD  Authorized by: Holland Smith Yamilex Golden MD   Universal Protocol:  Consent: Verbal consent obtained  Risks and benefits: risks, benefits and alternatives were discussed  Consent given by: patient  Time out: Immediately prior to procedure a "time out" was called to verify the correct patient, procedure, equipment, support staff and site/side marked as required  Patient identity confirmed: verbally with patient      Procedure Details - Skin Biopsy:     Biopsy tissue type: skin and subcutaneous    Biopsy method: punch biopsy      Body area:  Head/neck    Head/neck location:  Nose (left side of nose )    Initial size (mm):  2    Final defect size (mm):  2    Malignancy: malignancy unknown        Using aseptic technique a 2 mm punch biopsy was performed on nonhealing skin lesion left side of nose  Hemostasis with compression  Wound care instructions reviewed    Specimen sent for biopsy

## 2021-05-24 ENCOUNTER — TELEPHONE (OUTPATIENT)
Dept: FAMILY MEDICINE CLINIC | Facility: CLINIC | Age: 76
End: 2021-05-24

## 2021-05-24 NOTE — TELEPHONE ENCOUNTER
Call re  skin biopsy left side of nose- keratosis with typically benign with some atypical cells  Not definitive for skin cancer I would  recommend that this lesion be removed for further analysis I would recommend seeing Dermatology    Recent Results (from the past 168 hour(s))   Tissue Exam    Collection Time: 05/18/21  3:07 PM   Result Value Ref Range    Case Report       Surgical Pathology Report                         Case: [id number]                                   Authorizing Provider:  Seema Marshall MD         Collected:           05/18/2021 [id number]              Ordering Location:     [address] [place] [address]   Received:            05/18/2021 1506              Pathologist:           Hui Greenfield MD                                                             Specimen:    Nose, left side                                                                            Final Diagnosis       A  Skin lesion, Nose, left side, punch biopsy:  - Minute portion of verrucous keratosis with atypical features, extending to base of biopsy  See Note  -- Entire base of lesion not visualized; underlying malignancy cannot be excluded  Note       An evolving/superficial portion of a  keratoacanthoma (invasive squamous cell carcinoma,   well-differentiated, keratoacanthoma type) cannot be excluded  Excision to ensure complete removal of the lesion and allow for definitive diagnosis is recommended  Additional Information       All reported additional testing was performed with appropriately reactive controls  These tests were developed and their performance characteristics determined by [facility] or appropriate performing facility, though some tests may be performed on tissues which have not been validated for performance characteristics (such as staining performed on alcohol exposed cell blocks and decalcified tissues)    Results should be interpreted with caution and in the context of the patients clinical condition  These tests may not be cleared or approved by the [place]  Food and Drug Administration, though the FDA has determined that such clearance or approval is not necessary  These tests are used for clinical purposes and they should not be regarded as investigational or for research  This laboratory has been approved by CLIA 88, designated as a high-complexity laboratory and is qualified to perform these tests  Interpretation performed at [facility], [address] [place] [address]  Gross Description          A  The specimen is received in formalin, labeled with the patient's name and hospital number, and is designated "left side nose  The specimen consists of a tan, ulcerated, punch-shaped portion of skin measuring 0 2 cm in diameter with attached underlying soft tissue to a depth of 0 2 cm  The margin of resection is inked green and the epithelial surface is inked red  The specimen is entirely submitted between sponges in 1 cassette  Note: The estimated total formalin fixation time based upon information provided by the submitting clinician and the standard processing schedule is under 72 hours    [place]          Clinical Information non healing skin lesion

## 2021-05-27 ENCOUNTER — TELEPHONE (OUTPATIENT)
Dept: FAMILY MEDICINE CLINIC | Facility: CLINIC | Age: 76
End: 2021-05-27

## 2021-05-27 ENCOUNTER — TELEMEDICINE (OUTPATIENT)
Dept: FAMILY MEDICINE CLINIC | Facility: CLINIC | Age: 76
End: 2021-05-27

## 2021-05-27 DIAGNOSIS — J06.9 ACUTE URI: Primary | ICD-10-CM

## 2021-05-27 PROCEDURE — 1036F TOBACCO NON-USER: CPT | Performed by: FAMILY MEDICINE

## 2021-05-27 PROCEDURE — 99213 OFFICE O/P EST LOW 20 MIN: CPT | Performed by: FAMILY MEDICINE

## 2021-05-27 PROCEDURE — 1160F RVW MEDS BY RX/DR IN RCRD: CPT | Performed by: FAMILY MEDICINE

## 2021-05-27 RX ORDER — AZITHROMYCIN 250 MG/1
TABLET, FILM COATED ORAL
Qty: 6 TABLET | Refills: 0 | Status: SHIPPED | OUTPATIENT
Start: 2021-05-27 | End: 2021-05-31

## 2021-05-27 NOTE — PROGRESS NOTES
Virtual Regular Visit      Assessment/Plan:    Problem List Items Addressed This Visit     None      Visit Diagnoses     Acute URI    -  Primary    Relevant Medications    azithromycin (ZITHROMAX) 250 mg tablet          Continue with symptom treatment for cough  Recheck as needed       Reason for visit is   Chief Complaint   Patient presents with    Virtual Regular Visit        Encounter provider Pat Dnoahue MD    Provider located at [address] [id number] [phone]      Recent Visits  Date Type Provider Dept   05/24/21 Telephone Pat Donahue MD [facility] recent visits within past 7 days and meeting all other requirements     Today's Visits  Date Type Provider Dept   05/27/21 Telemedicine Pat Donahue MD [facility]   Showing today's visits and meeting all other requirements     Future Appointments  No visits were found meeting these conditions  Showing future appointments within next 150 days and meeting all other requirements        The patient was identified by name and date of birth  Marian Tapia was informed that this is a telemedicine visit and that the visit is being conducted through [facility] and patient was informed that this is not a secure, HIPAA-compliant platform  She agrees to proceed     My office door was closed  No one else was in the room  She acknowledged consent and understanding of privacy and security of the video platform  The patient has agreed to participate and understands they can discontinue the visit at any time  Patient is aware this is a billable service  Subjective  Marian Tapia is a 76 y o  female    2 day history of persistent cough at times productive of green phlegm intermittent sneezing  No nasal congestion or postnasal drainage  No fevers or chills  No shortness of breath or wheezing  She has been using as needed Robitussin DM  No history of COVID-19 exposure  She completed COVID-19 vaccine series 02/2021       Past Medical History:   Diagnosis Date    Endometriosis     GERD (gastroesophageal reflux disease)     Hyperlipidemia     Hypertension     malignant / last assessed 6/16/17    IFG (impaired fasting glucose)     last assessed 6/16/17    Stroke ([address] ) 06/2018    no residual    Sudden-onset sensorineural hearing loss 12/6/2017       Past Surgical History:   Procedure Laterality Date    BUNIONECTOMY Right 2010    COLONOSCOPY      complete    HYSTERECTOMY      total abdominal with b/l ovary removal    KNEE ARTHROSCOPY Right     menisectomy    KNEE CARTILAGE SURGERY Right     theraputic    MN OPEN TX DISTAL FIBULAR FRACTURE LAT MALLEOLUS Left 10/18/2018    Procedure: OPEN REDUCTION INTERNAL FIXATION LEFT ANKLE LATERAL MALLEOLUS;  Surgeon: Joshua Tejada MD;  Location: [facility];  Service: Orthopedics       Current Outpatient Medications   Medication Sig Dispense Refill    acetaminophen (TYLENOL) 500 mg tablet Take 500 mg by mouth every 6 (six) hours as needed for mild pain      amLODIPine (NORVASC) 5 mg tablet Take 1 tablet (5 mg total) by mouth every evening 90 tablet 3    atorvastatin (LIPITOR) 40 mg tablet take 1 tablet by mouth once daily 90 tablet 3    azithromycin (ZITHROMAX) 250 mg tablet Take 2 tablets today then 1 tablet daily x 4 days 6 tablet 0    cholecalciferol (VITAMIN D3) 1,000 units tablet Take 2,000 Units by mouth daily      clopidogrel (PLAVIX) 75 mg tablet take 1 tablet by mouth once daily 90 tablet 3    metoprolol succinate (TOPROL-XL) 50 mg 24 hr tablet take 1 tablet by mouth once daily 90 tablet 3    Multiple Vitamin (MULTIVITAMIN) tablet Take 1 tablet by mouth daily      neomycin-polymyxin-dexamethasone (MAXITROL) ophthalmic suspension Administer 1 drop into the left eye 3 (three) times a day      oxybutynin (DITROPAN-XL) 5 mg 24 hr tablet Take 1 tablet (5 mg total) by mouth daily 90 tablet 3    pantoprazole (PROTONIX) 40 mg tablet take 1 tablet by mouth once daily 90 tablet 0     No current facility-administered medications for this visit  Allergies   Allergen Reactions    Oxycodone Nausea Only     Vomits from medication every time  She likes tylenol with codeine, that does not make her sick    Penicillins Rash       Review of Systems   Constitutional: Positive for fatigue  Negative for chills and fever  HENT: Positive for sneezing  Negative for congestion, postnasal drip, rhinorrhea, sinus pressure and sore throat  Respiratory: Positive for cough  Negative for shortness of breath and wheezing  Gastrointestinal: Negative for diarrhea, nausea and vomiting  Musculoskeletal: Negative for myalgias  Skin: Negative for rash  Neurological: Negative for headaches  Hematological: Negative for adenopathy  Video Exam    There were no vitals filed for this visit  Physical Exam  Constitutional:       General: She is not in acute distress  Eyes:      Conjunctiva/sclera: Conjunctivae normal    Pulmonary:      Effort: Pulmonary effort is normal  No respiratory distress  Breath sounds: No wheezing  Comments: No audible wheezing  Neurological:      Mental Status: She is alert and oriented to person, place, and time  Psychiatric:         Mood and Affect: Mood normal           I spent 7 minutes directly with the patient during this visit      VIRTUAL VISIT W[place] Y[address] 1724 acknowledges that she has consented to an online visit or consultation  She understands that the online visit is based solely on information provided by her, and that, in the absence of a face-to-face physical evaluation by the physician, the diagnosis she receives is both limited and provisional in terms of accuracy and completeness  This is not intended to replace a full medical face-to-face evaluation by the physician  Radha Lucia understands and accepts these terms

## 2021-05-27 NOTE — TELEPHONE ENCOUNTER
Patient requesting referral to see Dr Horace Escobar, Dermatology for the lesion on her nose      Fax: [phone]

## 2021-05-28 DIAGNOSIS — L98.9 NON-HEALING SKIN LESION OF NOSE: Primary | ICD-10-CM

## 2021-07-07 ENCOUNTER — OFFICE VISIT (OUTPATIENT)
Dept: NEUROLOGY | Facility: CLINIC | Age: 76
End: 2021-07-07
Payer: COMMERCIAL

## 2021-07-07 VITALS
SYSTOLIC BLOOD PRESSURE: 142 MMHG | DIASTOLIC BLOOD PRESSURE: 80 MMHG | BODY MASS INDEX: 30.42 KG/M2 | WEIGHT: 141 LBS | HEIGHT: 57 IN

## 2021-07-07 DIAGNOSIS — I63.81 LACUNAR INFARCTION (HCC): ICD-10-CM

## 2021-07-07 DIAGNOSIS — D32.9 MENINGIOMA (HCC): Primary | ICD-10-CM

## 2021-07-07 DIAGNOSIS — H90.3 SENSORINEURAL HEARING LOSS (SNHL), BILATERAL: ICD-10-CM

## 2021-07-07 PROCEDURE — 3077F SYST BP >= 140 MM HG: CPT | Performed by: PSYCHIATRY & NEUROLOGY

## 2021-07-07 PROCEDURE — 1036F TOBACCO NON-USER: CPT | Performed by: PSYCHIATRY & NEUROLOGY

## 2021-07-07 PROCEDURE — 99214 OFFICE O/P EST MOD 30 MIN: CPT | Performed by: PSYCHIATRY & NEUROLOGY

## 2021-07-07 PROCEDURE — 3079F DIAST BP 80-89 MM HG: CPT | Performed by: PSYCHIATRY & NEUROLOGY

## 2021-07-07 PROCEDURE — 1160F RVW MEDS BY RX/DR IN RCRD: CPT | Performed by: PSYCHIATRY & NEUROLOGY

## 2021-07-07 NOTE — PROGRESS NOTES
Patient ID: Nika Hayward is a 76 y o  female  Assessment/Plan: This is a 77 y/o F who is here as a semi-centrum ovale infarct  Patient has hypertension, DM  PLAN:        Diagnoses and all orders for this visit:    Meningioma [facility])  -MRI brain next year  -patient following neurosurgery    Sensorineural hearing loss (SNHL), bilateral    Lacunar infarction ([address] )  -for stroke prevention continue with combination of plavix and atorvastatin  -BP goal < 130/80, BP is at goal in the office  Counseling -   -does not smoke at this time   -denies any history of snoring    -I advised patient to avoid using NSAIDs for headaches or other pain and to stick to tylenol if needed  -Recommend mediterranean diet & regular exercise regimen atleast 4-5 times a week for 20-30 minutes  -I educated patient/family regarding medication compliance       Follow up - as needed  I would be happy to see the patient sooner if any new questions/concerns arise  Patient/Guardian was advised to the call the office if they have any questions and concerns in the meantime  Patient/Guardian does understand that if they have any new stroke like symptoms such as facial droop on one side, weakness/paralysis on either side, speech trouble, numbness on one side, balance issues, any vision changes, extreme dizziness or any new headache, to call 9-1-1 immediately or to proceed to the nearest ER immediately  Face to face time spent 14 minutes  Greater than 50% of total time was spent with the patient and or family counseling and or coordination of care  Non face to face time spent 13  minutes  Subjective:    HPI    This is a 77 y/o Female who is here as a follow up for subacute infarct in the semi-centrum ovale  Patient denied any new TIA/CVA like symptoms  She is doing well overall  She says that she is waiting to get MRI brain next year   She also states that she is complaint w/ her medications and is tolerating them well without any side effects  She says that she has been exercising and staying active  The following portions of the patient's history were reviewed and updated as appropriate:   She  has a past medical history of Endometriosis, GERD (gastroesophageal reflux disease), Hyperlipidemia, Hypertension, IFG (impaired fasting glucose), Stroke ([facility] ) (06/2018), and Sudden-onset sensorineural hearing loss (12/6/2017)  She   Patient Active Problem List    Diagnosis Date Noted    Sensorineural hearing loss (SNHL), bilateral 02/27/2019    Lacunar infarction ([facility] ) 07/17/2018    Primary osteoarthritis of left knee 01/11/2018    Meningioma ([place] [address] ) 12/06/2017    Rosacea 09/12/2017    Low bone mass 08/23/2017    Vitamin D deficiency 08/23/2017    Diabetes mellitus, type 2 ([place] [address] ) 06/16/2017    Essential hypertension     Mixed hyperlipidemia     Gastroesophageal reflux disease     OAB (overactive bladder) 05/29/2016     She  has a past surgical history that includes Hysterectomy; Bunionectomy (Right, 2010); Knee cartilage surgery (Right); Colonoscopy; Knee arthroscopy (Right); and pr open tx distal fibular fracture lat malleolus (Left, 10/18/2018)  Her family history includes Breast cancer in her sister; Cancer in her family; Cancer (age of onset: 47) in her brother; Coronary artery disease in her sister; Diabetes in her brother; Early death in her mother; Heart attack in her mother and sister; Heart disease in her sister; No Known Problems in her daughter, daughter, daughter, father, maternal grandfather, maternal grandmother, paternal grandfather, paternal grandmother, sister, and son  She  reports that she has never smoked  She has never used smokeless tobacco  She reports that she does not drink alcohol and does not use drugs    Current Outpatient Medications   Medication Sig Dispense Refill    acetaminophen (TYLENOL) 500 mg tablet Take 500 mg by mouth every 6 (six) hours as needed for mild pain      amLODIPine (NORVASC) 5 mg tablet Take 1 tablet (5 mg total) by mouth every evening 90 tablet 3    atorvastatin (LIPITOR) 40 mg tablet take 1 tablet by mouth once daily 90 tablet 3    cholecalciferol (VITAMIN D3) 1,000 units tablet Take 2,000 Units by mouth daily      clopidogrel (PLAVIX) 75 mg tablet take 1 tablet by mouth once daily 90 tablet 3    metoprolol succinate (TOPROL-XL) 50 mg 24 hr tablet take 1 tablet by mouth once daily 90 tablet 3    Multiple Vitamin (MULTIVITAMIN) tablet Take 1 tablet by mouth daily      neomycin-polymyxin-dexamethasone (MAXITROL) ophthalmic suspension Administer 1 drop into the left eye 3 (three) times a day      oxybutynin (DITROPAN-XL) 5 mg 24 hr tablet Take 1 tablet (5 mg total) by mouth daily 90 tablet 3    pantoprazole (PROTONIX) 40 mg tablet take 1 tablet by mouth once daily 90 tablet 0     No current facility-administered medications for this visit  Current Outpatient Medications on File Prior to Visit   Medication Sig    acetaminophen (TYLENOL) 500 mg tablet Take 500 mg by mouth every 6 (six) hours as needed for mild pain    amLODIPine (NORVASC) 5 mg tablet Take 1 tablet (5 mg total) by mouth every evening    atorvastatin (LIPITOR) 40 mg tablet take 1 tablet by mouth once daily    cholecalciferol (VITAMIN D3) 1,000 units tablet Take 2,000 Units by mouth daily    clopidogrel (PLAVIX) 75 mg tablet take 1 tablet by mouth once daily    metoprolol succinate (TOPROL-XL) 50 mg 24 hr tablet take 1 tablet by mouth once daily    Multiple Vitamin (MULTIVITAMIN) tablet Take 1 tablet by mouth daily    neomycin-polymyxin-dexamethasone (MAXITROL) ophthalmic suspension Administer 1 drop into the left eye 3 (three) times a day    oxybutynin (DITROPAN-XL) 5 mg 24 hr tablet Take 1 tablet (5 mg total) by mouth daily    pantoprazole (PROTONIX) 40 mg tablet take 1 tablet by mouth once daily     No current facility-administered medications on file prior to visit       She is allergic to oxycodone and penicillins            Objective:    Blood pressure 142/80, height 4' 9 25" (1 454 m), weight 64 kg (141 lb), not currently breastfeeding  Physical Exam  General - alert, awake follows commands  Motor - no pronator drift noted  Gait - normal  Neurological Exam      ROS:  I reviewed ROS   Review of Systems   Constitutional: Negative  Negative for appetite change and fever  HENT: Negative  Negative for hearing loss, tinnitus, trouble swallowing and voice change  Eyes: Negative  Negative for photophobia and pain  Respiratory: Negative  Negative for shortness of breath  Cardiovascular: Negative  Negative for palpitations  Gastrointestinal: Negative  Negative for nausea and vomiting  Endocrine: Negative  Negative for cold intolerance  Genitourinary: Negative  Negative for dysuria, frequency and urgency  Musculoskeletal: Negative  Negative for myalgias and neck pain  Skin: Negative  Negative for rash  Neurological: Negative  Negative for dizziness, tremors, seizures, syncope, facial asymmetry, speech difficulty, weakness, light-headedness, numbness and headaches  Hematological: Negative  Does not bruise/bleed easily  Psychiatric/Behavioral: Negative  Negative for confusion, hallucinations and sleep disturbance  All other systems reviewed and are negative

## 2021-07-11 DIAGNOSIS — N32.81 OAB (OVERACTIVE BLADDER): ICD-10-CM

## 2021-07-12 RX ORDER — OXYBUTYNIN CHLORIDE 5 MG/1
TABLET, EXTENDED RELEASE ORAL
Qty: 90 TABLET | Refills: 3 | Status: SHIPPED | OUTPATIENT
Start: 2021-07-12

## 2021-08-03 DIAGNOSIS — I10 ESSENTIAL HYPERTENSION: ICD-10-CM

## 2021-08-03 RX ORDER — AMLODIPINE BESYLATE 5 MG/1
TABLET ORAL
Qty: 90 TABLET | Refills: 3 | Status: SHIPPED | OUTPATIENT
Start: 2021-08-03

## 2021-10-15 ENCOUNTER — RA CDI HCC (OUTPATIENT)
Dept: OTHER | Facility: HOSPITAL | Age: 76
End: 2021-10-15

## 2021-10-20 ENCOUNTER — APPOINTMENT (OUTPATIENT)
Dept: LAB | Facility: CLINIC | Age: 76
End: 2021-10-20
Payer: COMMERCIAL

## 2021-10-20 LAB
ALBUMIN SERPL BCP-MCNC: 3.7 G/DL (ref 3.5–5)
ALP SERPL-CCNC: 91 U/L (ref 46–116)
ALT SERPL W P-5'-P-CCNC: 44 U/L (ref 12–78)
ANION GAP SERPL CALCULATED.3IONS-SCNC: 4 MMOL/L (ref 4–13)
AST SERPL W P-5'-P-CCNC: 31 U/L (ref 5–45)
BASOPHILS # BLD AUTO: 0.07 THOUSANDS/ΜL (ref 0–0.1)
BASOPHILS NFR BLD AUTO: 1 % (ref 0–1)
BILIRUB SERPL-MCNC: 0.56 MG/DL (ref 0.2–1)
BUN SERPL-MCNC: 12 MG/DL (ref 5–25)
CALCIUM SERPL-MCNC: 9.6 MG/DL (ref 8.3–10.1)
CHLORIDE SERPL-SCNC: 108 MMOL/L (ref 100–108)
CHOLEST SERPL-MCNC: 201 MG/DL (ref 50–200)
CO2 SERPL-SCNC: 26 MMOL/L (ref 21–32)
CREAT SERPL-MCNC: 0.68 MG/DL (ref 0.6–1.3)
CREAT UR-MCNC: 29.5 MG/DL
EOSINOPHIL # BLD AUTO: 0.22 THOUSAND/ΜL (ref 0–0.61)
EOSINOPHIL NFR BLD AUTO: 3 % (ref 0–6)
ERYTHROCYTE [DISTWIDTH] IN BLOOD BY AUTOMATED COUNT: 13.4 % (ref 11.6–15.1)
EST. AVERAGE GLUCOSE BLD GHB EST-MCNC: 148 MG/DL
GFR SERPL CREATININE-BSD FRML MDRD: 86 ML/MIN/1.73SQ M
GLUCOSE P FAST SERPL-MCNC: 168 MG/DL (ref 65–99)
HBA1C MFR BLD: 6.8 %
HCT VFR BLD AUTO: 44.8 % (ref 34.8–46.1)
HDLC SERPL-MCNC: 51 MG/DL
HGB BLD-MCNC: 14.5 G/DL (ref 11.5–15.4)
IMM GRANULOCYTES # BLD AUTO: 0.01 THOUSAND/UL (ref 0–0.2)
IMM GRANULOCYTES NFR BLD AUTO: 0 % (ref 0–2)
LDLC SERPL CALC-MCNC: 103 MG/DL (ref 0–100)
LYMPHOCYTES # BLD AUTO: 2.91 THOUSANDS/ΜL (ref 0.6–4.47)
LYMPHOCYTES NFR BLD AUTO: 45 % (ref 14–44)
MCH RBC QN AUTO: 30 PG (ref 26.8–34.3)
MCHC RBC AUTO-ENTMCNC: 32.4 G/DL (ref 31.4–37.4)
MCV RBC AUTO: 93 FL (ref 82–98)
MICROALBUMIN UR-MCNC: 28 MG/L (ref 0–20)
MICROALBUMIN/CREAT 24H UR: 95 MG/G CREATININE (ref 0–30)
MONOCYTES # BLD AUTO: 0.58 THOUSAND/ΜL (ref 0.17–1.22)
MONOCYTES NFR BLD AUTO: 9 % (ref 4–12)
NEUTROPHILS # BLD AUTO: 2.72 THOUSANDS/ΜL (ref 1.85–7.62)
NEUTS SEG NFR BLD AUTO: 42 % (ref 43–75)
NONHDLC SERPL-MCNC: 150 MG/DL
NRBC BLD AUTO-RTO: 0 /100 WBCS
PLATELET # BLD AUTO: 174 THOUSANDS/UL (ref 149–390)
PMV BLD AUTO: 11.9 FL (ref 8.9–12.7)
POTASSIUM SERPL-SCNC: 3.6 MMOL/L (ref 3.5–5.3)
PROT SERPL-MCNC: 7.6 G/DL (ref 6.4–8.2)
RBC # BLD AUTO: 4.83 MILLION/UL (ref 3.81–5.12)
SODIUM SERPL-SCNC: 138 MMOL/L (ref 136–145)
TRIGL SERPL-MCNC: 237 MG/DL
TSH SERPL DL<=0.05 MIU/L-ACNC: 1.47 UIU/ML (ref 0.36–3.74)
WBC # BLD AUTO: 6.51 THOUSAND/UL (ref 4.31–10.16)

## 2021-10-20 PROCEDURE — 82043 UR ALBUMIN QUANTITATIVE: CPT | Performed by: FAMILY MEDICINE

## 2021-10-20 PROCEDURE — 80053 COMPREHEN METABOLIC PANEL: CPT | Performed by: FAMILY MEDICINE

## 2021-10-20 PROCEDURE — 80061 LIPID PANEL: CPT | Performed by: FAMILY MEDICINE

## 2021-10-20 PROCEDURE — 36415 COLL VENOUS BLD VENIPUNCTURE: CPT | Performed by: FAMILY MEDICINE

## 2021-10-20 PROCEDURE — 3044F HG A1C LEVEL LT 7.0%: CPT | Performed by: FAMILY MEDICINE

## 2021-10-20 PROCEDURE — 3061F NEG MICROALBUMINURIA REV: CPT | Performed by: FAMILY MEDICINE

## 2021-10-20 PROCEDURE — 84443 ASSAY THYROID STIM HORMONE: CPT | Performed by: FAMILY MEDICINE

## 2021-10-20 PROCEDURE — 83036 HEMOGLOBIN GLYCOSYLATED A1C: CPT | Performed by: FAMILY MEDICINE

## 2021-10-20 PROCEDURE — 82570 ASSAY OF URINE CREATININE: CPT | Performed by: FAMILY MEDICINE

## 2021-10-20 PROCEDURE — 85025 COMPLETE CBC W/AUTO DIFF WBC: CPT | Performed by: FAMILY MEDICINE

## 2021-10-25 ENCOUNTER — OFFICE VISIT (OUTPATIENT)
Dept: FAMILY MEDICINE CLINIC | Facility: CLINIC | Age: 76
End: 2021-10-25
Payer: COMMERCIAL

## 2021-10-25 VITALS
RESPIRATION RATE: 16 BRPM | DIASTOLIC BLOOD PRESSURE: 82 MMHG | SYSTOLIC BLOOD PRESSURE: 132 MMHG | TEMPERATURE: 97.8 F | HEART RATE: 80 BPM | BODY MASS INDEX: 31.28 KG/M2 | HEIGHT: 57 IN | WEIGHT: 145 LBS

## 2021-10-25 DIAGNOSIS — I10 ESSENTIAL HYPERTENSION: ICD-10-CM

## 2021-10-25 DIAGNOSIS — E11.29 TYPE 2 DIABETES MELLITUS WITH MICROALBUMINURIA, WITHOUT LONG-TERM CURRENT USE OF INSULIN (HCC): Primary | ICD-10-CM

## 2021-10-25 DIAGNOSIS — R80.9 TYPE 2 DIABETES MELLITUS WITH MICROALBUMINURIA, WITHOUT LONG-TERM CURRENT USE OF INSULIN (HCC): Primary | ICD-10-CM

## 2021-10-25 DIAGNOSIS — Z00.00 MEDICARE ANNUAL WELLNESS VISIT, SUBSEQUENT: ICD-10-CM

## 2021-10-25 DIAGNOSIS — E78.2 MIXED HYPERLIPIDEMIA: ICD-10-CM

## 2021-10-25 DIAGNOSIS — D32.9 MENINGIOMA (HCC): ICD-10-CM

## 2021-10-25 DIAGNOSIS — I63.81 LACUNAR INFARCTION (HCC): ICD-10-CM

## 2021-10-25 PROCEDURE — 99214 OFFICE O/P EST MOD 30 MIN: CPT | Performed by: FAMILY MEDICINE

## 2021-10-25 PROCEDURE — 3079F DIAST BP 80-89 MM HG: CPT | Performed by: FAMILY MEDICINE

## 2021-10-25 PROCEDURE — 1036F TOBACCO NON-USER: CPT | Performed by: FAMILY MEDICINE

## 2021-10-25 PROCEDURE — 3075F SYST BP GE 130 - 139MM HG: CPT | Performed by: FAMILY MEDICINE

## 2021-10-25 PROCEDURE — 3725F SCREEN DEPRESSION PERFORMED: CPT | Performed by: FAMILY MEDICINE

## 2021-10-25 PROCEDURE — 1101F PT FALLS ASSESS-DOCD LE1/YR: CPT | Performed by: FAMILY MEDICINE

## 2021-10-25 PROCEDURE — 1170F FXNL STATUS ASSESSED: CPT | Performed by: FAMILY MEDICINE

## 2021-10-25 PROCEDURE — G0444 DEPRESSION SCREEN ANNUAL: HCPCS | Performed by: FAMILY MEDICINE

## 2021-10-25 PROCEDURE — 1160F RVW MEDS BY RX/DR IN RCRD: CPT | Performed by: FAMILY MEDICINE

## 2021-10-25 PROCEDURE — 3066F NEPHROPATHY DOC TX: CPT | Performed by: FAMILY MEDICINE

## 2021-10-25 PROCEDURE — 3288F FALL RISK ASSESSMENT DOCD: CPT | Performed by: FAMILY MEDICINE

## 2021-10-25 PROCEDURE — G0439 PPPS, SUBSEQ VISIT: HCPCS | Performed by: FAMILY MEDICINE

## 2021-10-25 PROCEDURE — 1125F AMNT PAIN NOTED PAIN PRSNT: CPT | Performed by: FAMILY MEDICINE

## 2021-12-23 ENCOUNTER — HOSPITAL ENCOUNTER (OUTPATIENT)
Dept: RADIOLOGY | Facility: MEDICAL CENTER | Age: 76
Discharge: HOME | End: 2021-12-23
Payer: COMMERCIAL

## 2021-12-23 VITALS — WEIGHT: 145 LBS | BODY MASS INDEX: 31.28 KG/M2 | HEIGHT: 57 IN

## 2021-12-23 DIAGNOSIS — Z12.31 SCREENING MAMMOGRAM FOR HIGH-RISK PATIENT: ICD-10-CM

## 2021-12-23 DIAGNOSIS — Z12.0 ENCOUNTER FOR SCREENING FOR MALIGNANT NEOPLASM OF STOMACH: ICD-10-CM

## 2021-12-23 DIAGNOSIS — Z12.31 VISIT FOR SCREENING MAMMOGRAM: ICD-10-CM

## 2021-12-23 PROCEDURE — 77067 SCR MAMMO BI INCL CAD: CPT

## 2021-12-23 PROCEDURE — 77063 BREAST TOMOSYNTHESIS BI: CPT

## 2021-12-30 ENCOUNTER — TELEMEDICINE (OUTPATIENT)
Dept: FAMILY MEDICINE CLINIC | Facility: CLINIC | Age: 76
End: 2021-12-30
Payer: COMMERCIAL

## 2021-12-30 DIAGNOSIS — J01.10 ACUTE NON-RECURRENT FRONTAL SINUSITIS: Primary | ICD-10-CM

## 2021-12-30 PROCEDURE — 1036F TOBACCO NON-USER: CPT | Performed by: PHYSICIAN ASSISTANT

## 2021-12-30 PROCEDURE — 99213 OFFICE O/P EST LOW 20 MIN: CPT | Performed by: PHYSICIAN ASSISTANT

## 2021-12-30 RX ORDER — FLUTICASONE PROPIONATE 50 MCG
2 SPRAY, SUSPENSION (ML) NASAL 2 TIMES DAILY
Qty: 18.2 ML | Refills: 0 | Status: SHIPPED | OUTPATIENT
Start: 2021-12-30

## 2021-12-30 RX ORDER — DOXYCYCLINE HYCLATE 100 MG
100 TABLET ORAL 2 TIMES DAILY
Qty: 10 TABLET | Refills: 0 | Status: SHIPPED | OUTPATIENT
Start: 2021-12-30 | End: 2022-01-04

## 2022-01-27 DIAGNOSIS — I63.81 LACUNAR INFARCTION (HCC): ICD-10-CM

## 2022-01-27 RX ORDER — CLOPIDOGREL BISULFATE 75 MG/1
75 TABLET ORAL DAILY
Qty: 90 TABLET | Refills: 3 | Status: SHIPPED | OUTPATIENT
Start: 2022-01-27

## 2022-01-27 NOTE — TELEPHONE ENCOUNTER
Granddaughter and patient called in for refill of plavix 75 mg daily to go to the [facility] in [place]  Last office visit 7/7/21  No f/u appt     Assisted them in scheduling yearly appt 7/13/22 in [facility]  Sent appt card  Script pended for review and signature

## 2022-02-01 DIAGNOSIS — E78.2 MIXED HYPERLIPIDEMIA: ICD-10-CM

## 2022-02-01 RX ORDER — ATORVASTATIN CALCIUM 40 MG/1
40 TABLET, FILM COATED ORAL DAILY
Qty: 90 TABLET | Refills: 3 | Status: SHIPPED | OUTPATIENT
Start: 2022-02-01

## 2022-03-09 DIAGNOSIS — I63.81 LACUNAR INFARCTION (HCC): ICD-10-CM

## 2022-03-09 RX ORDER — PANTOPRAZOLE SODIUM 40 MG/1
40 TABLET, DELAYED RELEASE ORAL DAILY
Qty: 90 TABLET | Refills: 0 | Status: SHIPPED | OUTPATIENT
Start: 2022-03-09 | End: 2022-07-12 | Stop reason: SDUPTHER

## 2022-04-19 ENCOUNTER — TELEPHONE (OUTPATIENT)
Dept: FAMILY MEDICINE CLINIC | Facility: CLINIC | Age: 77
End: 2022-04-19

## 2022-04-19 ENCOUNTER — RA CDI HCC (OUTPATIENT)
Dept: OTHER | Facility: HOSPITAL | Age: 77
End: 2022-04-19

## 2022-04-19 NOTE — TELEPHONE ENCOUNTER
Pt called stating she has developed a sinus infection  States her drainage is green in color  Also complains of a hoarse voice  Asking if you could please call in something to the pharmacy for her

## 2022-04-19 NOTE — PROGRESS NOTES
[facility]  coding opportunities       Chart reviewed, no opportunity found: CHART REVIEWED, NO OPPORTUNITY FOUND        Patients Insurance     Medicare Insurance: [employer] [facility] Advantage

## 2022-04-26 ENCOUNTER — OFFICE VISIT (OUTPATIENT)
Dept: FAMILY MEDICINE CLINIC | Facility: CLINIC | Age: 77
End: 2022-04-26
Payer: COMMERCIAL

## 2022-04-26 VITALS
BODY MASS INDEX: 30.42 KG/M2 | HEIGHT: 57 IN | WEIGHT: 141 LBS | RESPIRATION RATE: 16 BRPM | DIASTOLIC BLOOD PRESSURE: 74 MMHG | HEART RATE: 64 BPM | SYSTOLIC BLOOD PRESSURE: 122 MMHG | TEMPERATURE: 95.8 F

## 2022-04-26 DIAGNOSIS — D32.9 MENINGIOMA (HCC): ICD-10-CM

## 2022-04-26 DIAGNOSIS — N32.81 OAB (OVERACTIVE BLADDER): ICD-10-CM

## 2022-04-26 DIAGNOSIS — R80.9 TYPE 2 DIABETES MELLITUS WITH MICROALBUMINURIA, WITHOUT LONG-TERM CURRENT USE OF INSULIN (HCC): Primary | ICD-10-CM

## 2022-04-26 DIAGNOSIS — I10 ESSENTIAL HYPERTENSION: ICD-10-CM

## 2022-04-26 DIAGNOSIS — E11.29 TYPE 2 DIABETES MELLITUS WITH MICROALBUMINURIA, WITHOUT LONG-TERM CURRENT USE OF INSULIN (HCC): Primary | ICD-10-CM

## 2022-04-26 DIAGNOSIS — K21.9 GASTROESOPHAGEAL REFLUX DISEASE WITHOUT ESOPHAGITIS: ICD-10-CM

## 2022-04-26 DIAGNOSIS — E78.2 MIXED HYPERLIPIDEMIA: ICD-10-CM

## 2022-04-26 PROCEDURE — 3078F DIAST BP <80 MM HG: CPT | Performed by: FAMILY MEDICINE

## 2022-04-26 PROCEDURE — 1160F RVW MEDS BY RX/DR IN RCRD: CPT | Performed by: FAMILY MEDICINE

## 2022-04-26 PROCEDURE — 99214 OFFICE O/P EST MOD 30 MIN: CPT | Performed by: FAMILY MEDICINE

## 2022-04-26 PROCEDURE — 3074F SYST BP LT 130 MM HG: CPT | Performed by: FAMILY MEDICINE

## 2022-04-26 PROCEDURE — 1036F TOBACCO NON-USER: CPT | Performed by: FAMILY MEDICINE

## 2022-04-26 NOTE — PROGRESS NOTES
Assessment/Plan:     Diagnoses and all orders for this visit:    Type 2 diabetes mellitus with microalbuminuria, without long-term current use of insulin (HCC)    Essential hypertension    Mixed hyperlipidemia    Gastroesophageal reflux disease without esophagitis    Meningioma (HCC)    OAB (overactive bladder)          Continue with current medications  Repeat labs  OV 6 months      Patient ID: Mark Lerma is a 68 y o  female  HPI   Follow-up visit  Medications reviewed  Last labs 10/2021 see note  Hypertension blood pressures have been stable on Amlodipine 5 mg daily and Metoprolol ER 50 mg daily  Creatinine 0 68  Electrolytes normal  Hgb 14 5  Type 2 diabetes mellitus diet controlled    A1c 6 8 increased from 6 5  Urine microalbumin 28 increased from 22 9  Not on ACE or ARB  No neuropathy symptoms  Current with eye exam    Hyperlipidemia mixed type on Atorvastatin 40 mg daily and 1 fish oil capsule/day  Lipid profile cholesterol 201 increased from 180  Triglycerides 237 increased from 187  HDL 51    LFTs normal  TSH 1 470      Mammogram 12/2021    Lab Results   Component Value Date    WBC 6 51 10/20/2021    HGB 14 5 10/20/2021    HCT 44 8 10/20/2021    MCV 93 10/20/2021     10/20/2021     Lab Results   Component Value Date     10/29/2015    SODIUM 138 10/20/2021    K 3 6 10/20/2021     10/20/2021    CO2 26 10/20/2021    ANIONGAP 8 10/29/2015    AGAP 4 10/20/2021    BUN 12 10/20/2021    CREATININE 0 68 10/20/2021    GLUC 127 10/16/2018    GLUF 168 (H) 10/20/2021    CALCIUM 9 6 10/20/2021    AST 31 10/20/2021    ALT 44 10/20/2021    ALKPHOS 91 10/20/2021    PROT 7 4 10/29/2015    TP 7 6 10/20/2021    BILITOT 0 69 10/29/2015    TBILI 0 56 10/20/2021    EGFR 86 10/20/2021     Lab Results   Component Value Date    CHOLESTEROL 201 (H) 10/20/2021    CHOLESTEROL 180 04/19/2021    CHOLESTEROL 181 07/27/2020     Lab Results   Component Value Date    HDL 51 10/20/2021    HDL 48 04/19/2021    HDL 42 07/27/2020     Lab Results   Component Value Date    TRIG 237 (H) 10/20/2021    TRIG 187 (H) 04/19/2021    TRIG 163 (H) 07/27/2020     Lab Results   Component Value Date    LDLCALC 103 (H) 10/20/2021     Lab Results   Component Value Date    HGBA1C 6 8 (H) 10/20/2021     Lab Results   Component Value Date    [id number] 1 470 10/20/2021             The following portions of the patient's history were reviewed and updated as appropriate: allergies, current medications, past family history, past medical history, past social history, past surgical history and problem list     Review of Systems   Constitutional: Positive for unexpected weight change (4 lb weight loss from 12/2021)  Negative for appetite change, chills, fatigue and fever  HENT: Positive for hearing loss (hearing aid left ear )  Negative for congestion, ear pain, rhinorrhea, sore throat and trouble swallowing           09/2017 ENT evaluation for sudden hearing loss right ear  MRI of brain at that time showed no evidence of acoustic neuroma  Small retention cyst left maxillary sinus  1 5 cm frontal meningioma  She was seen and evaluated by neuro surgery  Eyes: Negative for visual disturbance  Respiratory: Negative for cough, shortness of breath and wheezing  Cardiovascular: Negative for chest pain, palpitations and leg swelling  ER admission 06/2017 for accelerated hypertension  07/2017 renal artery Dopplers no renal artery stenosis  07/2017 stress echocardiogram no stress-induced ischemia  No regional wall motion abnormalities  Ejection fraction 60%  Gastrointestinal: Negative for abdominal pain, blood in stool, constipation, diarrhea, nausea and vomiting  GERD stable on Pantoprazole 40 mg daily  No reflux  No dysphagia  Colonoscopy 12/2015   Endocrine:          07/2017 DEXA scan T-score -1 7 left hip consistent with low bone mineral density  On vitamin D supplement      Genitourinary: Negative for difficulty urinating  Overactive bladder with improvement on Ditropan at [place] [facility] [place]  Nocturia 1-2  Occasional stress incontinence  Musculoskeletal: Negative for arthralgias and myalgias  S/p left ankle fracture ORIF 10/2018  OA left knee X-rays 01/2018 showed moderate narrowing of the medial joint compartment and small joint effusion  S/p steroid injection left knee with relief  Skin: Negative for rash  Neurological: Negative for dizziness and headaches  See HEENT ROS  06/2020 stable left frontal lobe meningioma compared to 11/20/2017  No subjacent edema or significant mass effect  Mild to moderate chronic white matter microangiopathy  03/2019 MRI of brain left frontal meningioma minimally larger in AP dimension 7 mm previously 5 mm  Moderate chronic microangiopathy stable  No acute infarction, intracranial hemorrhage or new mas   06/2018 follow-up MRI brain for meningioma left frontal lobe meningioma stable from prior exam scattered moderate chronic microangiopathic changes  7 mm focus of vague diffusion abnormality in the deep white matter of the right frontal parietal junction may represent subacute to chronic lacunar infarct  Chronic lacunar infarct right centrum semi ovale  07/2018 CTA no acute intracranial disease  Foot print of chronic small vessel disease  Small stable anterior left frontal fossa meningioma without mass effect  No large vessel flow restrictive disease within the head or neck  Incidental 12 mm hypodense left thyroid nodule  Patient was switched from ASA to Plavix   Hematological: Negative for adenopathy  Does not bruise/bleed easily  Psychiatric/Behavioral: Negative for dysphoric mood and sleep disturbance           Objective:      /74   Pulse 64   Temp (!) 95 8 °F (35 4 °C)   Resp 16   Ht 4' 9" (1 448 m)   Wt 64 kg (141 lb)   BMI 30 51 kg/m²     BP Readings from Last 3 Encounters:   04/26/22 122/74   10/25/21 132/82   07/07/21 142/80     Wt Readings from Last 3 Encounters:   04/26/22 64 kg (141 lb)   12/23/21 65 8 kg (145 lb)   10/25/21 65 8 kg (145 lb)        Physical Exam  Vitals and nursing note reviewed  Constitutional:       General: She is not in acute distress  Appearance: She is well-developed  HENT:      Right Ear: Tympanic membrane and ear canal normal       Left Ear: Tympanic membrane and ear canal normal    Eyes:      General: No scleral icterus  Extraocular Movements: Extraocular movements intact  Conjunctiva/sclera: Conjunctivae normal       Pupils: Pupils are equal, round, and reactive to light  Neck:      Thyroid: No thyroid mass or thyromegaly  Vascular: No carotid bruit or JVD  Trachea: No tracheal deviation  Cardiovascular:      Rate and Rhythm: Normal rate and regular rhythm  Pulses: no weak pulses          Dorsalis pedis pulses are 2+ on the right side and 2+ on the left side  Posterior tibial pulses are 2+ on the right side and 2+ on the left side  Heart sounds: Normal heart sounds  No murmur heard  No gallop  Pulmonary:      Effort: Pulmonary effort is normal  No respiratory distress  Breath sounds: Normal breath sounds  No wheezing or rales  Chest:   Breasts:      Right: No supraclavicular adenopathy  Left: No supraclavicular adenopathy  Musculoskeletal:      Right lower leg: No edema  Left lower leg: No edema  Feet:      Right foot:      Skin integrity: No ulcer, skin breakdown, erythema, warmth, callus or dry skin  Left foot:      Skin integrity: No ulcer, skin breakdown, erythema, warmth, callus or dry skin  Lymphadenopathy:      Cervical: No cervical adenopathy  Upper Body:      Right upper body: No supraclavicular adenopathy  Left upper body: No supraclavicular adenopathy  Skin:     Findings: No rash  Nails: There is no clubbing  Neurological:      General: No focal deficit present        Mental Status: She is alert and oriented to person, place, and time  Psychiatric:         Mood and Affect: Mood normal          Behavior: Behavior normal          Patient's shoes and socks removed  Right Foot/Ankle   Right Foot Inspection  Skin Exam: skin normal and skin intact  No dry skin, no warmth, no callus, no erythema, no maceration, no abnormal color, no pre-ulcer, no ulcer and no callus  Toe Exam: right toe deformity (Bunion )  No swelling, no tenderness and erythema    Sensory   Monofilament testing: intact    Vascular  Capillary refills: < 3 seconds  The right DP pulse is 2+  The right PT pulse is 2+  Left Foot/Ankle  Left Foot Inspection  Skin Exam: skin normal and skin intact  No dry skin, no warmth, no erythema, no maceration, normal color, no pre-ulcer, no ulcer and no callus  Toe Exam: left toe deformity (Bunion )  No swelling, no tenderness and no erythema  Sensory   Monofilament testing: intact    Vascular  Capillary refills: < 3 seconds  The left DP pulse is 2+  The left PT pulse is 2+       Assign Risk Category  Deformity present  No loss of protective sensation  No weak pulses  Risk: 2

## 2022-05-10 ENCOUNTER — APPOINTMENT (OUTPATIENT)
Dept: LAB | Facility: CLINIC | Age: 77
End: 2022-05-10
Payer: COMMERCIAL

## 2022-05-10 LAB
ALBUMIN SERPL BCP-MCNC: 3.8 G/DL (ref 3.5–5)
ALP SERPL-CCNC: 114 U/L (ref 46–116)
ALT SERPL W P-5'-P-CCNC: 45 U/L (ref 12–78)
ANION GAP SERPL CALCULATED.3IONS-SCNC: 6 MMOL/L (ref 4–13)
AST SERPL W P-5'-P-CCNC: 40 U/L (ref 5–45)
BASOPHILS # BLD AUTO: 0.05 THOUSANDS/ΜL (ref 0–0.1)
BASOPHILS NFR BLD AUTO: 1 % (ref 0–1)
BILIRUB SERPL-MCNC: 0.63 MG/DL (ref 0.2–1)
BUN SERPL-MCNC: 14 MG/DL (ref 5–25)
CALCIUM SERPL-MCNC: 9.3 MG/DL (ref 8.3–10.1)
CHLORIDE SERPL-SCNC: 105 MMOL/L (ref 100–108)
CHOLEST SERPL-MCNC: 168 MG/DL
CO2 SERPL-SCNC: 27 MMOL/L (ref 21–32)
CREAT SERPL-MCNC: 0.78 MG/DL (ref 0.6–1.3)
CREAT UR-MCNC: 17.9 MG/DL
EOSINOPHIL # BLD AUTO: 0.22 THOUSAND/ΜL (ref 0–0.61)
EOSINOPHIL NFR BLD AUTO: 3 % (ref 0–6)
ERYTHROCYTE [DISTWIDTH] IN BLOOD BY AUTOMATED COUNT: 13.9 % (ref 11.6–15.1)
EST. AVERAGE GLUCOSE BLD GHB EST-MCNC: 157 MG/DL
GFR SERPL CREATININE-BSD FRML MDRD: 74 ML/MIN/1.73SQ M
GLUCOSE P FAST SERPL-MCNC: 156 MG/DL (ref 65–99)
HBA1C MFR BLD: 7.1 %
HCT VFR BLD AUTO: 44.8 % (ref 34.8–46.1)
HDLC SERPL-MCNC: 47 MG/DL
HGB BLD-MCNC: 14.5 G/DL (ref 11.5–15.4)
IMM GRANULOCYTES # BLD AUTO: 0.02 THOUSAND/UL (ref 0–0.2)
IMM GRANULOCYTES NFR BLD AUTO: 0 % (ref 0–2)
LDLC SERPL CALC-MCNC: 91 MG/DL (ref 0–100)
LYMPHOCYTES # BLD AUTO: 4.07 THOUSANDS/ΜL (ref 0.6–4.47)
LYMPHOCYTES NFR BLD AUTO: 50 % (ref 14–44)
MCH RBC QN AUTO: 29.2 PG (ref 26.8–34.3)
MCHC RBC AUTO-ENTMCNC: 32.4 G/DL (ref 31.4–37.4)
MCV RBC AUTO: 90 FL (ref 82–98)
MICROALBUMIN UR-MCNC: 20.4 MG/L (ref 0–20)
MICROALBUMIN/CREAT 24H UR: 114 MG/G CREATININE (ref 0–30)
MONOCYTES # BLD AUTO: 0.53 THOUSAND/ΜL (ref 0.17–1.22)
MONOCYTES NFR BLD AUTO: 7 % (ref 4–12)
NEUTROPHILS # BLD AUTO: 3.16 THOUSANDS/ΜL (ref 1.85–7.62)
NEUTS SEG NFR BLD AUTO: 39 % (ref 43–75)
NONHDLC SERPL-MCNC: 121 MG/DL
NRBC BLD AUTO-RTO: 0 /100 WBCS
PLATELET # BLD AUTO: 212 THOUSANDS/UL (ref 149–390)
PMV BLD AUTO: 11.7 FL (ref 8.9–12.7)
POTASSIUM SERPL-SCNC: 3.8 MMOL/L (ref 3.5–5.3)
PROT SERPL-MCNC: 7.8 G/DL (ref 6.4–8.2)
RBC # BLD AUTO: 4.97 MILLION/UL (ref 3.81–5.12)
SODIUM SERPL-SCNC: 138 MMOL/L (ref 136–145)
TRIGL SERPL-MCNC: 149 MG/DL
WBC # BLD AUTO: 8.05 THOUSAND/UL (ref 4.31–10.16)

## 2022-05-10 PROCEDURE — 82570 ASSAY OF URINE CREATININE: CPT | Performed by: FAMILY MEDICINE

## 2022-05-10 PROCEDURE — 80053 COMPREHEN METABOLIC PANEL: CPT | Performed by: FAMILY MEDICINE

## 2022-05-10 PROCEDURE — 82043 UR ALBUMIN QUANTITATIVE: CPT | Performed by: FAMILY MEDICINE

## 2022-05-10 PROCEDURE — 36415 COLL VENOUS BLD VENIPUNCTURE: CPT | Performed by: FAMILY MEDICINE

## 2022-05-10 PROCEDURE — 83036 HEMOGLOBIN GLYCOSYLATED A1C: CPT | Performed by: FAMILY MEDICINE

## 2022-05-10 PROCEDURE — 85025 COMPLETE CBC W/AUTO DIFF WBC: CPT | Performed by: FAMILY MEDICINE

## 2022-05-10 PROCEDURE — 80061 LIPID PANEL: CPT | Performed by: FAMILY MEDICINE

## 2022-06-16 DIAGNOSIS — I10 ESSENTIAL HYPERTENSION: ICD-10-CM

## 2022-06-16 RX ORDER — METOPROLOL SUCCINATE 50 MG/1
50 TABLET, EXTENDED RELEASE ORAL DAILY
Qty: 90 TABLET | Refills: 3 | Status: SHIPPED | OUTPATIENT
Start: 2022-06-16

## 2022-06-24 ENCOUNTER — HOSPITAL ENCOUNTER (OUTPATIENT)
Dept: MRI IMAGING | Facility: HOSPITAL | Age: 77
Discharge: HOME | End: 2022-06-24
Attending: FAMILY MEDICINE
Payer: COMMERCIAL

## 2022-06-24 DIAGNOSIS — D32.9 MENINGIOMA (HCC): ICD-10-CM

## 2022-06-24 PROCEDURE — G1004 CDSM NDSC: HCPCS

## 2022-06-24 PROCEDURE — 70553 MRI BRAIN STEM W/O & W/DYE: CPT

## 2022-06-24 PROCEDURE — A9585 GADOBUTROL INJECTION: HCPCS | Performed by: FAMILY MEDICINE

## 2022-06-24 RX ADMIN — GADOBUTROL 6.5 ML: 604.72 INJECTION INTRAVENOUS at 10:36

## 2022-07-12 DIAGNOSIS — I63.81 LACUNAR INFARCTION (HCC): ICD-10-CM

## 2022-07-12 RX ORDER — PANTOPRAZOLE SODIUM 40 MG/1
40 TABLET, DELAYED RELEASE ORAL DAILY
Qty: 90 TABLET | Refills: 3 | Status: SHIPPED | OUTPATIENT
Start: 2022-07-12

## 2022-07-13 ENCOUNTER — OFFICE VISIT (OUTPATIENT)
Dept: NEUROLOGY | Facility: CLINIC | Age: 77
End: 2022-07-13
Payer: COMMERCIAL

## 2022-07-13 VITALS
WEIGHT: 141 LBS | HEART RATE: 76 BPM | OXYGEN SATURATION: 98 % | SYSTOLIC BLOOD PRESSURE: 136 MMHG | BODY MASS INDEX: 30.51 KG/M2 | DIASTOLIC BLOOD PRESSURE: 76 MMHG

## 2022-07-13 DIAGNOSIS — I63.81 LACUNAR INFARCTION (HCC): ICD-10-CM

## 2022-07-13 DIAGNOSIS — D32.9 MENINGIOMA (HCC): ICD-10-CM

## 2022-07-13 DIAGNOSIS — R06.83 SNORING: Primary | ICD-10-CM

## 2022-07-13 PROCEDURE — 3078F DIAST BP <80 MM HG: CPT | Performed by: PSYCHIATRY & NEUROLOGY

## 2022-07-13 PROCEDURE — 99214 OFFICE O/P EST MOD 30 MIN: CPT | Performed by: PSYCHIATRY & NEUROLOGY

## 2022-07-13 PROCEDURE — 1160F RVW MEDS BY RX/DR IN RCRD: CPT | Performed by: PSYCHIATRY & NEUROLOGY

## 2022-07-13 PROCEDURE — 3075F SYST BP GE 130 - 139MM HG: CPT | Performed by: PSYCHIATRY & NEUROLOGY

## 2022-07-13 NOTE — PROGRESS NOTES
Patient ID: Mike Ramirez is a 68 y o  female  Assessment/Plan: This is a 69 y/o Female who is here as a follow up for lacunar infarct, and meningioma  PLAN:      Diagnoses and all orders for this visit:    Snoring  -sleep medicine referral    -     Ambulatory Referral to Sleep Medicine; Future    Meningioma (HCC)    Lacunar infarction ([facility] [address] )  -for stroke prevention continue with combination of plavix and lipitor   -BP goal < 130/80, BP is at goal in the office  -LDL goal <70  -does not smoke at this time   -no snoring  -I advised patient to avoid using NSAIDs for headaches or other pain and to stick to tylenol if needed  -Recommend mediterranean diet & regular exercise regimen atleast 4-5 times a week for 20-30 minutes  -I educated patient/family regarding medication compliance       Follow up - as needed  I would be happy to see the patient sooner if any new questions/concerns arise  Patient/Guardian was advised to the call the office if they have any questions and concerns in the meantime  Patient/Guardian does understand that if they have any new stroke like symptoms such as facial droop on one side, weakness/paralysis on either side, speech trouble, numbness on one side, balance issues, any vision changes, extreme dizziness or any new headache, to call 9-1-1 immediately or to proceed to the nearest ER immediately  Subjective:    HPI    68-year-old female who is here as a follow-up of history of stroke  Patient is doing well overall  Denies any new TIA/Cerebrovascular accident like symptoms  Patient on medications  He is tolerating them well without any issues  Denies any bleeding or bruising at this time  Is compliant with medications  Does not have any residual deficits from stroke  She does follow up with Neurosurgery as well for the meningioma  She had a recent MRI brain which I reviewed images of which showed stable meningioma      The following portions of the patient's history were reviewed and updated as appropriate:   She  has a past medical history of Endometriosis, GERD (gastroesophageal reflux disease), Hyperlipidemia, Hypertension, IFG (impaired fasting glucose), Stroke ([facility] [address] ) (06/2018), and Sudden-onset sensorineural hearing loss (12/6/2017)  She   Patient Active Problem List    Diagnosis Date Noted    Sensorineural hearing loss (SNHL), bilateral 02/27/2019    [facility] ) 07/17/2018    Primary osteoarthritis of left knee 01/11/2018    M[facility]oma [facility] ) 12/06/2017    Rosacea 09/12/2017    Low bone mass 08/23/2017    Vitamin D deficiency 08/23/2017    Type 2 diabetes mellitus with microalbuminuria, without long-term current use of insulin ([facility] ) 06/16/2017    Essential hypertension     Mixed hyperlipidemia     Gastroesophageal reflux disease     OAB (overactive bladder) 05/29/2016     She  has a past surgical history that includes Hysterectomy; Bunionectomy (Right, 2010); Knee cartilage surgery (Right); Colonoscopy; Knee arthroscopy (Right); and pr open tx distal fibular fracture lat malleolus (Left, 10/18/2018)  Her family history includes Breast cancer in her sister; Cancer in her family; Cancer (age of onset: 47) in her brother; Coronary artery disease in her sister; Diabetes in her brother; Early death in her mother; Heart attack in her mother and sister; Heart disease in her sister; No Known Problems in her daughter, daughter, daughter, father, maternal grandfather, maternal grandmother, paternal grandfather, paternal grandmother, sister, and son  She  reports that she has never smoked  She has never used smokeless tobacco  She reports that she does not drink alcohol and does not use drugs    Current Outpatient Medications   Medication Sig Dispense Refill    acetaminophen (TYLENOL) 500 mg tablet Take 500 mg by mouth every 6 (six) hours as needed for mild pain      amLODIPine (NORVASC) 5 mg tablet take 1 tablet by mouth every evening 90 tablet 3  atorvastatin (LIPITOR) 40 mg tablet Take 1 tablet (40 mg total) by mouth daily 90 tablet 3    cholecalciferol (VITAMIN D3) 1,000 units tablet Take 2,000 Units by mouth daily      clopidogrel (PLAVIX) 75 mg tablet Take 1 tablet (75 mg total) by mouth daily 90 tablet 3    fluticasone (FLONASE) 50 mcg/act nasal spray 2 sprays into each nostril 2 (two) times a day 18 2 mL 0    metoprolol succinate (TOPROL-XL) 50 mg 24 hr tablet Take 1 tablet (50 mg total) by mouth daily 90 tablet 3    Multiple Vitamin (MULTIVITAMIN) tablet Take 1 tablet by mouth daily      oxybutynin (DITROPAN-XL) 5 mg 24 hr tablet take 1 tablet by mouth once daily 90 tablet 3    pantoprazole (PROTONIX) 40 mg tablet Take 1 tablet (40 mg total) by mouth daily 90 tablet 3     No current facility-administered medications for this visit  Current Outpatient Medications on File Prior to Visit   Medication Sig    acetaminophen (TYLENOL) 500 mg tablet Take 500 mg by mouth every 6 (six) hours as needed for mild pain    amLODIPine (NORVASC) 5 mg tablet take 1 tablet by mouth every evening    atorvastatin (LIPITOR) 40 mg tablet Take 1 tablet (40 mg total) by mouth daily    cholecalciferol (VITAMIN D3) 1,000 units tablet Take 2,000 Units by mouth daily    clopidogrel (PLAVIX) 75 mg tablet Take 1 tablet (75 mg total) by mouth daily    fluticasone (FLONASE) 50 mcg/act nasal spray 2 sprays into each nostril 2 (two) times a day    metoprolol succinate (TOPROL-XL) 50 mg 24 hr tablet Take 1 tablet (50 mg total) by mouth daily    Multiple Vitamin (MULTIVITAMIN) tablet Take 1 tablet by mouth daily    oxybutynin (DITROPAN-XL) 5 mg 24 hr tablet take 1 tablet by mouth once daily    pantoprazole (PROTONIX) 40 mg tablet Take 1 tablet (40 mg total) by mouth daily     No current facility-administered medications on file prior to visit  She is allergic to oxycodone and penicillins            Objective:    Blood pressure 136/76, pulse 76, weight 64 kg (141 lb), SpO2 98 %, not currently breastfeeding  Physical Exam  General - Patient is alert, awake, oriented to time, place and person, follows commands    Neuro:   Cranial nerves: PERRL, EOMI, facial sensation intact, able to raise eyebrows symmetrically, cannot assess facial droop and tongue deviation due to face mask requirement  Motor: 5/5 throughout, normal tone, no pronator drift noted  Sensory - intact to soft touch throughout  Reflexes - 2+ throughout  Coordination - no ataxia/dysmetria noted  Gait - normal    ROS:    Review of Systems   Constitutional: Negative  Negative for appetite change and fever  HENT: Positive for hearing loss  Negative for tinnitus, trouble swallowing and voice change  Lost hearing years ago, wears wearing aide left ear  Eyes: Negative  Negative for photophobia and pain  Respiratory: Negative  Negative for shortness of breath  Cardiovascular: Negative  Negative for palpitations  Gastrointestinal: Negative  Negative for nausea and vomiting  Endocrine: Negative  Negative for cold intolerance  Genitourinary: Negative  Negative for dysuria, frequency and urgency  Musculoskeletal: Positive for gait problem  Negative for myalgias and neck pain  Arthritis in left knee  Skin: Negative  Negative for rash  Neurological: Negative for dizziness, tremors, seizures, syncope, facial asymmetry, speech difficulty, weakness, light-headedness, numbness and headaches  Hematological: Bruises/bleeds easily  Medication induced  Psychiatric/Behavioral: Negative  Negative for confusion, hallucinations and sleep disturbance

## 2022-07-19 NOTE — TELEPHONE ENCOUNTER
Called patient and advised
Labs ordered
Patient called to request LAB orders prior ro her appointment on 2/28/19  Let patient know when orders are in Epic  She will go to [place] [id number] for testing      Rocky Horner [phone]
As certified below, I, or a nurse practitioner or physician assistant working with me, had a face-to-face encounter that meets the physician face-to-face encounter requirements.

## 2022-08-12 ENCOUNTER — OFFICE VISIT (OUTPATIENT)
Dept: NEUROSURGERY | Facility: CLINIC | Age: 77
End: 2022-08-12
Payer: COMMERCIAL

## 2022-08-12 VITALS
DIASTOLIC BLOOD PRESSURE: 80 MMHG | BODY MASS INDEX: 31.28 KG/M2 | RESPIRATION RATE: 16 BRPM | WEIGHT: 145 LBS | TEMPERATURE: 98.7 F | HEIGHT: 57 IN | SYSTOLIC BLOOD PRESSURE: 139 MMHG | HEART RATE: 69 BPM

## 2022-08-12 DIAGNOSIS — D32.9 MENINGIOMA (HCC): Primary | ICD-10-CM

## 2022-08-12 PROCEDURE — 3079F DIAST BP 80-89 MM HG: CPT | Performed by: NEUROLOGICAL SURGERY

## 2022-08-12 PROCEDURE — 99213 OFFICE O/P EST LOW 20 MIN: CPT | Performed by: NEUROLOGICAL SURGERY

## 2022-08-12 PROCEDURE — 1160F RVW MEDS BY RX/DR IN RCRD: CPT | Performed by: NEUROLOGICAL SURGERY

## 2022-08-12 PROCEDURE — 3075F SYST BP GE 130 - 139MM HG: CPT | Performed by: NEUROLOGICAL SURGERY

## 2022-08-12 NOTE — PROGRESS NOTES
Neurosurgery Office Note  Keith Olivares 68 y o  female MRN: [id number]      Assessment/Plan      Diagnoses and all orders for this visit:    Meningioma [facility])        Discussion:    Pain Score: 0-No pain    Left frontal pole extra-axial mass consistent with meningioma, followed since 2017  No appreciable change in size since then, no adjacent edema  I did review with the patient the NCCN guidelines for management, but encouraged that she will need no further follow-up  She is in agreement    08/12/22 Metrics: [id number] 83295=1 000; VAS 80      CHIEF COMPLAINT    Chief Complaint   Patient presents with    Follow-up     2 Years F/u for MRI brain w/wo 7/24/22 [facility]       HISTORY    History of Present Illness     68y o  year old female     HPI    See Discussion    REVIEW OF SYSTEMS    Review of Systems   Constitutional: Positive for fatigue  HENT: Positive for hearing loss (left ear wears hearing aid, right ear loss)  Eyes: Negative  Wears glasses   Respiratory: Negative  Cardiovascular: Negative  Gastrointestinal: Negative  Endocrine: Negative  Genitourinary: Positive for frequency (nocturia x2-3)  Musculoskeletal: Negative  Skin: Negative  Allergic/Immunologic: Negative  Neurological: Negative  Hx of stroke   Hematological: Bruises/bleeds easily (patient on Plavix)  Psychiatric/Behavioral: Negative           Memory loss, more age related         Meds/Allergies     Current Outpatient Medications   Medication Sig Dispense Refill    acetaminophen (TYLENOL) 500 mg tablet Take 500 mg by mouth every 6 (six) hours as needed for mild pain      amLODIPine (NORVASC) 5 mg tablet take 1 tablet by mouth every evening 90 tablet 3    atorvastatin (LIPITOR) 40 mg tablet Take 1 tablet (40 mg total) by mouth daily 90 tablet 3    cholecalciferol (VITAMIN D3) 1,000 units tablet Take 2,000 Units by mouth daily      clopidogrel (PLAVIX) 75 mg tablet Take 1 tablet (75 mg total) by mouth daily 90 tablet 3    fluticasone (FLONASE) 50 mcg/act nasal spray 2 sprays into each nostril 2 (two) times a day (Patient taking differently: 2 sprays into each nostril if needed) 18 2 mL 0    metoprolol succinate (TOPROL-XL) 50 mg 24 hr tablet Take 1 tablet (50 mg total) by mouth daily 90 tablet 3    Multiple Vitamin (MULTIVITAMIN) tablet Take 1 tablet by mouth daily      oxybutynin (DITROPAN-XL) 5 mg 24 hr tablet take 1 tablet by mouth once daily 90 tablet 3    pantoprazole (PROTONIX) 40 mg tablet Take 1 tablet (40 mg total) by mouth daily (Patient taking differently: Take 40 mg by mouth if needed) 90 tablet 3     No current facility-administered medications for this visit         Allergies   Allergen Reactions    Oxycodone Nausea Only     Vomits from medication every time  She likes tylenol with codeine, that does not make her sick    Penicillins Rash       PAST HISTORY    Past Medical History:   Diagnosis Date    Endometriosis     GERD (gastroesophageal reflux disease)     Hyperlipidemia     Hypertension     malignant / last assessed 6/16/17    IFG (impaired fasting glucose)     last assessed 6/16/17    Stroke ([facility] [address] ) 06/2018    no residual    Sudden-onset sensorineural hearing loss 12/6/2017       Past Surgical History:   Procedure Laterality Date    BUNIONECTOMY Right 2010    COLONOSCOPY      complete    HYSTERECTOMY      total abdominal with b/l ovary removal    KNEE ARTHROSCOPY Right     menisectomy    KNEE CARTILAGE SURGERY Right     theraputic    NM OPEN TX DISTAL FIBULAR FRACTURE LAT MALLEOLUS Left 10/18/2018    Procedure: OPEN REDUCTION INTERNAL FIXATION LEFT ANKLE LATERAL MALLEOLUS;  Surgeon: Leora Long MD;  Location: [facility]  MAIN [facility];  Service: Orthopedics       Social History     Tobacco Use    Smoking status: Never Smoker    Smokeless tobacco: Never Used   Vaping Use    Vaping Use: Never used   Substance Use Topics    Alcohol use: No    Drug use: No       Family History Problem Relation Age of Onset    Early death Mother     Heart attack Mother         acute MI    Heart attack Sister     Heart disease Sister         CABG    Coronary artery disease Sister     No Known Problems Father     Diabetes Brother     Cancer Brother 47    Cancer Family         malignant neoplasm of urinary bladder    No Known Problems Daughter     No Known Problems Maternal Grandmother     No Known Problems Maternal Grandfather     No Known Problems Paternal Grandmother     No Known Problems Paternal Grandfather     Breast cancer Sister     No Known Problems Sister     No Known Problems Daughter     No Known Problems Daughter     No Known Problems Son          The following portions of the patient's history were reviewed in this encounter and updated as appropriate: Past medical, surgical, family, and social history, as well as medications, allergies, and review of systems  EXAM    Vitals:Blood pressure 139/80, pulse 69, temperature 98 7 °F (37 1 °C), resp  rate 16, height 4' 9" (1 448 m), weight 65 8 kg (145 lb), not currently breastfeeding  ,Body mass index is 31 38 kg/m²  Physical Exam  Vitals reviewed  Constitutional:       Appearance: Normal appearance  She is well-developed  HENT:      Head: Normocephalic  Eyes:      General: No scleral icterus  Cardiovascular:      Rate and Rhythm: Normal rate  Pulmonary:      Effort: Pulmonary effort is normal    Abdominal:      Palpations: Abdomen is soft  Musculoskeletal:      Cervical back: Neck supple  Skin:     General: Skin is warm and dry  Neurological:      Mental Status: She is alert and oriented to person, place, and time  GCS: GCS eye subscore is 4  GCS verbal subscore is 5  GCS motor subscore is 6  Psychiatric:         Speech: Speech normal          Behavior: Behavior normal          Neurologic Exam     Mental Status   Oriented to person, place, and time     Attention: normal    Speech: speech is normal Level of consciousness: alert    Cranial Nerves     CN VII   Facial expression full, symmetric  Motor Exam   Muscle bulk: normal  Overall muscle tone: normal  Moves all extremities, grossly normal     Gait, Coordination, and Reflexes     Tremor   Resting tremor: absent  Intention tremor: absent  Action tremor: absent  No aids  MEDICAL DECISION MAKING    Imaging Studies:     MRI brain 6/24/22: I have personally reviewed pertinent reports  and I have personally reviewed pertinent films in PACS      PLEASE NOTE:  This encounter may have been completed utilizing the [employer] Direct Speech Voice Recognition Software  Grammatical errors, random word insertions, pronoun errors and incomplete sentences are occasional consequences of the system due to software limitations, ambient noise and hardware issues  These may be missed by proof reading prior to affixing electronic signature  Any questions or concerns about the content, text or information contained within the body of this dictation should be directly addressed to the advanced practitioner or physician for clarification

## 2022-08-26 DIAGNOSIS — I10 ESSENTIAL HYPERTENSION: ICD-10-CM

## 2022-08-26 RX ORDER — AMLODIPINE BESYLATE 5 MG/1
5 TABLET ORAL EVERY EVENING
Qty: 90 TABLET | Refills: 3 | Status: SHIPPED | OUTPATIENT
Start: 2022-08-26

## 2022-10-18 ENCOUNTER — TELEPHONE (OUTPATIENT)
Dept: FAMILY MEDICINE CLINIC | Facility: CLINIC | Age: 77
End: 2022-10-18

## 2022-10-18 DIAGNOSIS — R80.9 TYPE 2 DIABETES MELLITUS WITH MICROALBUMINURIA, WITHOUT LONG-TERM CURRENT USE OF INSULIN: Primary | ICD-10-CM

## 2022-10-18 DIAGNOSIS — E11.29 TYPE 2 DIABETES MELLITUS WITH MICROALBUMINURIA, WITHOUT LONG-TERM CURRENT USE OF INSULIN: Primary | ICD-10-CM

## 2022-10-18 DIAGNOSIS — I10 ESSENTIAL HYPERTENSION: ICD-10-CM

## 2022-10-18 DIAGNOSIS — E78.2 MIXED HYPERLIPIDEMIA: ICD-10-CM

## 2022-10-25 ENCOUNTER — RA CDI HCC (OUTPATIENT)
Dept: OTHER | Facility: HOSPITAL | Age: 77
End: 2022-10-25

## 2022-10-25 NOTE — PROGRESS NOTES
[facility]  coding opportunities       Chart reviewed, no opportunity found: CHART REVIEWED, NO OPPORTUNITY FOUND     Patients Insurance     Medicare Insurance: [employer] [facility] Advantage

## 2022-11-01 ENCOUNTER — APPOINTMENT (OUTPATIENT)
Dept: LAB | Facility: CLINIC | Age: 77
End: 2022-11-01

## 2022-11-01 LAB
ALBUMIN SERPL BCP-MCNC: 3.6 G/DL (ref 3.5–5)
ALP SERPL-CCNC: 107 U/L (ref 46–116)
ALT SERPL W P-5'-P-CCNC: 57 U/L (ref 12–78)
ANION GAP SERPL CALCULATED.3IONS-SCNC: 9 MMOL/L (ref 4–13)
AST SERPL W P-5'-P-CCNC: 50 U/L (ref 5–45)
BASOPHILS # BLD AUTO: 0.06 THOUSANDS/ÂΜL (ref 0–0.1)
BASOPHILS NFR BLD AUTO: 1 % (ref 0–1)
BILIRUB SERPL-MCNC: 0.87 MG/DL (ref 0.2–1)
BUN SERPL-MCNC: 12 MG/DL (ref 5–25)
CALCIUM SERPL-MCNC: 9.4 MG/DL (ref 8.3–10.1)
CHLORIDE SERPL-SCNC: 105 MMOL/L (ref 96–108)
CHOLEST SERPL-MCNC: 180 MG/DL
CO2 SERPL-SCNC: 24 MMOL/L (ref 21–32)
CREAT SERPL-MCNC: 0.69 MG/DL (ref 0.6–1.3)
EOSINOPHIL # BLD AUTO: 0.2 THOUSAND/ÂΜL (ref 0–0.61)
EOSINOPHIL NFR BLD AUTO: 3 % (ref 0–6)
ERYTHROCYTE [DISTWIDTH] IN BLOOD BY AUTOMATED COUNT: 13.3 % (ref 11.6–15.1)
EST. AVERAGE GLUCOSE BLD GHB EST-MCNC: 197 MG/DL
GFR SERPL CREATININE-BSD FRML MDRD: 84 ML/MIN/1.73SQ M
GLUCOSE P FAST SERPL-MCNC: 221 MG/DL (ref 65–99)
HBA1C MFR BLD: 8.5 %
HCT VFR BLD AUTO: 44.5 % (ref 34.8–46.1)
HDLC SERPL-MCNC: 41 MG/DL
HGB BLD-MCNC: 14.4 G/DL (ref 11.5–15.4)
IMM GRANULOCYTES # BLD AUTO: 0.01 THOUSAND/UL (ref 0–0.2)
IMM GRANULOCYTES NFR BLD AUTO: 0 % (ref 0–2)
LDLC SERPL CALC-MCNC: 98 MG/DL (ref 0–100)
LYMPHOCYTES # BLD AUTO: 3.64 THOUSANDS/ÂΜL (ref 0.6–4.47)
LYMPHOCYTES NFR BLD AUTO: 48 % (ref 14–44)
MCH RBC QN AUTO: 29.8 PG (ref 26.8–34.3)
MCHC RBC AUTO-ENTMCNC: 32.4 G/DL (ref 31.4–37.4)
MCV RBC AUTO: 92 FL (ref 82–98)
MONOCYTES # BLD AUTO: 0.6 THOUSAND/ÂΜL (ref 0.17–1.22)
MONOCYTES NFR BLD AUTO: 8 % (ref 4–12)
NEUTROPHILS # BLD AUTO: 2.99 THOUSANDS/ÂΜL (ref 1.85–7.62)
NEUTS SEG NFR BLD AUTO: 40 % (ref 43–75)
NONHDLC SERPL-MCNC: 139 MG/DL
NRBC BLD AUTO-RTO: 0 /100 WBCS
PLATELET # BLD AUTO: 161 THOUSANDS/UL (ref 149–390)
PMV BLD AUTO: 12.5 FL (ref 8.9–12.7)
POTASSIUM SERPL-SCNC: 3.7 MMOL/L (ref 3.5–5.3)
PROT SERPL-MCNC: 8 G/DL (ref 6.4–8.4)
RBC # BLD AUTO: 4.84 MILLION/UL (ref 3.81–5.12)
SODIUM SERPL-SCNC: 138 MMOL/L (ref 135–147)
TRIGL SERPL-MCNC: 204 MG/DL
TSH SERPL DL<=0.05 MIU/L-ACNC: 2.85 UIU/ML (ref 0.45–4.5)
WBC # BLD AUTO: 7.5 THOUSAND/UL (ref 4.31–10.16)

## 2022-11-01 PROCEDURE — 85025 COMPLETE CBC W/AUTO DIFF WBC: CPT | Performed by: FAMILY MEDICINE

## 2022-11-01 PROCEDURE — 83036 HEMOGLOBIN GLYCOSYLATED A1C: CPT | Performed by: FAMILY MEDICINE

## 2022-11-01 PROCEDURE — 84443 ASSAY THYROID STIM HORMONE: CPT | Performed by: FAMILY MEDICINE

## 2022-11-01 PROCEDURE — 36415 COLL VENOUS BLD VENIPUNCTURE: CPT | Performed by: FAMILY MEDICINE

## 2022-11-01 PROCEDURE — 80061 LIPID PANEL: CPT | Performed by: FAMILY MEDICINE

## 2022-11-01 PROCEDURE — 80053 COMPREHEN METABOLIC PANEL: CPT | Performed by: FAMILY MEDICINE

## 2022-11-03 ENCOUNTER — OFFICE VISIT (OUTPATIENT)
Dept: FAMILY MEDICINE CLINIC | Facility: CLINIC | Age: 77
End: 2022-11-03

## 2022-11-03 VITALS
OXYGEN SATURATION: 97 % | TEMPERATURE: 97.6 F | WEIGHT: 147 LBS | HEART RATE: 76 BPM | RESPIRATION RATE: 16 BRPM | DIASTOLIC BLOOD PRESSURE: 72 MMHG | SYSTOLIC BLOOD PRESSURE: 120 MMHG | HEIGHT: 57 IN | BODY MASS INDEX: 31.71 KG/M2

## 2022-11-03 DIAGNOSIS — I10 ESSENTIAL HYPERTENSION: ICD-10-CM

## 2022-11-03 DIAGNOSIS — R80.9 TYPE 2 DIABETES MELLITUS WITH MICROALBUMINURIA, WITHOUT LONG-TERM CURRENT USE OF INSULIN (HCC): Primary | ICD-10-CM

## 2022-11-03 DIAGNOSIS — Z23 ENCOUNTER FOR IMMUNIZATION: ICD-10-CM

## 2022-11-03 DIAGNOSIS — Z00.00 MEDICARE ANNUAL WELLNESS VISIT, SUBSEQUENT: ICD-10-CM

## 2022-11-03 DIAGNOSIS — Z13.89 ENCOUNTER FOR SCREENING FOR OTHER DISORDER: ICD-10-CM

## 2022-11-03 DIAGNOSIS — E78.2 MIXED HYPERLIPIDEMIA: ICD-10-CM

## 2022-11-03 DIAGNOSIS — E11.29 TYPE 2 DIABETES MELLITUS WITH MICROALBUMINURIA, WITHOUT LONG-TERM CURRENT USE OF INSULIN (HCC): Primary | ICD-10-CM

## 2022-11-03 DIAGNOSIS — D32.9 MENINGIOMA (HCC): ICD-10-CM

## 2022-11-03 RX ORDER — METFORMIN HYDROCHLORIDE 500 MG/1
TABLET, EXTENDED RELEASE ORAL
Qty: 60 TABLET | Refills: 5 | Status: SHIPPED | OUTPATIENT
Start: 2022-11-03

## 2022-11-03 NOTE — PATIENT INSTRUCTIONS
Medicare Preventive Visit Patient Instructions  Thank you for completing your Welcome to Medicare Visit or Medicare Annual Wellness Visit today  Your next wellness visit will be due in one year (11/4/2023)  The screening/preventive services that you may require over the next 5-10 years are detailed below  Some tests may not apply to you based off risk factors and/or age  Screening tests ordered at today's visit but not completed yet may show as past due  Also, please note that scanned in results may not display below  Preventive Screenings:  Service Recommendations Previous Testing/Comments   Colorectal Cancer Screening  * Colonoscopy    * Fecal Occult Blood Test (FOBT)/Fecal Immunochemical Test (FIT)  * Fecal DNA/Cologuard Test  * Flexible Sigmoidoscopy Age: 39-70 years old   Colonoscopy: every 10 years (may be performed more frequently if at higher risk)  OR  FOBT/FIT: every 1 year  OR  Cologuard: every 3 years  OR  Sigmoidoscopy: every 5 years  Screening may be recommended earlier than age 39 if at higher risk for colorectal cancer  Also, an individualized decision between you and your healthcare provider will decide whether screening between the ages of 74-80 would be appropriate  Colonoscopy: 12/01/2015  FOBT/FIT: Not on file  Cologuard: Not on file  Sigmoidoscopy: Not on file          Breast Cancer Screening Age: 36 years old  Frequency: every 1-2 years  Not required if history of left and right mastectomy Mammogram: 12/23/2021        Cervical Cancer Screening Between the ages of 21-29, pap smear recommended once every 3 years  Between the ages of 33-67, can perform pap smear with HPV co-testing every 5 years     Recommendations may differ for women with a history of total hysterectomy, cervical cancer, or abnormal pap smears in past  Pap Smear: Not on file        Hepatitis C Screening Once for adults born between [facility]  More frequently in patients at high risk for Hepatitis C Hep C Antibody: Not on file        Diabetes Screening 1-2 times per year if you're at risk for diabetes or have pre-diabetes Fasting glucose: 221 mg/dL (11/1/2022)  A1C: 8 5 % (11/1/2022)      Cholesterol Screening Once every 5 years if you don't have a lipid disorder  May order more often based on risk factors  Lipid panel: 11/01/2022          Other Preventive Screenings Covered by Medicare:  1  Abdominal Aortic Aneurysm (AAA) Screening: covered once if your at risk  You're considered to be at risk if you have a family history of AAA  2  Lung Cancer Screening: covers low dose CT scan once per year if you meet all of the following conditions: (1) Age 50-69; (2) No signs or symptoms of lung cancer; (3) Current smoker or have quit smoking within the last 15 years; (4) You have a tobacco smoking history of at least 20 pack years (packs per day multiplied by number of years you smoked); (5) You get a written order from a healthcare provider  3  Glaucoma Screening: covered annually if you're considered high risk: (1) You have diabetes OR (2) Family history of glaucoma OR (3)  aged 48 and older OR (3)  American aged 72 and older  3  Osteoporosis Screening: covered every 2 years if you meet one of the following conditions: (1) You're estrogen deficient and at risk for osteoporosis based off medical history and other findings; (2) Have a vertebral abnormality; (3) On glucocorticoid therapy for more than 3 months; (4) Have primary hyperparathyroidism; (5) On osteoporosis medications and need to assess response to drug therapy  · Last bone density test (DXA Scan): 07/07/2017  5  HIV Screening: covered annually if you're between the age of 12-76  Also covered annually if you are younger than 13 and older than 72 with risk factors for HIV infection  For pregnant patients, it is covered up to 3 times per pregnancy      Immunizations:  Immunization Recommendations   Influenza Vaccine Annual influenza vaccination during flu season is recommended for all persons aged >= 6 months who do not have contraindications   Pneumococcal Vaccine   * Pneumococcal conjugate vaccine = PCV13 (Prevnar 13), PCV15 (Vaxneuvance), PCV20 (Prevnar 20)  * Pneumococcal polysaccharide vaccine = PPSV23 (Pneumovax) Adults 25-60 years old: 1-3 doses may be recommended based on certain risk factors  Adults 72 years old: 1-2 doses may be recommended based off what pneumonia vaccine you previously received   Hepatitis B Vaccine 3 dose series if at intermediate or high risk (ex: diabetes, end stage renal disease, liver disease)   Tetanus (Td) Vaccine - COST NOT COVERED BY MEDICARE PART B Following completion of primary series, a booster dose should be given every 10 years to maintain immunity against tetanus  Td may also be given as tetanus wound prophylaxis  Tdap Vaccine - COST NOT COVERED BY MEDICARE PART B Recommended at least once for all adults  For pregnant patients, recommended with each pregnancy  Shingles Vaccine (Shingrix) - COST NOT COVERED BY MEDICARE PART B  2 shot series recommended in those aged 48 and above     Health Maintenance Due:      Topic Date Due   • Breast Cancer Screening: Mammogram  12/23/2022   • Hepatitis C Screening  Addressed     Immunizations Due:      Topic Date Due   • COVID-19 Vaccine (4 - Booster for Pfizer series) 03/19/2022   • Influenza Vaccine (1) 09/01/2022     Advance Directives   What are advance directives? Advance directives are legal documents that state your wishes and plans for medical care  These plans are made ahead of time in case you lose your ability to make decisions for yourself  Advance directives can apply to any medical decision, such as the treatments you want, and if you want to donate organs  What are the types of advance directives? There are many types of advance directives, and each state has rules about how to use them   You may choose a combination of any of the following:  · Living will: This is a written record of the treatment you want  You can also choose which treatments you do not want, which to limit, and which to stop at a certain time  This includes surgery, medicine, IV fluid, and tube feedings  · Durable power of  for healthcare [place] [facility]): This is a written record that states who you want to make healthcare choices for you when you are unable to make them for yourself  This person, called a proxy, is usually a family member or a friend  You may choose more than 1 proxy  · Do not resuscitate (DNR) order:  A DNR order is used in case your heart stops beating or you stop breathing  It is a request not to have certain forms of treatment, such as CPR  A DNR order may be included in other types of advance directives  · Medical directive: This covers the care that you want if you are in a coma, near death, or unable to make decisions for yourself  You can list the treatments you want for each condition  Treatment may include pain medicine, surgery, blood transfusions, dialysis, IV or tube feedings, and a ventilator (breathing machine)  · Values history: This document has questions about your views, beliefs, and how you feel and think about life  This information can help others choose the care that you would choose  Why are advance directives important? An advance directive helps you control your care  Although spoken wishes may be used, it is better to have your wishes written down  Spoken wishes can be misunderstood, or not followed  Treatments may be given even if you do not want them  An advance directive may make it easier for your family to make difficult choices about your care  Weight Management   Why it is important to manage your weight:  Being overweight increases your risk of health conditions such as heart disease, high blood pressure, type 2 diabetes, and certain types of cancer   It can also increase your risk for osteoarthritis, sleep apnea, and other respiratory problems  Aim for a slow, steady weight loss  Even a small amount of weight loss can lower your risk of health problems  How to lose weight safely:  A safe and healthy way to lose weight is to eat fewer calories and get regular exercise  You can lose up about 1 pound a week by decreasing the number of calories you eat by 500 calories each day  Healthy meal plan for weight management:  A healthy meal plan includes a variety of foods, contains fewer calories, and helps you stay healthy  A healthy meal plan includes the following:  · Eat whole-grain foods more often  A healthy meal plan should contain fiber  Fiber is the part of grains, fruits, and vegetables that is not broken down by your body  Whole-grain foods are healthy and provide extra fiber in your diet  Some examples of whole-grain foods are whole-wheat breads and pastas, oatmeal, brown rice, and bulgur  · Eat a variety of vegetables every day  Include dark, leafy greens such as spinach, kale, dario greens, and mustard greens  Eat yellow and orange vegetables such as carrots, sweet potatoes, and winter squash  · Eat a variety of fruits every day  Choose fresh or canned fruit (canned in its own juice or light syrup) instead of juice  Fruit juice has very little or no fiber  · Eat low-fat dairy foods  Drink fat-free (skim) milk or 1% milk  Eat fat-free yogurt and low-fat cottage cheese  Try low-fat cheeses such as mozzarella and other reduced-fat cheeses  · Choose meat and other protein foods that are low in fat  Choose beans or other legumes such as split peas or lentils  Choose fish, skinless poultry (chicken or turkey), or lean cuts of red meat (beef or pork)  Before you cook meat or poultry, cut off any visible fat  · Use less fat and oil  Try baking foods instead of frying them  Add less fat, such as margarine, sour cream, regular salad dressing and mayonnaise to foods  Eat fewer high-fat foods   Some examples of high-fat foods include french fries, doughnuts, ice cream, and cakes  · Eat fewer sweets  Limit foods and drinks that are high in sugar  This includes candy, cookies, regular soda, and sweetened drinks  Exercise:  Exercise at least 30 minutes per day on most days of the week  Some examples of exercise include walking, biking, dancing, and swimming  You can also fit in more physical activity by taking the stairs instead of the elevator or parking farther away from stores  Ask your healthcare provider about the best exercise plan for you  © Copyright [employer] 2018 Information is for End User's use only and may not be sold, redistributed or otherwise used for commercial purposes  All illustrations and images included in CareNotes® are the copyrighted property of [employer]  or [facility] Preventive Visit Patient Instructions  Thank you for completing your Welcome to Medicare Visit or Medicare Annual Wellness Visit today  Your next wellness visit will be due in one year (11/4/2023)  The screening/preventive services that you may require over the next 5-10 years are detailed below  Some tests may not apply to you based off risk factors and/or age  Screening tests ordered at today's visit but not completed yet may show as past due  Also, please note that scanned in results may not display below  Preventive Screenings:  Service Recommendations Previous Testing/Comments   Colorectal Cancer Screening  * Colonoscopy    * Fecal Occult Blood Test (FOBT)/Fecal Immunochemical Test (FIT)  * Fecal DNA/Cologuard Test  * Flexible Sigmoidoscopy Age: 39-70 years old   Colonoscopy: every 10 years (may be performed more frequently if at higher risk)  OR  FOBT/FIT: every 1 year  OR  Cologuard: every 3 years  OR  Sigmoidoscopy: every 5 years  Screening may be recommended earlier than age 39 if at higher risk for colorectal cancer   Also, an individualized decision between you and your healthcare provider will decide whether screening between the ages of 74-80 would be appropriate  Colonoscopy: 12/01/2015  FOBT/FIT: Not on file  Cologuard: Not on file  Sigmoidoscopy: Not on file          Breast Cancer Screening Age: 36 years old  Frequency: every 1-2 years  Not required if history of left and right mastectomy Mammogram: 12/23/2021    Screening Current   Cervical Cancer Screening Between the ages of 21-29, pap smear recommended once every 3 years  Between the ages of 33-67, can perform pap smear with HPV co-testing every 5 years  Recommendations may differ for women with a history of total hysterectomy, cervical cancer, or abnormal pap smears in past  Pap Smear: Not on file    Screening Not Indicated   Hepatitis C Screening Once for adults born between 1945 and 1965  More frequently in patients at high risk for Hepatitis C Hep C Antibody: Not on file    Screening Current   Diabetes Screening 1-2 times per year if you're at risk for diabetes or have pre-diabetes Fasting glucose: 221 mg/dL (11/1/2022)  A1C: 8 5 % (11/1/2022)  Screening Not Indicated  History Diabetes   Cholesterol Screening Once every 5 years if you don't have a lipid disorder  May order more often based on risk factors  Lipid panel: 11/01/2022    Screening Not Indicated  History Lipid Disorder     Other Preventive Screenings Covered by Medicare:  6  Abdominal Aortic Aneurysm (AAA) Screening: covered once if your at risk  You're considered to be at risk if you have a family history of AAA  7  Lung Cancer Screening: covers low dose CT scan once per year if you meet all of the following conditions: (1) Age 50-69; (2) No signs or symptoms of lung cancer; (3) Current smoker or have quit smoking within the last 15 years; (4) You have a tobacco smoking history of at least 20 pack years (packs per day multiplied by number of years you smoked); (5) You get a written order from a healthcare provider    8  Glaucoma Screening: covered annually if you're considered high risk: (1) You have diabetes OR (2) Family history of glaucoma OR (3)  aged 48 and older OR (3)  American aged 72 and older  5  Osteoporosis Screening: covered every 2 years if you meet one of the following conditions: (1) You're estrogen deficient and at risk for osteoporosis based off medical history and other findings; (2) Have a vertebral abnormality; (3) On glucocorticoid therapy for more than 3 months; (4) Have primary hyperparathyroidism; (5) On osteoporosis medications and need to assess response to drug therapy  · Last bone density test (DXA Scan): 07/07/2017   10  HIV Screening: covered annually if you're between the age of 15-65  Also covered annually if you are younger than 13 and older than 72 with risk factors for HIV infection  For pregnant patients, it is covered up to 3 times per pregnancy  Immunizations:  Immunization Recommendations   Influenza Vaccine Annual influenza vaccination during flu season is recommended for all persons aged >= 6 months who do not have contraindications   Pneumococcal Vaccine   * Pneumococcal conjugate vaccine = PCV13 (Prevnar 13), PCV15 (Vaxneuvance), PCV20 (Prevnar 20)  * Pneumococcal polysaccharide vaccine = PPSV23 (Pneumovax) Adults 25-60 years old: 1-3 doses may be recommended based on certain risk factors  Adults 72 years old: 1-2 doses may be recommended based off what pneumonia vaccine you previously received   Hepatitis B Vaccine 3 dose series if at intermediate or high risk (ex: diabetes, end stage renal disease, liver disease)   Tetanus (Td) Vaccine - COST NOT COVERED BY MEDICARE PART B Following completion of primary series, a booster dose should be given every 10 years to maintain immunity against tetanus  Td may also be given as tetanus wound prophylaxis  Tdap Vaccine - COST NOT COVERED BY MEDICARE PART B Recommended at least once for all adults  For pregnant patients, recommended with each pregnancy     Shingles Vaccine (Shingrix) - COST NOT COVERED BY MEDICARE PART B  2 shot series recommended in those aged 48 and above     Health Maintenance Due:      Topic Date Due   • Breast Cancer Screening: Mammogram  12/23/2022   • Hepatitis C Screening  Addressed     Immunizations Due:      Topic Date Due   • COVID-19 Vaccine (4 - Booster for Pfizer series) 03/19/2022   • Influenza Vaccine (1) 09/01/2022     Advance Directives   What are advance directives? Advance directives are legal documents that state your wishes and plans for medical care  These plans are made ahead of time in case you lose your ability to make decisions for yourself  Advance directives can apply to any medical decision, such as the treatments you want, and if you want to donate organs  What are the types of advance directives? There are many types of advance directives, and each state has rules about how to use them  You may choose a combination of any of the following:  · Living will: This is a written record of the treatment you want  You can also choose which treatments you do not want, which to limit, and which to stop at a certain time  This includes surgery, medicine, IV fluid, and tube feedings  · Durable power of  for healthcare [place] [facility]): This is a written record that states who you want to make healthcare choices for you when you are unable to make them for yourself  This person, called a proxy, is usually a family member or a friend  You may choose more than 1 proxy  · Do not resuscitate (DNR) order:  A DNR order is used in case your heart stops beating or you stop breathing  It is a request not to have certain forms of treatment, such as CPR  A DNR order may be included in other types of advance directives  · Medical directive: This covers the care that you want if you are in a coma, near death, or unable to make decisions for yourself  You can list the treatments you want for each condition   Treatment may include pain medicine, surgery, blood transfusions, dialysis, IV or tube feedings, and a ventilator (breathing machine)  · Values history: This document has questions about your views, beliefs, and how you feel and think about life  This information can help others choose the care that you would choose  Why are advance directives important? An advance directive helps you control your care  Although spoken wishes may be used, it is better to have your wishes written down  Spoken wishes can be misunderstood, or not followed  Treatments may be given even if you do not want them  An advance directive may make it easier for your family to make difficult choices about your care  Fall Prevention    Fall prevention  includes ways to make your home and other areas safer  It also includes ways you can move more carefully to prevent a fall  Health conditions that cause changes in your blood pressure, vision, or muscle strength and coordination may increase your risk for falls  Medicines may also increase your risk for falls if they make you dizzy, weak, or sleepy  Fall prevention tips:   · Stand or sit up slowly  · Use assistive devices as directed  · Wear shoes that fit well and have soles that   · Wear a personal alarm  · Stay active  · Manage your medical conditions  Home Safety Tips:  · Add items to prevent falls in the bathroom  · Keep paths clear  · Install bright lights in your home  · Keep items you use often on shelves within reach  · Paint or place reflective tape on the edges of your stairs  Urinary Incontinence   Urinary incontinence (UI)  is when you lose control of your bladder  UI develops because your bladder cannot store or empty urine properly  The 3 most common types of UI are stress incontinence, urge incontinence, or both  Medicines:   · May be given to help strengthen your bladder control  Report any side effects of medication to your healthcare provider    Do pelvic muscle exercises often:  Your pelvic muscles help you stop urinating  Squeeze these muscles tight for 5 seconds, then relax for 5 seconds  Gradually work up to squeezing for 10 seconds  Do 3 sets of 15 repetitions a day, or as directed  This will help strengthen your pelvic muscles and improve bladder control  Train your bladder:  Go to the bathroom at set times, such as every 2 hours, even if you do not feel the urge to go  You can also try to hold your urine when you feel the urge to go  For example, hold your urine for 5 minutes when you feel the urge to go  As that becomes easier, hold your urine for 10 minutes  Self-care:   · Keep a UI record  Write down how often you leak urine and how much you leak  Make a note of what you were doing when you leaked urine  · Drink liquids as directed  You may need to limit the amount of liquid you drink to help control your urine leakage  Do not drink any liquid right before you go to bed  Limit or do not have drinks that contain caffeine or alcohol  · Prevent constipation  Eat a variety of high-fiber foods  Good examples are high-fiber cereals, beans, vegetables, and whole-grain breads  Walking is the best way to trigger your intestines to have a bowel movement  · Exercise regularly and maintain a healthy weight  Weight loss and exercise will decrease pressure on your bladder and help you control your leakage  · Use a catheter as directed  to help empty your bladder  A catheter is a tiny, plastic tube that is put into your bladder to drain your urine  · Go to behavior therapy as directed  Behavior therapy may be used to help you learn to control your urge to urinate  Weight Management   Why it is important to manage your weight:  Being overweight increases your risk of health conditions such as heart disease, high blood pressure, type 2 diabetes, and certain types of cancer  It can also increase your risk for osteoarthritis, sleep apnea, and other respiratory problems   Aim for a slow, steady weight loss  Even a small amount of weight loss can lower your risk of health problems  How to lose weight safely:  A safe and healthy way to lose weight is to eat fewer calories and get regular exercise  You can lose up about 1 pound a week by decreasing the number of calories you eat by 500 calories each day  Healthy meal plan for weight management:  A healthy meal plan includes a variety of foods, contains fewer calories, and helps you stay healthy  A healthy meal plan includes the following:  · Eat whole-grain foods more often  A healthy meal plan should contain fiber  Fiber is the part of grains, fruits, and vegetables that is not broken down by your body  Whole-grain foods are healthy and provide extra fiber in your diet  Some examples of whole-grain foods are whole-wheat breads and pastas, oatmeal, brown rice, and bulgur  · Eat a variety of vegetables every day  Include dark, leafy greens such as spinach, kale, dario greens, and mustard greens  Eat yellow and orange vegetables such as carrots, sweet potatoes, and winter squash  · Eat a variety of fruits every day  Choose fresh or canned fruit (canned in its own juice or light syrup) instead of juice  Fruit juice has very little or no fiber  · Eat low-fat dairy foods  Drink fat-free (skim) milk or 1% milk  Eat fat-free yogurt and low-fat cottage cheese  Try low-fat cheeses such as mozzarella and other reduced-fat cheeses  · Choose meat and other protein foods that are low in fat  Choose beans or other legumes such as split peas or lentils  Choose fish, skinless poultry (chicken or turkey), or lean cuts of red meat (beef or pork)  Before you cook meat or poultry, cut off any visible fat  · Use less fat and oil  Try baking foods instead of frying them  Add less fat, such as margarine, sour cream, regular salad dressing and mayonnaise to foods  Eat fewer high-fat foods   Some examples of high-fat foods include french fries, doughnuts, ice cream, and cakes  · Eat fewer sweets  Limit foods and drinks that are high in sugar  This includes candy, cookies, regular soda, and sweetened drinks  Exercise:  Exercise at least 30 minutes per day on most days of the week  Some examples of exercise include walking, biking, dancing, and swimming  You can also fit in more physical activity by taking the stairs instead of the elevator or parking farther away from stores  Ask your healthcare provider about the best exercise plan for you  © Copyright [employer] 2018 Information is for End User's use only and may not be sold, redistributed or otherwise used for commercial purposes  All illustrations and images included in CareNotes® are the copyrighted property of [employer]  or Ivan Kennedy     Type 2 Diabetes Management for Adults   AMBULATORY CARE:   Type 2 diabetes  is a disease that affects how your body uses glucose (sugar)  Either your body cannot make enough insulin, or it cannot use the insulin correctly  It is important to keep diabetes controlled to prevent damage to your heart, blood vessels, and other organs  Have someone call your local emergency number (911 in the [address] [facility]) if:   · You cannot be woken  · You have signs of diabetic ketoacidosis:     ? confusion, fatigue    ? vomiting    ? rapid heartbeat    ? fruity smelling breath    ? extreme thirst    ? dry mouth and skin    · You have any of the following signs of a heart attack:      ? Squeezing, pressure, or pain in your chest    ? You may  also have any of the following:     § Discomfort or pain in your back, neck, jaw, stomach, or arm    § Shortness of breath    § Nausea or vomiting    § Lightheadedness or a sudden cold sweat    · You have any of the following signs of a stroke:      ? Numbness or drooping on one side of your face     ? Weakness in an arm or leg    ? Confusion or difficulty speaking    ?  Dizziness, a severe headache, or vision loss    Call your doctor or diabetes care team if:   · You have a sore or wound that will not heal     · You have a change in the amount you urinate  · Your blood sugar levels are higher than your target goals  · You often have lower blood sugar levels than your target goals  · Your skin is red, dry, warm, or swollen  · You have trouble coping with diabetes, or you feel anxious or depressed  · You have questions or concerns about your condition or care  What you need to know about high blood sugar levels:  High blood sugar levels may not cause any symptoms  You may feel more thirsty or urinate more often than usual  Over time, high blood sugar levels can damage your nerves, blood vessels, tissues, and organs  The following can increase your blood sugar levels:  · Large meals or large amounts of carbohydrates at one time    · Less physical activity    · Stress    · Illness    · A lower dose of medicine or insulin, or a late dose    What you need to know about low blood sugar levels: You can prevent symptoms such as shakiness, dizziness, irritability, or confusion by preventing your blood sugar levels from going too low  · Treat a low blood sugar level right away  ? Drink 4 ounces of juice or have 1 tube of glucose gel  ? Check your blood sugar level again 10 to 15 minutes later  ? When the level goes back to normal, eat a meal or snack to prevent another decrease  · Keep glucose gel, raisins, or hard candy with you at all times to treat a low blood sugar level  · Your blood sugar level can get too low if you take diabetes medicine or insulin and do not eat enough food  · If you use insulin, check your blood sugar level before you exercise  ? If your blood sugar level is below 100 mg/dL, eat 4 crackers or 2 ounces of raisins, or drink 4 ounces of juice  ? Check your level every 30 minutes if you exercise more than 1 hour  ? You may need a snack during or after exercise      What you can do to manage your blood sugar levels:   · Check your blood sugar levels as directed and as needed  Several items are available to use to check your levels  You may need to check by testing a drop of blood in a glucose monitor  You may instead be given a continuous glucose monitoring (CGM) device  The device is worn at all times  The CGM checks your blood sugar level every 5 minutes  It sends results to an electronic device such as a smart phone  A CGM can be used with or without an insulin pump  Talk with your provider to find out which method is best for you  The goal for blood sugar levels before meals  is between 80 and 130 mg/dL and 2 hours after eating  is lower than 180 mg/dL  · Make healthy food choices  Work with a dietitian to develop a meal plan that works for you and your schedule  A dietitian can help you learn how to eat the right amount of carbohydrates during your meals and snacks  Carbohydrates can raise your blood sugar level if you eat too many at one time  Examples of foods that contain carbohydrates are breads, cereals, rice, pasta, and sweets  · Get regular physical activity  Physical activity can help you get to your target blood sugar level goal and manage your weight  Get at least 150 minutes of moderate to vigorous aerobic physical activity each week  Do not miss more than 2 days in a row  Do not sit longer than 30 minutes at a time  Your healthcare provider can help you create an activity plan  The plan can include the best activities for you and can help you build your strength and endurance  · Maintain a healthy weight  Ask your healthcare provider what a healthy weight is for you  Ask him or her to help you create a safe weight loss plan if you are overweight  · Take your diabetes medicine or insulin as directed  You may need diabetes medicine, insulin, or both to help control your blood sugar levels   Your healthcare provider will teach you how and when to take your diabetes medicine or insulin  You will also be taught about side effects oral diabetes medicine can cause  Insulin may be injected, or given through a pump or pen  You and your care team will discuss which method is best for you  ? An insulin pump  is an implanted device that gives your insulin 24 hours a day  An insulin pump prevents the need for multiple insulin injections in a day  ? An insulin pen  is a device prefilled with the right amount of insulin  ? You and your family members will be taught how to draw up and give insulin  if this is the best method for you  Your education team will also teach you how to dispose of needles and syringes  ? You will learn how much insulin you need  and when to give it  You will be taught when not to give insulin  You will also be taught what to do if your blood sugar level drops too low  This may happen if you take insulin and do not eat the right amount of carbohydrates  Other things you can do to manage type 2 diabetes:   · Wear medical alert identification  Wear medical alert jewelry or carry a card that says you have diabetes  Ask your provider where to get these items  · Do not smoke  Nicotine and other chemicals in cigarettes and cigars can cause lung and blood vessel damage  It also makes it more difficult to manage your diabetes  Ask your provider for information if you currently smoke and need help to quit  Do not use e-cigarettes or smokeless tobacco in place of cigarettes or to help you quit  They still contain nicotine  · Check your feet each day for cuts, scratches, calluses, or other wounds  Look for redness and swelling, and feel for warmth  Wear shoes that fit well  Check your shoes for rocks or other objects that can hurt your feet  Do not walk barefoot or wear shoes without socks  Wear cotton socks to help keep your feet dry  · Ask about vaccines you may need    You have a higher risk for serious illness if you get the flu, pneumonia, COVID-19, or hepatitis  Ask your provider if you should get vaccines to prevent these or other diseases, and when to get the vaccines  · Talk to your care team if you become stressed about diabetes care  Sometimes being able to fit diabetes care into your life can cause increased stress  The stress can cause you not to take care of yourself properly  Your care team can help by offering tips about self-care  Your care team may suggest you talk to a mental health provider  The provider can listen and offer help with self-care issues  Follow up with your doctor or diabetes care team as directed: You may need to have blood tests done before your follow-up visit  The test results will show if changes need to be made in your treatment or self-care  Write down your questions so you remember to ask them during your visits  Talk to your provider if you cannot afford your medicine  © Copyright [employer] 2022 Information is for End User's use only and may not be sold, redistributed or otherwise used for commercial purposes  All illustrations and images included in CareNotes® are the copyrighted property of [employer]  or [address] [place] [address]  The above information is an  only  It is not intended as medical advice for individual conditions or treatments  Talk to your doctor, nurse or pharmacist before following any medical regimen to see if it is safe and effective for you  Type 2 Diabetes Management for Adults   AMBULATORY CARE:   Type 2 diabetes  is a disease that affects how your body uses glucose (sugar)  Either your body cannot make enough insulin, or it cannot use the insulin correctly  It is important to keep diabetes controlled to prevent damage to your heart, blood vessels, and other organs  Have someone call your local emergency number (911 in the [address] [facility]) if:   · You cannot be woken      · You have signs of diabetic ketoacidosis:     ? confusion, fatigue    ? vomiting    ? rapid heartbeat    ? fruity smelling breath    ? extreme thirst    ? dry mouth and skin    · You have any of the following signs of a heart attack:      ? Squeezing, pressure, or pain in your chest    ? You may  also have any of the following:     § Discomfort or pain in your back, neck, jaw, stomach, or arm    § Shortness of breath    § Nausea or vomiting    § Lightheadedness or a sudden cold sweat    · You have any of the following signs of a stroke:      ? Numbness or drooping on one side of your face     ? Weakness in an arm or leg    ? Confusion or difficulty speaking    ? Dizziness, a severe headache, or vision loss    Call your doctor or diabetes care team if:   · You have a sore or wound that will not heal     · You have a change in the amount you urinate  · Your blood sugar levels are higher than your target goals  · You often have lower blood sugar levels than your target goals  · Your skin is red, dry, warm, or swollen  · You have trouble coping with diabetes, or you feel anxious or depressed  · You have questions or concerns about your condition or care  What you need to know about high blood sugar levels:  High blood sugar levels may not cause any symptoms  You may feel more thirsty or urinate more often than usual  Over time, high blood sugar levels can damage your nerves, blood vessels, tissues, and organs  The following can increase your blood sugar levels:  · Large meals or large amounts of carbohydrates at one time    · Less physical activity    · Stress    · Illness    · A lower dose of medicine or insulin, or a late dose    What you need to know about low blood sugar levels: You can prevent symptoms such as shakiness, dizziness, irritability, or confusion by preventing your blood sugar levels from going too low  · Treat a low blood sugar level right away  ? Drink 4 ounces of juice or have 1 tube of glucose gel  ?  Check your blood sugar level again 10 to 15 minutes later  ? When the level goes back to normal, eat a meal or snack to prevent another decrease  · Keep glucose gel, raisins, or hard candy with you at all times to treat a low blood sugar level  · Your blood sugar level can get too low if you take diabetes medicine or insulin and do not eat enough food  · If you use insulin, check your blood sugar level before you exercise  ? If your blood sugar level is below 100 mg/dL, eat 4 crackers or 2 ounces of raisins, or drink 4 ounces of juice  ? Check your level every 30 minutes if you exercise more than 1 hour  ? You may need a snack during or after exercise  What you can do to manage your blood sugar levels:   · Check your blood sugar levels as directed and as needed  Several items are available to use to check your levels  You may need to check by testing a drop of blood in a glucose monitor  You may instead be given a continuous glucose monitoring (CGM) device  The device is worn at all times  The CGM checks your blood sugar level every 5 minutes  It sends results to an electronic device such as a smart phone  A CGM can be used with or without an insulin pump  Talk with your provider to find out which method is best for you  The goal for blood sugar levels before meals  is between 80 and 130 mg/dL and 2 hours after eating  is lower than 180 mg/dL  · Make healthy food choices  Work with a dietitian to develop a meal plan that works for you and your schedule  A dietitian can help you learn how to eat the right amount of carbohydrates during your meals and snacks  Carbohydrates can raise your blood sugar level if you eat too many at one time  Examples of foods that contain carbohydrates are breads, cereals, rice, pasta, and sweets  · Get regular physical activity  Physical activity can help you get to your target blood sugar level goal and manage your weight   Get at least 150 minutes of moderate to vigorous aerobic physical activity each week  Do not miss more than 2 days in a row  Do not sit longer than 30 minutes at a time  Your healthcare provider can help you create an activity plan  The plan can include the best activities for you and can help you build your strength and endurance  · Maintain a healthy weight  Ask your healthcare provider what a healthy weight is for you  Ask him or her to help you create a safe weight loss plan if you are overweight  · Take your diabetes medicine or insulin as directed  You may need diabetes medicine, insulin, or both to help control your blood sugar levels  Your healthcare provider will teach you how and when to take your diabetes medicine or insulin  You will also be taught about side effects oral diabetes medicine can cause  Insulin may be injected, or given through a pump or pen  You and your care team will discuss which method is best for you  ? An insulin pump  is an implanted device that gives your insulin 24 hours a day  An insulin pump prevents the need for multiple insulin injections in a day  ? An insulin pen  is a device prefilled with the right amount of insulin  ? You and your family members will be taught how to draw up and give insulin  if this is the best method for you  Your education team will also teach you how to dispose of needles and syringes  ? You will learn how much insulin you need  and when to give it  You will be taught when not to give insulin  You will also be taught what to do if your blood sugar level drops too low  This may happen if you take insulin and do not eat the right amount of carbohydrates  Other things you can do to manage type 2 diabetes:   · Wear medical alert identification  Wear medical alert jewelry or carry a card that says you have diabetes  Ask your provider where to get these items  · Do not smoke    Nicotine and other chemicals in cigarettes and cigars can cause lung and blood vessel damage  It also makes it more difficult to manage your diabetes  Ask your provider for information if you currently smoke and need help to quit  Do not use e-cigarettes or smokeless tobacco in place of cigarettes or to help you quit  They still contain nicotine  · Check your feet each day for cuts, scratches, calluses, or other wounds  Look for redness and swelling, and feel for warmth  Wear shoes that fit well  Check your shoes for rocks or other objects that can hurt your feet  Do not walk barefoot or wear shoes without socks  Wear cotton socks to help keep your feet dry  · Ask about vaccines you may need  You have a higher risk for serious illness if you get the flu, pneumonia, COVID-19, or hepatitis  Ask your provider if you should get vaccines to prevent these or other diseases, and when to get the vaccines  · Talk to your care team if you become stressed about diabetes care  Sometimes being able to fit diabetes care into your life can cause increased stress  The stress can cause you not to take care of yourself properly  Your care team can help by offering tips about self-care  Your care team may suggest you talk to a mental health provider  The provider can listen and offer help with self-care issues  Follow up with your doctor or diabetes care team as directed: You may need to have blood tests done before your follow-up visit  The test results will show if changes need to be made in your treatment or self-care  Write down your questions so you remember to ask them during your visits  Talk to your provider if you cannot afford your medicine  © Copyright [employer] 2022 Information is for End User's use only and may not be sold, redistributed or otherwise used for commercial purposes  All illustrations and images included in CareNotes® are the copyrighted property of [employer]  or Ivan Kennedy   The above information is an  only   It is not intended as medical advice for individual conditions or treatments  Talk to your doctor, nurse or pharmacist before following any medical regimen to see if it is safe and effective for you

## 2022-11-03 NOTE — PROGRESS NOTES
Assessment and Plan:     Problem List Items Addressed This Visit        Endocrine    Type 2 diabetes mellitus with microalbuminuria, without long-term current use of insulin (HCC) - Primary    Relevant Medications    metFORMIN (GLUCOPHAGE-XR) 500 mg 24 hr tablet    Other Relevant Orders    Hemoglobin A1C    Hemoglobin A1C    Microalbumin / creatinine urine ratio       Cardiovascular and Mediastinum    Essential hypertension    Relevant Orders    Comprehensive metabolic panel    CBC and differential       Nervous and Auditory    Meningioma (HCC)       Other    Mixed hyperlipidemia    Relevant Orders    Lipid panel      Other Visit Diagnoses     Medicare annual wellness visit, subsequent        Encounter for immunization        Relevant Orders    influenza vaccine, high-dose, PF 0 7 mL (FLUZONE HIGH-DOSE) (Completed)    Encounter for screening for other disorder            Start Metformin  mg daily with dinner x 7 days then increase to 1,000 mg daily with dinner  A1c in 3 months  Watch diet  OV 6 months with labs  Flu vaccine today  COV 19 booster recommended  BMI Counseling: Body mass index is 31 81 kg/m²  The BMI is above normal  Nutrition recommendations include decreasing portion sizes, consuming healthier snacks, moderation in carbohydrate intake, reducing intake of saturated and trans fat and reducing intake of cholesterol  Exercise recommendations include exercising 3-5 times per week  No pharmacotherapy was ordered  Rationale for BMI follow-up plan is due to patient being overweight or obese  Depression Screening and Follow-up Plan: Patient was screened for depression during today's encounter  They screened negative with a PHQ-2 score of 0  Falls Plan of Care: balance, strength, and gait training instructions were provided  Urinary Incontinence Plan of Care: counseling topics discussed: limiting fluid intake 3-4 hours before bed         Preventive health issues were discussed with patient, and age appropriate screening tests were ordered as noted in patient's After Visit Summary  Personalized health advice and appropriate referrals for health education or preventive services given if needed, as noted in patient's After Visit Summary  History of Present Illness:     Patient presents for a Medicare Wellness Visit    HPI   Follow-up visit  Medications reviewed  Labs 11/2022 see note  Hypertension blood pressures have been stable on Amlodipine 5 mg daily and Metoprolol ER 50 mg daily  Creatinine 0 69  GFR 84  Electrolytes normal  Hgb 14 4  Type 2 diabetes mellitus previously diet controlled    A1c 8 5  05/2022 Urine microalbumin 20 4  Not on ACE or ARB  No neuropathy symptoms  Current with eye exam    Hyperlipidemia mixed type on Atorvastatin 40 mg daily and 1 fish oil capsule/day  Lipid profile cholesterol 180  Triglycerides 204 HDL 41  LDL 98  LFTs normal except for AST 50   TSH 2 850  Mammogram 12/2021     Recent Results (from the past 672 hour(s))   CBC and differential    Collection Time: 11/01/22  7:20 AM   Result Value Ref Range    WBC 7 50 4 31 - 10 16 Thousand/uL    RBC 4 84 3 81 - 5 12 Million/uL    Hemoglobin 14 4 11 5 - 15 4 g/dL    Hematocrit 44 5 34 8 - 46 1 %    MCV 92 82 - 98 fL    MCH 29 8 26 8 - 34 3 pg    MCHC 32 4 31 4 - 37 4 g/dL    RDW 13 3 11 6 - 15 1 %    MPV 12 5 8 9 - 12 7 fL    Platelets 468 033 - 613 Thousands/uL    nRBC 0 /100 WBCs    Neutrophils Relative 40 (L) 43 - 75 %    Immat GRANS % 0 0 - 2 %    Lymphocytes Relative 48 (H) 14 - 44 %    Monocytes Relative 8 4 - 12 %    Eosinophils Relative 3 0 - 6 %    Basophils Relative 1 0 - 1 %    Neutrophils Absolute 2 99 1 85 - 7 62 Thousands/µL    Immature Grans Absolute 0 01 0 00 - 0 20 Thousand/uL    Lymphocytes Absolute 3 64 0 60 - 4 47 Thousands/µL    Monocytes Absolute 0 60 0 17 - 1 22 Thousand/µL    Eosinophils Absolute 0 20 0 00 - 0 61 Thousand/µL    Basophils Absolute 0 06 0 00 - 0 10 Thousands/µL Comprehensive metabolic panel    Collection Time: 11/01/22  7:20 AM   Result Value Ref Range    Sodium 138 135 - 147 mmol/L    Potassium 3 7 3 5 - 5 3 mmol/L    Chloride 105 96 - 108 mmol/L    CO2 24 21 - 32 mmol/L    ANION GAP 9 4 - 13 mmol/L    BUN 12 5 - 25 mg/dL    Creatinine 0 69 0 60 - 1 30 mg/dL    Glucose, Fasting 221 (H) 65 - 99 mg/dL    Calcium 9 4 8 3 - 10 1 mg/dL    AST 50 (H) 5 - 45 U/L    ALT 57 12 - 78 U/L    Alkaline Phosphatase 107 46 - 116 U/L    Total Protein 8 0 6 4 - 8 4 g/dL    Albumin 3 6 3 5 - 5 0 g/dL    Total Bilirubin 0 87 0 20 - 1 00 mg/dL    eGFR 84 ml/min/1 73sq m   Hemoglobin A1C    Collection Time: 11/01/22  7:20 AM   Result Value Ref Range    Hemoglobin A1C 8 5 (H) Normal 3 8-5 6%; PreDiabetic 5 7-6 4%; Diabetic >=6 5%; Glycemic control for adults with diabetes <7 0% %     mg/dl   Lipid panel    Collection Time: 11/01/22  7:20 AM   Result Value Ref Range    Cholesterol 180 See Comment mg/dL    Triglycerides 204 (H) See Comment mg/dL    HDL, Direct 41 (L) >=50 mg/dL    LDL Calculated 98 0 - 100 mg/dL    Non-HDL-Chol (CHOL-HDL) 139 mg/dl   TSH, 3rd generation with Free T4 reflex    Collection Time: 11/01/22  7:20 AM   Result Value Ref Range    TSH 3RD GENERATON 2 850 0 450 - 4 500 uIU/mL             Patient Care Team:  Cristy Hsieh MD as PCP - Mee Allen MD as PCP - PCP-[facility] Attributed-MD Ariadna Cole PA-C Gretta Stamp, MD (Gastroenterology)     Review of Systems:     Review of Systems   Constitutional: Negative for appetite change, chills, fatigue, fever and unexpected weight change  HENT: Positive for hearing loss (hearing aid left ear )  Negative for congestion, ear pain, rhinorrhea, sore throat and trouble swallowing           09/2017 ENT evaluation for sudden hearing loss right ear  MRI of brain at that time showed no evidence of acoustic neuroma  Small retention cyst left maxillary sinus  1 5 cm frontal meningioma  She was seen and evaluated by neuro surgery  Eyes: Negative for visual disturbance  Respiratory: Negative for cough, shortness of breath and wheezing  Cardiovascular: Negative for chest pain, palpitations and leg swelling  ER admission 06/2017 for accelerated hypertension  07/2017 renal artery Dopplers no renal artery stenosis  07/2017 stress echocardiogram no stress-induced ischemia  No regional wall motion abnormalities  Ejection fraction 60%  Gastrointestinal: Negative for abdominal pain, blood in stool, constipation, diarrhea, nausea and vomiting  GERD stable on Pantoprazole 40 mg daily  No reflux  No dysphagia  Colonoscopy 12/2015   Endocrine:          07/2017 DEXA scan T-score -1 7 left hip consistent with low bone mineral density  On vitamin D supplement  Genitourinary: Negative for difficulty urinating  Overactive bladder with improvement on Ditropan at [facility]  Nocturia 1-2  Occasional stress incontinence  Musculoskeletal: Negative for arthralgias and myalgias  S/p left ankle fracture ORIF 10/2018  OA left knee X-rays 01/2018 showed moderate narrowing of the medial joint compartment and small joint effusion  S/p steroid injection left knee with relief  Skin: Negative for rash  Neurological: Negative for dizziness and headaches  See HEENT ROS  CNS meningioma MRI brain 06/2022 Stable left frontal meningioma  No significant mass effect or edema  Stable chronic microangiopathy  Stable chronic lacunar infarct in the right basal ganglia  On  Plavix   Hematological: Negative for adenopathy  Does not bruise/bleed easily  Psychiatric/Behavioral: Negative for dysphoric mood and sleep disturbance          Problem List:     Patient Active Problem List   Diagnosis   • Essential hypertension   • Mixed hyperlipidemia   • Gastroesophageal reflux disease   • Type 2 diabetes mellitus with microalbuminuria, without long-term current use of insulin ([facility] )   • Low bone mass   • Meningioma (HCC)   • OAB (overactive bladder)   • Primary osteoarthritis of left knee   • Rosacea   • Vitamin D deficiency   • Lacunar infarction ([facility] )   • Sensorineural hearing loss (SNHL), bilateral      Past Medical and Surgical History:     Past Medical History:   Diagnosis Date   • Endometriosis    • GERD (gastroesophageal reflux disease)    • Hyperlipidemia    • Hypertension     malignant / last assessed 6/16/17   • IFG (impaired fasting glucose)     last assessed 6/16/17   • Stroke ([facility] ) 06/2018    no residual   • Sudden-onset sensorineural hearing loss 12/6/2017     Past Surgical History:   Procedure Laterality Date   • BUNIONECTOMY Right 2010   • COLONOSCOPY      complete   • HYSTERECTOMY      total abdominal with b/l ovary removal   • KNEE ARTHROSCOPY Right     menisectomy   • KNEE CARTILAGE SURGERY Right     theraputic   • MI OPEN TX DISTAL FIBULAR FRACTURE LAT MALLEOLUS Left 10/18/2018    Procedure: OPEN REDUCTION INTERNAL FIXATION LEFT ANKLE LATERAL MALLEOLUS;  Surgeon: Benji Cristina MD;  Location: [facility]  MAIN OR;  Service: Orthopedics      Family History:     Family History   Problem Relation Age of Onset   • Early death Mother    • Heart attack Mother         acute MI   • Heart attack Sister    • Heart disease Sister         CABG   • Coronary artery disease Sister    • No Known Problems Father    • Diabetes Brother    • Cancer Brother 47   • Cancer Family         malignant neoplasm of urinary bladder   • No Known Problems Daughter    • No Known Problems Maternal Grandmother    • No Known Problems Maternal Grandfather    • No Known Problems Paternal Grandmother    • No Known Problems Paternal Grandfather    • Breast cancer Sister    • No Known Problems Sister    • No Known Problems Daughter    • No Known Problems Daughter    • No Known Problems Son       Social History:     Social History     Socioeconomic History   • Marital status:       Spouse name: None • Number of children: 4   • Years of education: less than high school   • Highest education level: None   Occupational History   • Occupation: retired   Tobacco Use   • Smoking status: Never Smoker   • Smokeless tobacco: Never Used   Vaping Use   • Vaping Use: Never used   Substance and Sexual Activity   • Alcohol use: No   • Drug use: No   • Sexual activity: Not Currently     Partners: Female     Birth control/protection: None   Other Topics Concern   • None   Social History Narrative    Daily coffee consumption: 2 cp/day    Exercising regularly    living alone     Social Determinants of Health     Financial Resource Strain: Low Risk    • Difficulty of Paying Living Expenses: Not hard at all   Food Insecurity: Not on file   Transportation Needs: No Transportation Needs   • Lack of Transportation (Medical): No   • Lack of Transportation (Non-Medical): No   Physical Activity: Not on file   Stress: Not on file   Social Connections: Not on file   Intimate Partner Violence: Not on file   Housing Stability: Not on file      Medications and Allergies:     Current Outpatient Medications   Medication Sig Dispense Refill   • acetaminophen (TYLENOL) 500 mg tablet Take 500 mg by mouth every 6 (six) hours as needed for mild pain     • amLODIPine (NORVASC) 5 mg tablet Take 1 tablet (5 mg total) by mouth every evening 90 tablet 3   • atorvastatin (LIPITOR) 40 mg tablet Take 1 tablet (40 mg total) by mouth daily 90 tablet 3   • cholecalciferol (VITAMIN D3) 1,000 units tablet Take 2,000 Units by mouth daily     • clopidogrel (PLAVIX) 75 mg tablet Take 1 tablet (75 mg total) by mouth daily 90 tablet 3   • fluticasone (FLONASE) 50 mcg/act nasal spray 2 sprays into each nostril 2 (two) times a day (Patient taking differently: 2 sprays into each nostril if needed) 18 2 mL 0   • metFORMIN (GLUCOPHAGE-XR) 500 mg 24 hr tablet 1 tablet daily with dinner x 7 days  Then increase to 2 tablets daily with dinner   60 tablet 5   • metoprolol succinate (TOPROL-XL) 50 mg 24 hr tablet Take 1 tablet (50 mg total) by mouth daily 90 tablet 3   • Multiple Vitamin (MULTIVITAMIN) tablet Take 1 tablet by mouth daily     • oxybutynin (DITROPAN-XL) 5 mg 24 hr tablet take 1 tablet by mouth once daily 90 tablet 3   • pantoprazole (PROTONIX) 40 mg tablet Take 1 tablet (40 mg total) by mouth daily (Patient taking differently: Take 40 mg by mouth if needed) 90 tablet 3     No current facility-administered medications for this visit  Allergies   Allergen Reactions   • Oxycodone Nausea Only     Vomits from medication every time  She likes tylenol with codeine, that does not make her sick   • Penicillins Rash      Immunizations:     Immunization History   Administered Date(s) Administered   • COVID-19 PFIZER VACCINE 0 3 ML IM 01/21/2021, 02/10/2021, 11/19/2021   • INFLUENZA 11/02/2021   • Influenza Split High Dose Preservative Free IM 10/16/2013, 10/22/2014, 11/09/2016   • Influenza, high dose seasonal 0 7 mL 10/10/2019, 09/16/2020, 11/03/2022   • Influenza, seasonal, injectable 09/09/2011, 10/16/2012   • Pneumococcal Conjugate 13-Valent 11/09/2016   • Pneumococcal Polysaccharide PPV23 04/22/2014   • Td (adult), adsorbed 12/08/1998   • Tdap 07/18/2016   • Zoster 08/25/2017      Health Maintenance:         Topic Date Due   • Breast Cancer Screening: Mammogram  12/23/2022   • Hepatitis C Screening  Addressed         Topic Date Due   • COVID-19 Vaccine (4 - Booster for Burrell Peter series) 03/19/2022      Medicare Screening Tests and Risk Assessments:     Nakia rUeña is here for her Subsequent Wellness visit  Last Medicare Wellness visit information reviewed, patient interviewed and updates made to the record today  Health Risk Assessment:   Patient rates overall health as very good  Patient feels that their physical health rating is much better  Patient is very satisfied with their life  Eyesight was rated as same  Hearing was rated as same   Patient feels that their emotional and mental health rating is much better  Patients states they are never, rarely angry  Patient states they are never, rarely unusually tired/fatigued  Pain experienced in the last 7 days has been none  Patient states that she has experienced no weight loss or gain in last 6 months  Depression Screening:   PHQ-2 Score: 0      Fall Risk Screening: In the past year, patient has experienced: history of falling in past year    Injured during fall?: No    Feels unsteady when standing or walking?: No    Worried about falling?: No      Urinary Incontinence Screening:   Patient has leaked urine accidently in the last six months  UI on Ditropan XL     Home Safety:  Patient does not have trouble with stairs inside or outside of their home  Patient has working smoke alarms and has working carbon monoxide detector  Home safety hazards include: none  Nutrition:   Current diet is Regular  Medications:   Patient is currently taking over-the-counter supplements  OTC medications include: see medication list  Patient is able to manage medications  Activities of Daily Living (ADLs)/Instrumental Activities of Daily Living (IADLs):   Walk and transfer into and out of bed and chair?: Yes  Dress and groom yourself?: Yes    Bathe or shower yourself?: Yes    Feed yourself?  Yes  Do your laundry/housekeeping?: Yes  Manage your money, pay your bills and track your expenses?: Yes  Make your own meals?: Yes    Do your own shopping?: Yes    Previous Hospitalizations:   Any hospitalizations or ED visits within the last 12 months?: No      Advance Care Planning:   Living will: No    Advanced directive: No      Cognitive Screening:   Provider or family/friend/caregiver concerned regarding cognition?: No    PREVENTIVE SCREENINGS      Cardiovascular Screening:    General: History Lipid Disorder and Screening Current      Diabetes Screening:     General: Screening Not Indicated and History Diabetes      Colorectal Cancer Screening: General: Risks and Benefits Discussed and Patient Declines      Breast Cancer Screening:     General: Screening Current    Due for: Mammogram        Cervical Cancer Screening:    General: Screening Not Indicated      Osteoporosis Screening:    General: Screening Current      Abdominal Aortic Aneurysm (AAA) Screening:        General: Screening Not Indicated      Lung Cancer Screening:     General: Screening Not Indicated      Hepatitis C Screening:    General: Screening Current    Screening, Brief Intervention, and Referral to Treatment (SBIRT)    Screening  Typical number of drinks in a day: 0  Typical number of drinks in a week: 0  Interpretation: Low risk drinking behavior  Brief Intervention  Alcohol & drug use screenings were reviewed  No concerns regarding substance use disorder identified  Other Counseling Topics:   Calcium and vitamin D intake and regular weightbearing exercise  No exam data present     Physical Exam:     /72   Pulse 76   Temp 97 6 °F (36 4 °C)   Resp 16   Ht 4' 9" (1 448 m)   Wt 66 7 kg (147 lb)   SpO2 97%   BMI 31 81 kg/m²     Physical Exam  Constitutional:       General: She is not in acute distress  HENT:      Right Ear: Tympanic membrane and ear canal normal       Left Ear: Tympanic membrane and ear canal normal    Eyes:      General: No scleral icterus  Extraocular Movements: Extraocular movements intact  Conjunctiva/sclera: Conjunctivae normal       Pupils: Pupils are equal, round, and reactive to light  Neck:      Vascular: No carotid bruit  Cardiovascular:      Rate and Rhythm: Normal rate and regular rhythm  Heart sounds: No murmur heard  No gallop  Pulmonary:      Effort: Pulmonary effort is normal       Breath sounds: Normal breath sounds  No wheezing or rales  Abdominal:      General: Bowel sounds are normal  There is no distension  Palpations: Abdomen is soft  There is no hepatomegaly, splenomegaly or mass        Tenderness: There is no abdominal tenderness  There is no guarding or rebound  Musculoskeletal:      Right lower leg: No edema  Left lower leg: No edema  Lymphadenopathy:      Cervical: No cervical adenopathy  Skin:     Findings: No rash  Neurological:      General: No focal deficit present  Mental Status: She is alert and oriented to person, place, and time     Psychiatric:         Mood and Affect: Mood normal           Kristina Ribeiro MD

## 2022-11-03 NOTE — PROGRESS NOTES
Assessment and Plan:     Problem List Items Addressed This Visit    None     Visit Diagnoses     Encounter for immunization    -  Primary    Relevant Orders    influenza vaccine, high-dose, PF 0 7 mL (FLUZONE HIGH-DOSE)    Medicare annual wellness visit, subsequent               Preventive health issues were discussed with patient, and age appropriate screening tests were ordered as noted in patient's After Visit Summary  Personalized health advice and appropriate referrals for health education or preventive services given if needed, as noted in patient's After Visit Summary       History of Present Illness:     Patient presents for a Medicare Wellness Visit    HPI   Patient Care Team:  Ken Wilks MD as PCP - General  Ken Wilks MD as PCP - PCP-[facility]  MD Huma Mayorga PA-C Leobardo Bury, MD (Gastroenterology)     Review of Systems:     Review of Systems     Problem List:     Patient Active Problem List   Diagnosis   • Essential hypertension   • Mixed hyperlipidemia   • Gastroesophageal reflux disease   • Type 2 diabetes mellitus with microalbuminuria, without long-term current use of insulin (HCC)   • Low bone mass   • Meningioma (HCC)   • OAB (overactive bladder)   • Primary osteoarthritis of left knee   • Rosacea   • Vitamin D deficiency   • Lacunar infarction [facility])   • Sensorineural hearing loss (SNHL), bilateral      Past Medical and Surgical History:     Past Medical History:   Diagnosis Date   • Endometriosis    • GERD (gastroesophageal reflux disease)    • Hyperlipidemia    • Hypertension     malignant / last assessed 6/16/17   • IFG (impaired fasting glucose)     last assessed 6/16/17   • Stroke ([facility] [address] ) 06/2018    no residual   • Sudden-onset sensorineural hearing loss 12/6/2017     Past Surgical History:   Procedure Laterality Date   • BUNIONECTOMY Right 2010   • COLONOSCOPY      complete   • HYSTERECTOMY      total abdominal with b/l ovary removal   • KNEE ARTHROSCOPY Right     menisectomy   • KNEE CARTILAGE SURGERY Right     theraputic   • OR OPEN TX DISTAL FIBULAR FRACTURE LAT MALLEOLUS Left 10/18/2018    Procedure: OPEN REDUCTION INTERNAL FIXATION LEFT ANKLE LATERAL MALLEOLUS;  Surgeon: Valeria Loya MD;  Location: [facility];  Service: Orthopedics      Family History:     Family History   Problem Relation Age of Onset   • Early death Mother    • Heart attack Mother         acute MI   • Heart attack Sister    • Heart disease Sister         CABG   • Coronary artery disease Sister    • No Known Problems Father    • Diabetes Brother    • Cancer Brother 47   • Cancer Family         malignant neoplasm of urinary bladder   • No Known Problems Daughter    • No Known Problems Maternal Grandmother    • No Known Problems Maternal Grandfather    • No Known Problems Paternal Grandmother    • No Known Problems Paternal Grandfather    • Breast cancer Sister    • No Known Problems Sister    • No Known Problems Daughter    • No Known Problems Daughter    • No Known Problems Son       Social History:     Social History     Socioeconomic History   • Marital status:       Spouse name: None   • Number of children: 4   • Years of education: less than high school   • Highest education level: None   Occupational History   • Occupation: retired   Tobacco Use   • Smoking status: Never Smoker   • Smokeless tobacco: Never Used   Vaping Use   • Vaping Use: Never used   Substance and Sexual Activity   • Alcohol use: No   • Drug use: No   • Sexual activity: Not Currently     Partners: Female     Birth control/protection: None   Other Topics Concern   • None   Social History Narrative    Daily coffee consumption: 2 cp/day    Exercising regularly    living alone     Social Determinants of Health     Financial Resource Strain: Not on file   Food Insecurity: Not on file   Transportation Needs: Not on file   Physical Activity: Not on file   Stress: Not on file Social Connections: Not on file   Intimate Partner Violence: Not on file   Housing Stability: Not on file      Medications and Allergies:     Current Outpatient Medications   Medication Sig Dispense Refill   • acetaminophen (TYLENOL) 500 mg tablet Take 500 mg by mouth every 6 (six) hours as needed for mild pain     • amLODIPine (NORVASC) 5 mg tablet Take 1 tablet (5 mg total) by mouth every evening 90 tablet 3   • atorvastatin (LIPITOR) 40 mg tablet Take 1 tablet (40 mg total) by mouth daily 90 tablet 3   • cholecalciferol (VITAMIN D3) 1,000 units tablet Take 2,000 Units by mouth daily     • clopidogrel (PLAVIX) 75 mg tablet Take 1 tablet (75 mg total) by mouth daily 90 tablet 3   • fluticasone (FLONASE) 50 mcg/act nasal spray 2 sprays into each nostril 2 (two) times a day (Patient taking differently: 2 sprays into each nostril if needed) 18 2 mL 0   • metoprolol succinate (TOPROL-XL) 50 mg 24 hr tablet Take 1 tablet (50 mg total) by mouth daily 90 tablet 3   • Multiple Vitamin (MULTIVITAMIN) tablet Take 1 tablet by mouth daily     • oxybutynin (DITROPAN-XL) 5 mg 24 hr tablet take 1 tablet by mouth once daily 90 tablet 3   • pantoprazole (PROTONIX) 40 mg tablet Take 1 tablet (40 mg total) by mouth daily (Patient taking differently: Take 40 mg by mouth if needed) 90 tablet 3     No current facility-administered medications for this visit       Allergies   Allergen Reactions   • Oxycodone Nausea Only     Vomits from medication every time  She likes tylenol with codeine, that does not make her sick   • Penicillins Rash      Immunizations:     Immunization History   Administered Date(s) Administered   • COVID-19 PFIZER VACCINE 0 3 ML IM 01/21/2021, 02/10/2021, 11/19/2021   • INFLUENZA 11/02/2021   • Influenza Split High Dose Preservative Free IM 10/16/2013, 10/22/2014, 11/09/2016   • Influenza, high dose seasonal 0 7 mL 10/10/2019, 09/16/2020   • Influenza, seasonal, injectable 09/09/2011, 10/16/2012   • Pneumococcal Conjugate 13-Valent 11/09/2016   • Pneumococcal Polysaccharide PPV23 04/22/2014   • Td (adult), adsorbed 12/08/1998   • Tdap 07/18/2016   • Zoster 08/25/2017      Health Maintenance:         Topic Date Due   • Breast Cancer Screening: Mammogram  12/23/2022   • Hepatitis C Screening  Addressed         Topic Date Due   • COVID-19 Vaccine (4 - Booster for Burrell Peter series) 03/19/2022   • Influenza Vaccine (1) 09/01/2022      Medicare Screening Tests and Risk Assessments:     Vincent Stockton is here for her Subsequent Wellness visit  Last Medicare Wellness visit information reviewed, patient interviewed and updates made to the record today  Health Risk Assessment:   Patient rates overall health as very good  Patient feels that their physical health rating is much better  Patient is very satisfied with their life  Eyesight was rated as same  Hearing was rated as same  Patient feels that their emotional and mental health rating is much better  Patients states they are never, rarely angry  Patient states they are never, rarely unusually tired/fatigued  Pain experienced in the last 7 days has been some  Patient's pain rating has been 1/10  Patient states that she has experienced no weight loss or gain in last 6 months  Depression Screening:   PHQ-2 Score: 0      Fall Risk Screening: In the past year, patient has experienced: history of falling in past year    Number of falls: 1  Injured during fall?: Yes    Feels unsteady when standing or walking?: No    Worried about falling?: No      Urinary Incontinence Screening:   Patient has leaked urine accidently in the last six months  Home Safety:  Patient does not have trouble with stairs inside or outside of their home  Patient has working smoke alarms and has working carbon monoxide detector  Home safety hazards include: none  Nutrition:   Current diet is Frequent junk food  Medications:   Patient is currently taking over-the-counter supplements   OTC medications include: see medication list  Patient is able to manage medications  Activities of Daily Living (ADLs)/Instrumental Activities of Daily Living (IADLs):   Walk and transfer into and out of bed and chair?: Yes  Dress and groom yourself?: Yes    Bathe or shower yourself?: Yes    Feed yourself? Yes  Do your laundry/housekeeping?: Yes  Manage your money, pay your bills and track your expenses?: Yes  Make your own meals?: Yes    Do your own shopping?: Yes    Previous Hospitalizations:   Any hospitalizations or ED visits within the last 12 months?: No      Advance Care Planning:   Living will: Yes    Advanced directive: Yes      PREVENTIVE SCREENINGS      Cardiovascular Screening:    General: Screening Not Indicated and History Lipid Disorder      Diabetes Screening:     General: Screening Not Indicated and History Diabetes      Breast Cancer Screening:     General: Screening Current      Cervical Cancer Screening:    General: Screening Not Indicated      Lung Cancer Screening:     General: Screening Not Indicated      Hepatitis C Screening:    General: Screening Current    Screening, Brief Intervention, and Referral to Treatment (SBIRT)    Screening  Typical number of drinks in a day: 0  Typical number of drinks in a week: 0  Interpretation: Low risk drinking behavior      No exam data present     Physical Exam:     /72   Pulse 76   Temp 97 6 °F (36 4 °C)   Resp 16   Ht 4' 9" (1 448 m)   Wt 66 7 kg (147 lb)   SpO2 97%   BMI 31 81 kg/m²     Physical Exam     Vibha Salcedo MD

## 2023-01-12 ENCOUNTER — NURSE TRIAGE (OUTPATIENT)
Dept: OTHER | Facility: OTHER | Age: 78
End: 2023-01-12

## 2023-01-12 NOTE — TELEPHONE ENCOUNTER
Patient with ongoing left knee pain for years, however patient states since she has been more active around the holidays the pain is getting worse  Patient denies numbness/ tingling  Patient taking otc medications for pain  Patient wants to know if PCP can give her steroid injections in her knee or if she needs a referral to ortho, please follow up  Home care advice given  Reason for Disposition  • [1] MODERATE pain (e g , interferes with normal activities, limping) AND [2] present > 3 days    Answer Assessment - Initial Assessment Questions  1  LOCATION and RADIATION: "Where is the pain located?"       Left knee    2  QUALITY: "What does the pain feel like?"  (e g , sharp, dull, aching, burning)      Aching pain    3  SEVERITY: "How bad is the pain?" "What does it keep you from doing?"   (Scale 1-10; or mild, moderate, severe)    -  MODERATE (4-7): interferes with normal activities (e g , work or school) or awakens from sleep, limping      4  ONSET: "When did the pain start?" "Does it come and go, or is it there all the time?"      Ongoing pain for years but it got worse on 1/9    5  RECURRENT: "Have you had this pain before?" If Yes, ask: "When, and what happened then?"      Yes ongoing pain    6  SETTING: "Has there been any recent work, exercise or other activity that involved that part of the body?"       More activity due to the holidays    7  AGGRAVATING FACTORS: "What makes the knee pain worse?" (e g , walking, climbing stairs, running)      Walking    8  ASSOCIATED SYMPTOMS: "Is there any swelling or redness of the knee?"      Mild swelling around knee cap    9  OTHER SYMPTOMS: "Do you have any other symptoms?" (e g , chest pain, difficulty breathing, fever, calf pain)      Denies    10   PREGNANCY: "Is there any chance you are pregnant?" "When was your last menstrual period?"        N/A    Protocols used: KNEE PAIN-ADULT-

## 2023-01-12 NOTE — TELEPHONE ENCOUNTER
Regarding: pain in left knee  ----- Message from Davon Flor sent at 1/12/2023  8:07 AM EST -----  " I have been having pain in my left knee and I wanted to know if I can come in and get a shot in it   It hurts to walk or put pressure on it "

## 2023-01-13 ENCOUNTER — PROCEDURE VISIT (OUTPATIENT)
Dept: FAMILY MEDICINE CLINIC | Facility: CLINIC | Age: 78
End: 2023-01-13

## 2023-01-13 VITALS
SYSTOLIC BLOOD PRESSURE: 118 MMHG | DIASTOLIC BLOOD PRESSURE: 72 MMHG | BODY MASS INDEX: 28.05 KG/M2 | HEART RATE: 63 BPM | TEMPERATURE: 96.3 F | WEIGHT: 130 LBS | OXYGEN SATURATION: 96 % | HEIGHT: 57 IN | RESPIRATION RATE: 18 BRPM

## 2023-01-13 DIAGNOSIS — I63.81 LACUNAR INFARCTION (HCC): ICD-10-CM

## 2023-01-13 DIAGNOSIS — E11.29 TYPE 2 DIABETES MELLITUS WITH MICROALBUMINURIA, WITHOUT LONG-TERM CURRENT USE OF INSULIN (HCC): ICD-10-CM

## 2023-01-13 DIAGNOSIS — R80.9 TYPE 2 DIABETES MELLITUS WITH MICROALBUMINURIA, WITHOUT LONG-TERM CURRENT USE OF INSULIN (HCC): ICD-10-CM

## 2023-01-13 DIAGNOSIS — Z12.31 ENCOUNTER FOR SCREENING MAMMOGRAM FOR BREAST CANCER: ICD-10-CM

## 2023-01-13 DIAGNOSIS — M17.12 PRIMARY OSTEOARTHRITIS OF LEFT KNEE: Primary | ICD-10-CM

## 2023-01-13 RX ORDER — CLOPIDOGREL BISULFATE 75 MG/1
75 TABLET ORAL DAILY
Qty: 90 TABLET | Refills: 3 | Status: SHIPPED | OUTPATIENT
Start: 2023-01-13

## 2023-01-13 RX ORDER — METHYLPREDNISOLONE ACETATE 80 MG/ML
80 INJECTION, SUSPENSION INTRA-ARTICULAR; INTRALESIONAL; INTRAMUSCULAR; SOFT TISSUE ONCE
Status: COMPLETED | OUTPATIENT
Start: 2023-01-13 | End: 2023-01-13

## 2023-01-13 RX ADMIN — METHYLPREDNISOLONE ACETATE 80 MG: 80 INJECTION, SUSPENSION INTRA-ARTICULAR; INTRALESIONAL; INTRAMUSCULAR; SOFT TISSUE at 13:20

## 2023-01-13 NOTE — PROGRESS NOTES
Name: Ronn Keenan      : 1945      MRN: [id number]  Encounter Provider: Maggie Talbot MD  Encounter Date: 2023   Encounter department: [address] [place] [address]     1  Primary osteoarthritis of left knee  -     Large joint arthrocentesis: L knee  -     methylPREDNISolone acetate (DEPO-MEDROL) injection 80 mg    2  Type 2 diabetes mellitus with microalbuminuria, without long-term current use of insulin (HCC)    3  Lacunar infarction (HCC)  -     clopidogrel (PLAVIX) 75 mg tablet; Take 1 tablet (75 mg total) by mouth daily    4  Encounter for screening mammogram for breast cancer  -     Mammo screening bilateral w 3d & cad; Future; Expected date: 2023    Continue with Extra strength Tylenol 500 mg 2 tablets up to 3 times a day, as needed Voltaren gel  A significant but separately identifiable additional service rendered during the encounter- L knee steroid injection  See procedure note  Consider visco supplementation/ repeat x rays for persistent symptoms  Subjective      Recurrent left knee pain  No recent trauma or injury  No swelling  No giving way or locking  She has been using extra strength Tylenol/Voltaren gel with relief  2018 x-rays of left knee moderate narrowing of medial joint compartment and tricompartmental hypertrophic spurring  Current medications reviewed  Type 2 diabetes mellitus 2022 A1c 8 5 increased from 7 1  Patient was started on Metformin at that time    Review of Systems   Constitutional: Positive for unexpected weight change (17 pound weight loss from 2022 with diet)  Negative for appetite change, chills and fever  Musculoskeletal: Positive for arthralgias and gait problem  Negative for joint swelling          See HPI       Current Outpatient Medications on File Prior to Visit   Medication Sig   • acetaminophen (TYLENOL) 500 mg tablet Take 500 mg by mouth every 6 (six) hours as needed for mild pain   • amLODIPine (NORVASC) 5 mg tablet Take 1 tablet (5 mg total) by mouth every evening   • atorvastatin (LIPITOR) 40 mg tablet Take 1 tablet (40 mg total) by mouth daily   • cholecalciferol (VITAMIN D3) 1,000 units tablet Take 2,000 Units by mouth daily   • fluticasone (FLONASE) 50 mcg/act nasal spray 2 sprays into each nostril 2 (two) times a day (Patient taking differently: 2 sprays into each nostril if needed)   • metFORMIN (GLUCOPHAGE-XR) 500 mg 24 hr tablet 1 tablet daily with dinner x 7 days  Then increase to 2 tablets daily with dinner  • metoprolol succinate (TOPROL-XL) 50 mg 24 hr tablet Take 1 tablet (50 mg total) by mouth daily   • Multiple Vitamin (MULTIVITAMIN) tablet Take 1 tablet by mouth daily   • oxybutynin (DITROPAN-XL) 5 mg 24 hr tablet take 1 tablet by mouth once daily   • pantoprazole (PROTONIX) 40 mg tablet Take 1 tablet (40 mg total) by mouth daily (Patient taking differently: Take 40 mg by mouth if needed)   • [DISCONTINUED] clopidogrel (PLAVIX) 75 mg tablet Take 1 tablet (75 mg total) by mouth daily       Objective     /72 (BP Location: Left arm, Patient Position: Sitting, Cuff Size: Standard)   Pulse 63   Temp (!) 96 3 °F (35 7 °C)   Resp 18   Ht 4' 9" (1 448 m)   Wt 59 kg (130 lb)   SpO2 96%   BMI 28 13 kg/m²     Wt Readings from Last 3 Encounters:   01/13/23 59 kg (130 lb)   11/03/22 66 7 kg (147 lb)   08/12/22 65 8 kg (145 lb)         Physical Exam  Constitutional:       General: She is not in acute distress  Musculoskeletal:      Left knee: Deformity (varus ) and crepitus present  No swelling, effusion, erythema or bony tenderness  Normal range of motion  Tenderness present over the medial joint line  No LCL laxity, MCL laxity, ACL laxity or PCL laxity  Normal alignment, normal meniscus and normal patellar mobility  Normal pulse  Instability Tests: Anterior drawer test negative  Posterior drawer test negative  Medial Carina test negative and lateral Carina test negative  Right lower leg: No edema  Left lower leg: No edema  Neurological:      Mental Status: She is alert  Large joint arthrocentesis: L knee  Universal Protocol:  Consent: Verbal consent obtained    Risks and benefits: risks, benefits and alternatives were discussed  Consent given by: patient  Site marked: the operative site was marked  Supporting Documentation  Indications: pain (OA left knee )   Procedure Details  Location: knee - L knee  Preparation: Betadine   Needle size: 22 G  Ultrasound guidance: no  Approach: anterolateral    Patient tolerance: patient tolerated the procedure well with no immediate complications  Dressing:  Sterile dressing applied    DepoMedrol 80 mg/ML and Lidocaine 1% 2 ML           Jonna Dickerson MD

## 2023-01-30 DIAGNOSIS — N32.81 OAB (OVERACTIVE BLADDER): ICD-10-CM

## 2023-01-30 RX ORDER — OXYBUTYNIN CHLORIDE 5 MG/1
5 TABLET, EXTENDED RELEASE ORAL DAILY
Qty: 90 TABLET | Refills: 3 | Status: SHIPPED | OUTPATIENT
Start: 2023-01-30

## 2023-02-03 ENCOUNTER — APPOINTMENT (OUTPATIENT)
Dept: LAB | Facility: CLINIC | Age: 78
End: 2023-02-03

## 2023-02-03 DIAGNOSIS — R80.9 TYPE 2 DIABETES MELLITUS WITH MICROALBUMINURIA, WITHOUT LONG-TERM CURRENT USE OF INSULIN (HCC): ICD-10-CM

## 2023-02-03 DIAGNOSIS — E11.29 TYPE 2 DIABETES MELLITUS WITH MICROALBUMINURIA, WITHOUT LONG-TERM CURRENT USE OF INSULIN (HCC): ICD-10-CM

## 2023-02-03 LAB
EST. AVERAGE GLUCOSE BLD GHB EST-MCNC: 134 MG/DL
HBA1C MFR BLD: 6.3 %

## 2023-02-23 ENCOUNTER — NURSE TRIAGE (OUTPATIENT)
Dept: OTHER | Facility: OTHER | Age: 78
End: 2023-02-23

## 2023-02-23 DIAGNOSIS — E78.2 MIXED HYPERLIPIDEMIA: Primary | ICD-10-CM

## 2023-02-23 DIAGNOSIS — E78.2 MIXED HYPERLIPIDEMIA: ICD-10-CM

## 2023-02-23 RX ORDER — ATORVASTATIN CALCIUM 40 MG/1
40 TABLET, FILM COATED ORAL DAILY
Qty: 90 TABLET | Refills: 3 | Status: SHIPPED | OUTPATIENT
Start: 2023-02-23

## 2023-02-23 RX ORDER — ATORVASTATIN CALCIUM 40 MG/1
40 TABLET, FILM COATED ORAL DAILY
Qty: 90 TABLET | Refills: 0 | Status: SHIPPED | OUTPATIENT
Start: 2023-02-23 | End: 2023-02-23 | Stop reason: SDUPTHER

## 2023-02-23 NOTE — TELEPHONE ENCOUNTER
Reason for Disposition  • Patient has refills remaining on their prescription    Answer Assessment - Initial Assessment Questions  1  DRUG NAME: "What medicine do you need to have refilled?"      Atorvastatin    2  REFILLS REMAINING: "How many refills are remaining?" (Note: The label on the medicine or pill bottle will show how many refills are remaining  If there are no refills remaining, then a renewal may be needed )      3    3  EXPIRATION DATE: "What is the expiration date?" (Note: The label states when the prescription will , and thus can no longer be refilled )      N/A    4  PRESCRIBING HCP: "Who prescribed it?" Reason: If prescribed by specialist, call should be referred to that group  PCP    5  SYMPTOMS: "Do you have any symptoms?"      Denies     6   PREGNANCY: "Is there any chance that you are pregnant?" "When was your last menstrual period?"      N/A    Protocols used: MEDICATION REFILL AND RENEWAL CALL-[facility]

## 2023-02-23 NOTE — TELEPHONE ENCOUNTER
Regarding: Atorvastatin refill  ----- Message from Elise Mayorga sent at 2/23/2023  8:02 AM EST -----  "I need a refill of my atorvastatin; I only have a pill or two left "    Atorvastatin (LIPITOR) 40 mg tablet  Take 1 tablet (40 mg total) by mouth daily  Dispense: 90 tablet  Pharmacy: [address] [place] [address] [id number]ef[address], [address] (Ph: [phone])  Verified pharmacy   [ Natasha Hatchet  Verified ordering Provider   [ Natasha Hatchet  Does patient have enough for the next 3 days?  Yes [ ] No [ Natasha Hatchet

## 2023-03-03 ENCOUNTER — HOSPITAL ENCOUNTER (OUTPATIENT)
Dept: RADIOLOGY | Facility: MEDICAL CENTER | Age: 78
Discharge: HOME | End: 2023-03-03

## 2023-03-03 VITALS — HEIGHT: 57 IN | WEIGHT: 130 LBS | BODY MASS INDEX: 28.05 KG/M2

## 2023-03-03 DIAGNOSIS — Z12.31 ENCOUNTER FOR SCREENING MAMMOGRAM FOR BREAST CANCER: ICD-10-CM

## 2023-03-03 DIAGNOSIS — Z12.31 ENCOUNTER FOR SCREENING MAMMOGRAM FOR MALIGNANT NEOPLASM OF BREAST: ICD-10-CM

## 2023-05-03 DIAGNOSIS — R80.9 TYPE 2 DIABETES MELLITUS WITH MICROALBUMINURIA, WITHOUT LONG-TERM CURRENT USE OF INSULIN (HCC): ICD-10-CM

## 2023-05-03 DIAGNOSIS — E11.29 TYPE 2 DIABETES MELLITUS WITH MICROALBUMINURIA, WITHOUT LONG-TERM CURRENT USE OF INSULIN (HCC): ICD-10-CM

## 2023-05-03 RX ORDER — METFORMIN HYDROCHLORIDE 500 MG/1
TABLET, EXTENDED RELEASE ORAL
Qty: 60 TABLET | Refills: 5 | Status: SHIPPED | OUTPATIENT
Start: 2023-05-03

## 2023-05-04 ENCOUNTER — APPOINTMENT (OUTPATIENT)
Dept: LAB | Facility: CLINIC | Age: 78
End: 2023-05-04

## 2023-05-04 LAB
ALBUMIN SERPL BCP-MCNC: 3.9 G/DL (ref 3.5–5)
ALP SERPL-CCNC: 78 U/L (ref 46–116)
ALT SERPL W P-5'-P-CCNC: 43 U/L (ref 12–78)
ANION GAP SERPL CALCULATED.3IONS-SCNC: 3 MMOL/L (ref 4–13)
AST SERPL W P-5'-P-CCNC: 39 U/L (ref 5–45)
BASOPHILS # BLD AUTO: 0.05 THOUSANDS/ΜL (ref 0–0.1)
BASOPHILS NFR BLD AUTO: 1 % (ref 0–1)
BILIRUB SERPL-MCNC: 0.51 MG/DL (ref 0.2–1)
BUN SERPL-MCNC: 21 MG/DL (ref 5–25)
CALCIUM SERPL-MCNC: 10.1 MG/DL (ref 8.3–10.1)
CHLORIDE SERPL-SCNC: 106 MMOL/L (ref 96–108)
CHOLEST SERPL-MCNC: 180 MG/DL
CO2 SERPL-SCNC: 26 MMOL/L (ref 21–32)
CREAT SERPL-MCNC: 0.8 MG/DL (ref 0.6–1.3)
CREAT UR-MCNC: 67.3 MG/DL
EOSINOPHIL # BLD AUTO: 0.23 THOUSAND/ΜL (ref 0–0.61)
EOSINOPHIL NFR BLD AUTO: 3 % (ref 0–6)
ERYTHROCYTE [DISTWIDTH] IN BLOOD BY AUTOMATED COUNT: 13.3 % (ref 11.6–15.1)
GFR SERPL CREATININE-BSD FRML MDRD: 71 ML/MIN/1.73SQ M
GLUCOSE P FAST SERPL-MCNC: 127 MG/DL (ref 65–99)
HCT VFR BLD AUTO: 43.5 % (ref 34.8–46.1)
HDLC SERPL-MCNC: 43 MG/DL
HGB BLD-MCNC: 14.2 G/DL (ref 11.5–15.4)
IMM GRANULOCYTES # BLD AUTO: 0.02 THOUSAND/UL (ref 0–0.2)
IMM GRANULOCYTES NFR BLD AUTO: 0 % (ref 0–2)
LDLC SERPL CALC-MCNC: 84 MG/DL (ref 0–100)
LYMPHOCYTES # BLD AUTO: 4.49 THOUSANDS/ΜL (ref 0.6–4.47)
LYMPHOCYTES NFR BLD AUTO: 48 % (ref 14–44)
MCH RBC QN AUTO: 31.2 PG (ref 26.8–34.3)
MCHC RBC AUTO-ENTMCNC: 32.6 G/DL (ref 31.4–37.4)
MCV RBC AUTO: 96 FL (ref 82–98)
MICROALBUMIN UR-MCNC: 20.9 MG/L (ref 0–20)
MICROALBUMIN/CREAT 24H UR: 31 MG/G CREATININE (ref 0–30)
MONOCYTES # BLD AUTO: 0.62 THOUSAND/ΜL (ref 0.17–1.22)
MONOCYTES NFR BLD AUTO: 7 % (ref 4–12)
NEUTROPHILS # BLD AUTO: 3.75 THOUSANDS/ΜL (ref 1.85–7.62)
NEUTS SEG NFR BLD AUTO: 41 % (ref 43–75)
NONHDLC SERPL-MCNC: 137 MG/DL
NRBC BLD AUTO-RTO: 0 /100 WBCS
PLATELET # BLD AUTO: 198 THOUSANDS/UL (ref 149–390)
PMV BLD AUTO: 12.5 FL (ref 8.9–12.7)
POTASSIUM SERPL-SCNC: 3.6 MMOL/L (ref 3.5–5.3)
PROT SERPL-MCNC: 7.5 G/DL (ref 6.4–8.4)
RBC # BLD AUTO: 4.55 MILLION/UL (ref 3.81–5.12)
SODIUM SERPL-SCNC: 135 MMOL/L (ref 135–147)
TRIGL SERPL-MCNC: 264 MG/DL
WBC # BLD AUTO: 9.16 THOUSAND/UL (ref 4.31–10.16)

## 2023-05-06 LAB
EST. AVERAGE GLUCOSE BLD GHB EST-MCNC: 128 MG/DL
HBA1C MFR BLD: 6.1 %

## 2023-05-09 ENCOUNTER — OFFICE VISIT (OUTPATIENT)
Dept: FAMILY MEDICINE CLINIC | Facility: CLINIC | Age: 78
End: 2023-05-09

## 2023-05-09 VITALS
BODY MASS INDEX: 28.37 KG/M2 | HEART RATE: 65 BPM | RESPIRATION RATE: 18 BRPM | SYSTOLIC BLOOD PRESSURE: 122 MMHG | TEMPERATURE: 97.2 F | HEIGHT: 57 IN | WEIGHT: 131.5 LBS | DIASTOLIC BLOOD PRESSURE: 78 MMHG | OXYGEN SATURATION: 97 %

## 2023-05-09 DIAGNOSIS — E11.29 TYPE 2 DIABETES MELLITUS WITH MICROALBUMINURIA, WITHOUT LONG-TERM CURRENT USE OF INSULIN (HCC): Primary | ICD-10-CM

## 2023-05-09 DIAGNOSIS — E78.2 MIXED HYPERLIPIDEMIA: ICD-10-CM

## 2023-05-09 DIAGNOSIS — D32.9 MENINGIOMA (HCC): ICD-10-CM

## 2023-05-09 DIAGNOSIS — I10 ESSENTIAL HYPERTENSION: ICD-10-CM

## 2023-05-09 DIAGNOSIS — R80.9 TYPE 2 DIABETES MELLITUS WITH MICROALBUMINURIA, WITHOUT LONG-TERM CURRENT USE OF INSULIN (HCC): Primary | ICD-10-CM

## 2023-05-09 DIAGNOSIS — I63.81 LACUNAR INFARCTION (HCC): ICD-10-CM

## 2023-05-09 RX ORDER — METFORMIN HYDROCHLORIDE 500 MG/1
1000 TABLET, EXTENDED RELEASE ORAL
Qty: 180 TABLET | Refills: 3 | Status: SHIPPED | OUTPATIENT
Start: 2023-05-09 | End: 2023-08-07

## 2023-05-09 NOTE — PROGRESS NOTES
Name: Ralph Nolen      : 1945      MRN: [id number]  Encounter Provider: Alessandra Le MD  Encounter Date: 2023   Encounter department: [address] [place] [address]     1  Type 2 diabetes mellitus with microalbuminuria, without long-term current use of insulin (HCC)  -     metFORMIN (GLUCOPHAGE-XR) 500 mg 24 hr tablet; Take 2 tablets (1,000 mg total) by mouth daily with dinner  -     Hemoglobin A1C    2  Essential hypertension  -     Comprehensive metabolic panel  -     CBC and differential    3  Mixed hyperlipidemia  -     Lipid panel    4  Meningioma ([address] )    5  Lacunar infarction [facility])    Continue with current medications  Office visit in 6 months with repeat labs  Subjective       Follow-up visit  Medications reviewed  Labs 2023 reviewed see note  Hypertension blood pressures have been stable on Amlodipine 5 mg daily and Metoprolol ER 50 mg daily  Creatinine 0 80  GFR 71  Electrolytes normal  Hgb 14 2  Type 2 diabetes mellitus patient started on Metformin  mg 2/day at last visit  A1c 6 1 decreased from 8 5  Urine albumin/creatinine ratio 31 normal 0 to 30  Not on ACE or ARB  No neuropathy symptoms  Last eye exam 1 year ago  Hyperlipidemia mixed type on Atorvastatin 40 mg daily and 1 fish oil capsule/day  Lipid profile cholesterol 180  Triglycerides 264 decreased from 204  HDL 43  LDL 84    LFTs normal  2022  TSH 2 850  Mammogram 2023    Recent Results (from the past 672 hour(s))   Comprehensive metabolic panel    Collection Time: 23  8:14 AM   Result Value Ref Range    Sodium 135 135 - 147 mmol/L    Potassium 3 6 3 5 - 5 3 mmol/L    Chloride 106 96 - 108 mmol/L    CO2 26 21 - 32 mmol/L    ANION GAP 3 (L) 4 - 13 mmol/L    BUN 21 5 - 25 mg/dL    Creatinine 0 80 0 60 - 1 30 mg/dL    Glucose, Fasting 127 (H) 65 - 99 mg/dL    Calcium 10 1 8 3 - 10 1 mg/dL    AST 39 5 - 45 U/L    ALT 43 12 - 78 U/L    Alkaline Phosphatase 78 46 - 116 U/L    Total Protein 7 5 6 4 - 8 4 g/dL    Albumin 3 9 3 5 - 5 0 g/dL    Total Bilirubin 0 51 0 20 - 1 00 mg/dL    eGFR 71 ml/min/1 73sq m   CBC and differential    Collection Time: 05/04/23  8:14 AM   Result Value Ref Range    WBC 9 16 4 31 - 10 16 Thousand/uL    RBC 4 55 3 81 - 5 12 Million/uL    Hemoglobin 14 2 11 5 - 15 4 g/dL    Hematocrit 43 5 34 8 - 46 1 %    MCV 96 82 - 98 fL    MCH 31 2 26 8 - 34 3 pg    MCHC 32 6 31 4 - 37 4 g/dL    RDW 13 3 11 6 - 15 1 %    MPV 12 5 8 9 - 12 7 fL    Platelets 294 270 - 756 Thousands/uL    nRBC 0 /100 WBCs    Neutrophils Relative 41 (L) 43 - 75 %    Immat GRANS % 0 0 - 2 %    Lymphocytes Relative 48 (H) 14 - 44 %    Monocytes Relative 7 4 - 12 %    Eosinophils Relative 3 0 - 6 %    Basophils Relative 1 0 - 1 %    Neutrophils Absolute 3 75 1 85 - 7 62 Thousands/µL    Immature Grans Absolute 0 02 0 00 - 0 20 Thousand/uL    Lymphocytes Absolute 4 49 (H) 0 60 - 4 47 Thousands/µL    Monocytes Absolute 0 62 0 17 - 1 22 Thousand/µL    Eosinophils Absolute 0 23 0 00 - 0 61 Thousand/µL    Basophils Absolute 0 05 0 00 - 0 10 Thousands/µL   Lipid panel    Collection Time: 05/04/23  8:14 AM   Result Value Ref Range    Cholesterol 180 See Comment mg/dL    Triglycerides 264 (H) See Comment mg/dL    HDL, Direct 43 (L) >=50 mg/dL    LDL Calculated 84 0 - 100 mg/dL    Non-HDL-Chol (CHOL-HDL) 137 mg/dl   Hemoglobin A1C    Collection Time: 05/04/23  8:14 AM   Result Value Ref Range    Hemoglobin A1C 6 1 (H) Normal 3 8-5 6%; PreDiabetic 5 7-6 4%;  Diabetic >=6 5%; Glycemic control for adults with diabetes <7 0% %     mg/dl   Microalbumin / creatinine urine ratio    Collection Time: 05/04/23  8:14 AM   Result Value Ref Range    Creatinine, Ur 67 3 mg/dL    Albumin,U,Random 20 9 (H) 0 0 - 20 0 mg/L    Albumin Creat Ratio 31 (H) 0 - 30 mg/g creatinine     Lab Results   Component Value Date    [id number] 2 850 11/01/2022       Review of Systems   Constitutional: Negative for appetite change, chills, fatigue, fever and unexpected weight change  HENT: Positive for hearing loss (hearing aid left ear )  Negative for congestion, ear pain, rhinorrhea, sore throat and trouble swallowing           09/2017 ENT evaluation for sudden hearing loss right ear  MRI of brain at that time showed no evidence of acoustic neuroma  Small retention cyst left maxillary sinus  1 5 cm frontal meningioma  She was seen and evaluated by neuro surgery  Eyes: Negative for visual disturbance  Respiratory: Negative for cough, shortness of breath and wheezing  Cardiovascular: Negative for chest pain, palpitations and leg swelling  ER admission 06/2017 for accelerated hypertension  07/2017 renal artery Dopplers no renal artery stenosis  07/2017 stress echocardiogram no stress-induced ischemia  No regional wall motion abnormalities  Ejection fraction 60%  Gastrointestinal: Negative for abdominal pain, blood in stool, constipation, diarrhea, nausea and vomiting  GERD stable on Pantoprazole 40 mg daily  No reflux  No dysphagia  Colonoscopy 12/2015   Endocrine:          07/2017 DEXA scan T-score -1 7 left hip consistent with low bone mineral density  On vitamin D supplement  Genitourinary: Negative for difficulty urinating  Overactive bladder with improvement on Ditropan at [facility]  Nocturia 1-2  Occasional stress incontinence  Musculoskeletal: Negative for arthralgias and myalgias  S/p left ankle fracture ORIF 10/2018  OA left knee X-rays 01/2018 showed moderate narrowing of the medial joint compartment and small joint effusion  S/p steroid injection left knee 01/2023 with relief  Skin: Negative for rash  Neurological: Negative for dizziness and headaches  See ALCON MATIAS  CNS meningioma MRI brain 06/2022 Stable left frontal meningioma  No significant mass effect or edema  Stable chronic microangiopathy  Stable chronic lacunar infarct in the right basal ganglia   On  Plavix Hematological: Negative for adenopathy  Does not bruise/bleed easily  Psychiatric/Behavioral: Negative for dysphoric mood and sleep disturbance  Past Medical History:   Diagnosis Date   • Endometriosis    • GERD (gastroesophageal reflux disease)    • Hyperlipidemia    • Hypertension     malignant / last assessed 6/16/17   • IFG (impaired fasting glucose)     last assessed 6/16/17   • Stroke ([facility] [address] ) 06/2018    no residual   • Sudden-onset sensorineural hearing loss 12/6/2017     Past Surgical History:   Procedure Laterality Date   • BUNIONECTOMY Right 2010   • COLONOSCOPY      complete   • HYSTERECTOMY      total abdominal with b/l ovary removal   • KNEE ARTHROSCOPY Right     menisectomy   • KNEE CARTILAGE SURGERY Right     theraputic   • RI OPEN TX DISTAL FIBULAR FRACTURE LAT MALLEOLUS Left 10/18/2018    Procedure: OPEN REDUCTION INTERNAL FIXATION LEFT ANKLE LATERAL MALLEOLUS;  Surgeon: Myron Mcneil MD;  Location: [facility]  MAIN OR;  Service: Orthopedics     Family History   Problem Relation Age of Onset   • Early death Mother    • Heart attack Mother         acute MI   • Heart attack Sister    • Heart disease Sister         CABG   • Coronary artery disease Sister    • No Known Problems Father    • Diabetes Brother    • Cancer Brother 47   • Cancer Family         malignant neoplasm of urinary bladder   • No Known Problems Daughter    • No Known Problems Maternal Grandmother    • No Known Problems Maternal Grandfather    • No Known Problems Paternal Grandmother    • No Known Problems Paternal Grandfather    • Breast cancer Sister    • No Known Problems Sister    • No Known Problems Daughter    • No Known Problems Daughter    • No Known Problems Son      Social History     Socioeconomic History   • Marital status:       Spouse name: None   • Number of children: 4   • Years of education: less than high school   • Highest education level: None   Occupational History   • Occupation: retired   Tobacco Use   • Smoking status: Never   • Smokeless tobacco: Never   Vaping Use   • Vaping Use: Never used   Substance and Sexual Activity   • Alcohol use: No   • Drug use: No   • Sexual activity: Not Currently     Partners: Female     Birth control/protection: None   Other Topics Concern   • None   Social History Narrative    Daily coffee consumption: 2 cp/day    Exercising regularly    living alone     Social Determinants of Health     Financial Resource Strain: Low Risk    • Difficulty of Paying Living Expenses: Not hard at all   Food Insecurity: Not on file   Transportation Needs: No Transportation Needs   • Lack of Transportation (Medical): No   • Lack of Transportation (Non-Medical): No   Physical Activity: Not on file   Stress: Not on file   Social Connections: Not on file   Intimate Partner Violence: Not on file   Housing Stability: Not on file     Current Outpatient Medications on File Prior to Visit   Medication Sig   • acetaminophen (TYLENOL) 500 mg tablet Take 500 mg by mouth every 6 (six) hours as needed for mild pain   • amLODIPine (NORVASC) 5 mg tablet Take 1 tablet (5 mg total) by mouth every evening   • atorvastatin (LIPITOR) 40 mg tablet Take 1 tablet (40 mg total) by mouth daily   • cholecalciferol (VITAMIN D3) 1,000 units tablet Take 2,000 Units by mouth daily   • clopidogrel (PLAVIX) 75 mg tablet Take 1 tablet (75 mg total) by mouth daily   • fluticasone (FLONASE) 50 mcg/act nasal spray 2 sprays into each nostril 2 (two) times a day (Patient taking differently: 2 sprays into each nostril if needed)   • metoprolol succinate (TOPROL-XL) 50 mg 24 hr tablet Take 1 tablet (50 mg total) by mouth daily   • Multiple Vitamin (MULTIVITAMIN) tablet Take 1 tablet by mouth daily   • pantoprazole (PROTONIX) 40 mg tablet Take 1 tablet (40 mg total) by mouth daily (Patient taking differently: Take 40 mg by mouth if needed)   • [DISCONTINUED] metFORMIN (GLUCOPHAGE-XR) 500 mg 24 hr tablet 1 tablet daily with dinner x 7 days  "Then increase to 2 tablets daily with dinner  • oxybutynin (DITROPAN-XL) 5 mg 24 hr tablet Take 1 tablet (5 mg total) by mouth daily (Patient not taking: Reported on 5/9/2023)     Allergies   Allergen Reactions   • Oxycodone Nausea Only     Vomits from medication every time  She likes tylenol with codeine, that does not make her sick   • Penicillins Rash     Immunization History   Administered Date(s) Administered   • COVID-19 PFIZER VACCINE 0 3 ML IM 01/21/2021, 02/10/2021, 11/19/2021   • INFLUENZA 11/02/2021, 11/03/2022   • Influenza Split High Dose Preservative Free IM 10/16/2013, 10/22/2014, 11/09/2016   • Influenza, high dose seasonal 0 7 mL 10/10/2019, 09/16/2020, 11/03/2022   • Influenza, seasonal, injectable 09/09/2011, 10/16/2012   • Pneumococcal Conjugate 13-Valent 11/09/2016   • Pneumococcal Polysaccharide PPV23 04/22/2014   • Td (adult), adsorbed 12/08/1998   • Tdap 07/18/2016   • Zoster 08/25/2017       Objective     /78 (BP Location: Left arm, Patient Position: Sitting, Cuff Size: Standard)   Pulse 65   Temp (!) 97 2 °F (36 2 °C)   Resp 18   Ht 4' 9\" (1 448 m)   Wt 59 6 kg (131 lb 8 oz)   SpO2 97%   BMI 28 46 kg/m²     BP Readings from Last 3 Encounters:   05/09/23 122/78   01/13/23 118/72   11/03/22 120/72     Wt Readings from Last 3 Encounters:   05/09/23 59 6 kg (131 lb 8 oz)   03/03/23 59 kg (130 lb)   01/13/23 59 kg (130 lb)       Physical Exam  Vitals and nursing note reviewed  Constitutional:       General: She is not in acute distress  Appearance: She is well-developed  HENT:      Right Ear: Tympanic membrane and ear canal normal       Left Ear: Tympanic membrane and ear canal normal    Eyes:      General: No scleral icterus  Extraocular Movements: Extraocular movements intact  Conjunctiva/sclera: Conjunctivae normal       Pupils: Pupils are equal, round, and reactive to light  Neck:      Thyroid: No thyroid mass or thyromegaly        Vascular: No carotid bruit " or JVD  Trachea: No tracheal deviation  Cardiovascular:      Rate and Rhythm: Normal rate and regular rhythm  Pulses: no weak pulses          Dorsalis pedis pulses are 2+ on the right side and 2+ on the left side  Posterior tibial pulses are 2+ on the right side and 2+ on the left side  Heart sounds: Normal heart sounds  No murmur heard  No gallop  Pulmonary:      Effort: Pulmonary effort is normal  No respiratory distress  Breath sounds: Normal breath sounds  No wheezing or rales  Musculoskeletal:      Right lower leg: No edema  Left lower leg: No edema  Feet:      Right foot:      Skin integrity: No ulcer, skin breakdown, erythema, warmth, callus or dry skin  Left foot:      Skin integrity: No ulcer, skin breakdown, erythema, warmth, callus or dry skin  Lymphadenopathy:      Cervical: No cervical adenopathy  Upper Body:      Right upper body: No supraclavicular adenopathy  Left upper body: No supraclavicular adenopathy  Skin:     Findings: No rash  Nails: There is no clubbing  Neurological:      General: No focal deficit present  Mental Status: She is alert and oriented to person, place, and time  Psychiatric:         Mood and Affect: Mood normal        Patient's shoes and socks removed  Right Foot/Ankle   Right Foot Inspection  Skin Exam: skin normal and skin intact  No dry skin, no warmth, no callus, no erythema, no maceration, no abnormal color, no pre-ulcer, no ulcer and no callus  Toe Exam: ROM and strength within normal limits  No swelling, no tenderness, erythema and  no right toe deformity    Sensory   Monofilament testing: intact    Vascular  Capillary refills: < 3 seconds  The right DP pulse is 2+  The right PT pulse is 2+  Left Foot/Ankle  Left Foot Inspection  Skin Exam: skin normal and skin intact  No dry skin, no warmth, no erythema, no maceration, normal color, no pre-ulcer, no ulcer and no callus       Toe Exam: ROM and strength within normal limits and left toe deformity (bunion L great toe )  No swelling, no tenderness and no erythema  Sensory   Monofilament testing: intact    Vascular  Capillary refills: < 3 seconds  The left DP pulse is 2+  The left PT pulse is 2+       Assign Risk Category  No deformity present  No loss of protective sensation  No weak pulses  Risk: 0      Magdalena Pyle MD

## 2023-05-15 LAB
LEFT EYE DIABETIC RETINOPATHY: NORMAL
RIGHT EYE DIABETIC RETINOPATHY: NORMAL

## 2023-05-23 ENCOUNTER — TELEPHONE (OUTPATIENT)
Dept: FAMILY MEDICINE CLINIC | Facility: CLINIC | Age: 78
End: 2023-05-23

## 2023-05-23 DIAGNOSIS — J01.00 ACUTE NON-RECURRENT MAXILLARY SINUSITIS: Primary | ICD-10-CM

## 2023-05-23 RX ORDER — AZITHROMYCIN 250 MG/1
TABLET, FILM COATED ORAL
Qty: 6 TABLET | Refills: 0 | Status: SHIPPED | OUTPATIENT
Start: 2023-05-23 | End: 2023-05-27

## 2023-05-23 NOTE — TELEPHONE ENCOUNTER
Pt states when she was here last she spoke to Dr Pacheco Ramos about sinus infection symptoms  She is not better and is blowing greenish mucous with congestion and headache  She does not use any OTC allergy meds  She does use saline nasal spray  Requesting antibiotic be sent to [facility]

## 2023-06-14 DIAGNOSIS — I10 ESSENTIAL HYPERTENSION: ICD-10-CM

## 2023-06-14 RX ORDER — METOPROLOL SUCCINATE 50 MG/1
50 TABLET, EXTENDED RELEASE ORAL DAILY
Qty: 90 TABLET | Refills: 3 | Status: SHIPPED | OUTPATIENT
Start: 2023-06-14

## 2023-07-18 DIAGNOSIS — I63.81 LACUNAR INFARCTION (HCC): ICD-10-CM

## 2023-07-18 DIAGNOSIS — I10 ESSENTIAL HYPERTENSION: ICD-10-CM

## 2023-07-19 RX ORDER — PANTOPRAZOLE SODIUM 40 MG/1
40 TABLET, DELAYED RELEASE ORAL DAILY
Qty: 90 TABLET | Refills: 3 | Status: SHIPPED | OUTPATIENT
Start: 2023-07-19

## 2023-07-19 RX ORDER — AMLODIPINE BESYLATE 5 MG/1
5 TABLET ORAL EVERY EVENING
Qty: 90 TABLET | Refills: 3 | Status: SHIPPED | OUTPATIENT
Start: 2023-07-19

## 2023-08-14 ENCOUNTER — TELEPHONE (OUTPATIENT)
Dept: FAMILY MEDICINE CLINIC | Facility: CLINIC | Age: 78
End: 2023-08-14

## 2023-08-15 ENCOUNTER — OFFICE VISIT (OUTPATIENT)
Dept: FAMILY MEDICINE CLINIC | Facility: CLINIC | Age: 78
End: 2023-08-15
Payer: COMMERCIAL

## 2023-08-15 VITALS
HEIGHT: 57 IN | SYSTOLIC BLOOD PRESSURE: 124 MMHG | OXYGEN SATURATION: 97 % | DIASTOLIC BLOOD PRESSURE: 70 MMHG | HEART RATE: 78 BPM | WEIGHT: 133 LBS | TEMPERATURE: 97.9 F | BODY MASS INDEX: 28.69 KG/M2

## 2023-08-15 DIAGNOSIS — J06.9 UPPER RESPIRATORY TRACT INFECTION, UNSPECIFIED TYPE: ICD-10-CM

## 2023-08-15 PROCEDURE — 99213 OFFICE O/P EST LOW 20 MIN: CPT | Performed by: FAMILY MEDICINE

## 2023-08-15 RX ORDER — AZITHROMYCIN 250 MG/1
TABLET, FILM COATED ORAL
Qty: 6 TABLET | Refills: 0 | Status: SHIPPED | OUTPATIENT
Start: 2023-08-15 | End: 2023-08-20

## 2023-08-15 RX ORDER — FLUTICASONE PROPIONATE 50 MCG
1 SPRAY, SUSPENSION (ML) NASAL 2 TIMES DAILY
Qty: 18.2 ML | Refills: 0 | Status: SHIPPED | OUTPATIENT
Start: 2023-08-15

## 2023-08-15 NOTE — PROGRESS NOTES
Name: Arina Tucker      : 1945      MRN: [id number]  Encounter Provider: Shady Velásquez MD  Encounter Date: 8/15/2023   Encounter department: [address] [place] [address]. Upper respiratory tract infection, unspecified type  -     dextromethorphan-guaifenesin (MUCINEX DM)  MG per 12 hr tablet; Take 1 tablet by mouth every 12 (twelve) hours for 7 days  -     fluticasone (FLONASE) 50 mcg/act nasal spray; 1 spray into each nostril 2 (two) times a day  -     azithromycin (Zithromax) 250 mg tablet; Take 2 tablets (500 mg total) by mouth daily for 1 day, THEN 1 tablet (250 mg total) daily for 4 days. Start Mucinex DM and Flonase daily. We will send an antibiotic to the pharmacy if symptoms fail to improve over the week. Avoid antibiotic if she has improvement of symptoms. Follow-up if no improvement. Subjective      Patient presents with:  Cough: Cough x 1 week, hoarsness & mucus in throat , COVID test 23 NEG    Has been taking Robitussin over-the-counter. Does have underlying acid reflux. Has not tried any Flonase. Review of Systems   Constitutional: Positive for fatigue. HENT: Positive for congestion, rhinorrhea, sinus pressure and sore throat. Respiratory: Positive for cough. Negative for shortness of breath and wheezing. Neurological: Positive for headaches.        Current Outpatient Medications on File Prior to Visit   Medication Sig   • acetaminophen (TYLENOL) 500 mg tablet Take 500 mg by mouth every 6 (six) hours as needed for mild pain   • amLODIPine (NORVASC) 5 mg tablet Take 1 tablet (5 mg total) by mouth every evening   • atorvastatin (LIPITOR) 40 mg tablet Take 1 tablet (40 mg total) by mouth daily   • cholecalciferol (VITAMIN D3) 1,000 units tablet Take 2,000 Units by mouth daily   • clopidogrel (PLAVIX) 75 mg tablet Take 1 tablet (75 mg total) by mouth daily   • metoprolol succinate (TOPROL-XL) 50 mg 24 hr tablet Take 1 tablet (50 mg total) by mouth daily   • Multiple Vitamin (MULTIVITAMIN) tablet Take 1 tablet by mouth daily   • oxybutynin (DITROPAN-XL) 5 mg 24 hr tablet Take 1 tablet (5 mg total) by mouth daily   • pantoprazole (PROTONIX) 40 mg tablet Take 1 tablet (40 mg total) by mouth daily   • [DISCONTINUED] fluticasone (FLONASE) 50 mcg/act nasal spray 2 sprays into each nostril 2 (two) times a day (Patient taking differently: 2 sprays into each nostril if needed)   • metFORMIN (GLUCOPHAGE-XR) 500 mg 24 hr tablet Take 2 tablets (1,000 mg total) by mouth daily with dinner       Objective     /70 (BP Location: Left arm, Patient Position: Sitting, Cuff Size: Standard)   Pulse 78   Temp 97.9 °F (36.6 °C)   Ht 4' 9" (1.448 m)   Wt 60.3 kg (133 lb)   SpO2 97%   BMI 28.78 kg/m²     Physical Exam  Vitals and nursing note reviewed. HENT:      Head: Normocephalic and atraumatic. Nose: Congestion present. Right Turbinates: Swollen. Left Turbinates: Swollen. Mouth/Throat:      Pharynx: Posterior oropharyngeal erythema present. No oropharyngeal exudate. Eyes:      Pupils: Pupils are equal, round, and reactive to light. Cardiovascular:      Rate and Rhythm: Normal rate and regular rhythm. Pulmonary:      Effort: Pulmonary effort is normal. No respiratory distress. Neurological:      Mental Status: She is alert.        Corey Rosario MD

## 2023-10-19 ENCOUNTER — IMMUNIZATIONS (OUTPATIENT)
Dept: FAMILY MEDICINE CLINIC | Facility: CLINIC | Age: 78
End: 2023-10-19
Payer: COMMERCIAL

## 2023-10-19 DIAGNOSIS — Z23 ENCOUNTER FOR IMMUNIZATION: Primary | ICD-10-CM

## 2023-10-19 PROCEDURE — G0008 ADMIN INFLUENZA VIRUS VAC: HCPCS | Performed by: FAMILY MEDICINE

## 2023-10-19 PROCEDURE — 90662 IIV NO PRSV INCREASED AG IM: CPT | Performed by: FAMILY MEDICINE

## 2023-11-21 ENCOUNTER — APPOINTMENT (OUTPATIENT)
Dept: LAB | Facility: CLINIC | Age: 78
End: 2023-11-21
Payer: COMMERCIAL

## 2023-11-21 LAB
ALBUMIN SERPL BCP-MCNC: 4.5 G/DL (ref 3.5–5)
ALP SERPL-CCNC: 64 U/L (ref 34–104)
ALT SERPL W P-5'-P-CCNC: 23 U/L (ref 7–52)
ANION GAP SERPL CALCULATED.3IONS-SCNC: 11 MMOL/L
AST SERPL W P-5'-P-CCNC: 27 U/L (ref 13–39)
BASOPHILS # BLD AUTO: 0.05 THOUSANDS/ÂΜL (ref 0–0.1)
BASOPHILS NFR BLD AUTO: 1 % (ref 0–1)
BILIRUB SERPL-MCNC: 0.52 MG/DL (ref 0.2–1)
BUN SERPL-MCNC: 17 MG/DL (ref 5–25)
CALCIUM SERPL-MCNC: 10.1 MG/DL (ref 8.4–10.2)
CHLORIDE SERPL-SCNC: 104 MMOL/L (ref 96–108)
CHOLEST SERPL-MCNC: 161 MG/DL
CO2 SERPL-SCNC: 26 MMOL/L (ref 21–32)
CREAT SERPL-MCNC: 0.57 MG/DL (ref 0.6–1.3)
EOSINOPHIL # BLD AUTO: 0.19 THOUSAND/ÂΜL (ref 0–0.61)
EOSINOPHIL NFR BLD AUTO: 3 % (ref 0–6)
ERYTHROCYTE [DISTWIDTH] IN BLOOD BY AUTOMATED COUNT: 13.4 % (ref 11.6–15.1)
EST. AVERAGE GLUCOSE BLD GHB EST-MCNC: 143 MG/DL
GFR SERPL CREATININE-BSD FRML MDRD: 89 ML/MIN/1.73SQ M
GLUCOSE P FAST SERPL-MCNC: 115 MG/DL (ref 65–99)
HBA1C MFR BLD: 6.6 %
HCT VFR BLD AUTO: 43.7 % (ref 34.8–46.1)
HDLC SERPL-MCNC: 49 MG/DL
HGB BLD-MCNC: 14.1 G/DL (ref 11.5–15.4)
IMM GRANULOCYTES # BLD AUTO: 0.01 THOUSAND/UL (ref 0–0.2)
IMM GRANULOCYTES NFR BLD AUTO: 0 % (ref 0–2)
LDLC SERPL CALC-MCNC: 85 MG/DL (ref 0–100)
LYMPHOCYTES # BLD AUTO: 2.69 THOUSANDS/ÂΜL (ref 0.6–4.47)
LYMPHOCYTES NFR BLD AUTO: 39 % (ref 14–44)
MCH RBC QN AUTO: 29.5 PG (ref 26.8–34.3)
MCHC RBC AUTO-ENTMCNC: 32.3 G/DL (ref 31.4–37.4)
MCV RBC AUTO: 91 FL (ref 82–98)
MONOCYTES # BLD AUTO: 0.41 THOUSAND/ÂΜL (ref 0.17–1.22)
MONOCYTES NFR BLD AUTO: 6 % (ref 4–12)
NEUTROPHILS # BLD AUTO: 3.48 THOUSANDS/ÂΜL (ref 1.85–7.62)
NEUTS SEG NFR BLD AUTO: 51 % (ref 43–75)
NONHDLC SERPL-MCNC: 112 MG/DL
NRBC BLD AUTO-RTO: 0 /100 WBCS
PLATELET # BLD AUTO: 192 THOUSANDS/UL (ref 149–390)
PMV BLD AUTO: 12.5 FL (ref 8.9–12.7)
POTASSIUM SERPL-SCNC: 3.7 MMOL/L (ref 3.5–5.3)
PROT SERPL-MCNC: 7.6 G/DL (ref 6.4–8.4)
RBC # BLD AUTO: 4.78 MILLION/UL (ref 3.81–5.12)
SODIUM SERPL-SCNC: 141 MMOL/L (ref 135–147)
TRIGL SERPL-MCNC: 133 MG/DL
WBC # BLD AUTO: 6.83 THOUSAND/UL (ref 4.31–10.16)

## 2023-11-27 ENCOUNTER — OFFICE VISIT (OUTPATIENT)
Dept: FAMILY MEDICINE CLINIC | Facility: CLINIC | Age: 78
End: 2023-11-27
Payer: COMMERCIAL

## 2023-11-27 VITALS
HEIGHT: 57 IN | SYSTOLIC BLOOD PRESSURE: 126 MMHG | WEIGHT: 132 LBS | OXYGEN SATURATION: 98 % | DIASTOLIC BLOOD PRESSURE: 74 MMHG | TEMPERATURE: 97.6 F | BODY MASS INDEX: 28.48 KG/M2 | HEART RATE: 64 BPM | RESPIRATION RATE: 16 BRPM

## 2023-11-27 DIAGNOSIS — E78.2 MIXED HYPERLIPIDEMIA: ICD-10-CM

## 2023-11-27 DIAGNOSIS — I10 ESSENTIAL HYPERTENSION: ICD-10-CM

## 2023-11-27 DIAGNOSIS — I63.81 LACUNAR INFARCTION (HCC): ICD-10-CM

## 2023-11-27 DIAGNOSIS — N32.81 OAB (OVERACTIVE BLADDER): ICD-10-CM

## 2023-11-27 DIAGNOSIS — K21.9 GASTROESOPHAGEAL REFLUX DISEASE WITHOUT ESOPHAGITIS: ICD-10-CM

## 2023-11-27 DIAGNOSIS — R80.9 TYPE 2 DIABETES MELLITUS WITH MICROALBUMINURIA, WITHOUT LONG-TERM CURRENT USE OF INSULIN: Primary | ICD-10-CM

## 2023-11-27 DIAGNOSIS — E11.29 TYPE 2 DIABETES MELLITUS WITH MICROALBUMINURIA, WITHOUT LONG-TERM CURRENT USE OF INSULIN: Primary | ICD-10-CM

## 2023-11-27 DIAGNOSIS — D32.9 MENINGIOMA (HCC): ICD-10-CM

## 2023-11-27 DIAGNOSIS — Z00.00 MEDICARE ANNUAL WELLNESS VISIT, SUBSEQUENT: ICD-10-CM

## 2023-11-27 PROCEDURE — G0439 PPPS, SUBSEQ VISIT: HCPCS | Performed by: FAMILY MEDICINE

## 2023-11-27 PROCEDURE — 99214 OFFICE O/P EST MOD 30 MIN: CPT | Performed by: FAMILY MEDICINE

## 2023-11-27 NOTE — PROGRESS NOTES
Name: Kandis Hunt      : 1945      MRN: [id number]  Encounter Provider: Efrain Loza MD  Encounter Date: 2023   Encounter department: [address] [place] [address]     1. Type 2 diabetes mellitus with microalbuminuria, without long-term current use of insulin   -     Hemoglobin A1C  -     Albumin / creatinine urine ratio    2. Essential hypertension  -     Comprehensive metabolic panel  -     CBC and differential    3. Mixed hyperlipidemia  -     Lipid panel    4. Gastroesophageal reflux disease without esophagitis    5. Meningioma ([address])    6. Lacunar infarction ([address])    7. OAB (overactive bladder)    8. Medicare annual wellness visit, subsequent    Continue with current medications. Office visit 6 months with labs. Up to date with flu vaccine. Depression Screening and Follow-up Plan: Patient was screened for depression during today's encounter. They screened negative with a PHQ-2 score of 0. Subjective     Follow-up visit for chronic medical problems. .  Medications reviewed  Labs 2023 reviewed see note. Hypertension blood pressures have been stable on Amlodipine 5 mg daily and Metoprolol ER 50 mg daily. Creatinine 0.57. GFR 89. Electrolytes normal. Hgb 15.0  Type 2 diabetes mellitus on Metformin  mg 2/day   A1c 6.6 decreased from 8.5. 2023  Urine albumin 20.9 not on ACE or ARB. No neuropathy symptoms. Current with eye exam   Hyperlipidemia mixed type on Atorvastatin 40 mg daily and 1 fish oil capsule/day. Lipid profile cholesterol 161 Triglycerides 133 decreased from 264  HDL 49. LDL 85.   LFTs normal. 2022  TSH 2.850  Mammogram 2023      Recent Results (from the past 672 hour(s))   Comprehensive metabolic panel    Collection Time: 23 10:30 AM   Result Value Ref Range    Sodium 141 135 - 147 mmol/L    Potassium 3.7 3.5 - 5.3 mmol/L    Chloride 104 96 - 108 mmol/L    CO2 26 21 - 32 mmol/L    ANION GAP 11 mmol/L    BUN 17 5 - 25 mg/dL Creatinine 0.57 (L) 0.60 - 1.30 mg/dL    Glucose, Fasting 115 (H) 65 - 99 mg/dL    Calcium 10.1 8.4 - 10.2 mg/dL    AST 27 13 - 39 U/L    ALT 23 7 - 52 U/L    Alkaline Phosphatase 64 34 - 104 U/L    Total Protein 7.6 6.4 - 8.4 g/dL    Albumin 4.5 3.5 - 5.0 g/dL    Total Bilirubin 0.52 0.20 - 1.00 mg/dL    eGFR 89 ml/min/1.73sq m   CBC and differential    Collection Time: 11/21/23 10:30 AM   Result Value Ref Range    WBC 6.83 4.31 - 10.16 Thousand/uL    RBC 4.78 3.81 - 5.12 Million/uL    Hemoglobin 14.1 11.5 - 15.4 g/dL    Hematocrit 43.7 34.8 - 46.1 %    MCV 91 82 - 98 fL    MCH 29.5 26.8 - 34.3 pg    MCHC 32.3 31.4 - 37.4 g/dL    RDW 13.4 11.6 - 15.1 %    MPV 12.5 8.9 - 12.7 fL    Platelets 518 290 - 332 Thousands/uL    nRBC 0 /100 WBCs    Neutrophils Relative 51 43 - 75 %    Immat GRANS % 0 0 - 2 %    Lymphocytes Relative 39 14 - 44 %    Monocytes Relative 6 4 - 12 %    Eosinophils Relative 3 0 - 6 %    Basophils Relative 1 0 - 1 %    Neutrophils Absolute 3.48 1.85 - 7.62 Thousands/µL    Immature Grans Absolute 0.01 0.00 - 0.20 Thousand/uL    Lymphocytes Absolute 2.69 0.60 - 4.47 Thousands/µL    Monocytes Absolute 0.41 0.17 - 1.22 Thousand/µL    Eosinophils Absolute 0.19 0.00 - 0.61 Thousand/µL    Basophils Absolute 0.05 0.00 - 0.10 Thousands/µL   Hemoglobin A1C    Collection Time: 11/21/23 10:30 AM   Result Value Ref Range    Hemoglobin A1C 6.6 (H) Normal 4.0-5.6%; PreDiabetic 5.7-6.4%;  Diabetic >=6.5%; Glycemic control for adults with diabetes <7.0% %     mg/dl   Lipid panel    Collection Time: 11/21/23 10:30 AM   Result Value Ref Range    Cholesterol 161 See Comment mg/dL    Triglycerides 133 See Comment mg/dL    HDL, Direct 49 (L) >=50 mg/dL    LDL Calculated 85 0 - 100 mg/dL    Non-HDL-Chol (CHOL-HDL) 112 mg/dl     Lab Results   Component Value Date    [id number] 2.850 11/01/2022           Review of Systems   Constitutional:  Negative for appetite change, chills, fatigue, fever and unexpected weight change. HENT:  Positive for hearing loss (hearing aid left ear ). Negative for congestion, ear pain, rhinorrhea, sore throat and trouble swallowing.          09/2017 ENT evaluation for sudden hearing loss right ear. MRI of brain at that time showed no evidence of acoustic neuroma. Small retention cyst left maxillary sinus. 1.5 cm frontal meningioma. She was seen and evaluated by neuro surgery. Eyes:  Negative for visual disturbance. Respiratory:  Negative for cough, shortness of breath and wheezing. Cardiovascular:  Negative for chest pain, palpitations and leg swelling. ER admission 06/2017 for accelerated hypertension. 07/2017 renal artery Dopplers no renal artery stenosis. 07/2017 stress echocardiogram no stress-induced ischemia. No regional wall motion abnormalities. Ejection fraction 60%. Gastrointestinal:  Negative for abdominal pain, blood in stool, constipation, diarrhea, nausea and vomiting. GERD stable on Pantoprazole 40 mg daily. No reflux. No dysphagia. Colonoscopy 12/2015   Endocrine:          07/2017 DEXA scan T-score -1.7 left hip consistent with low bone mineral density. On vitamin D supplement. Genitourinary:  Negative for difficulty urinating. Overactive bladder with improvement on Ditropan at [facility]. Nocturia 1-2. Occasional stress incontinence. Musculoskeletal:  Negative for arthralgias and myalgias. S/p left ankle fracture ORIF 10/2018. OA left knee X-rays 01/2018 showed moderate narrowing of the medial joint compartment and small joint effusion. S/p steroid injection left knee 01/2023 with relief. Skin:  Negative for rash. Neurological:  Negative for dizziness and headaches. See [facility]. CNS meningioma MRI brain 06/2022 Stable left frontal meningioma. No significant mass effect or edema. Stable chronic microangiopathy. Stable chronic lacunar infarct in the right basal ganglia.  On  Plavix   Hematological:  Negative for adenopathy. Does not bruise/bleed easily. Psychiatric/Behavioral:  Negative for dysphoric mood and sleep disturbance. Past Medical History:   Diagnosis Date    Endometriosis     GERD (gastroesophageal reflux disease)     Hyperlipidemia     Hypertension     malignant / last assessed 6/16/17    IFG (impaired fasting glucose)     last assessed 6/16/17    Stroke ([address]) 06/2018    no residual    Sudden-onset sensorineural hearing loss 12/6/2017     Past Surgical History:   Procedure Laterality Date    BUNIONECTOMY Right 2010    COLONOSCOPY      complete    HYSTERECTOMY      total abdominal with b/l ovary removal    KNEE ARTHROSCOPY Right     menisectomy    KNEE CARTILAGE SURGERY Right     theraputic    TX OPEN TX DISTAL FIBULAR FRACTURE LAT MALLEOLUS Left 10/18/2018    Procedure: OPEN REDUCTION INTERNAL FIXATION LEFT ANKLE LATERAL MALLEOLUS;  Surgeon: Jadyn Mina MD;  Location: [facility];  Service: Orthopedics     Family History   Problem Relation Age of Onset    Early death Mother     Heart attack Mother         acute MI    Heart attack Sister     Heart disease Sister         CABG    Coronary artery disease Sister     No Known Problems Father     Diabetes Brother     Cancer Brother 47    Cancer Family         malignant neoplasm of urinary bladder    No Known Problems Daughter     No Known Problems Maternal Grandmother     No Known Problems Maternal Grandfather     No Known Problems Paternal Grandmother     No Known Problems Paternal Grandfather     Breast cancer Sister     No Known Problems Sister     No Known Problems Daughter     No Known Problems Daughter     No Known Problems Son      Social History     Socioeconomic History    Marital status:       Spouse name: None    Number of children: 4    Years of education: less than high school    Highest education level: None   Occupational History    Occupation: retired   Tobacco Use    Smoking status: Never    Smokeless tobacco: Never   Vaping Use Vaping Use: Never used   Substance and Sexual Activity    Alcohol use: No    Drug use: No    Sexual activity: Not Currently     Partners: Female     Birth control/protection: None   Other Topics Concern    None   Social History Narrative    Daily coffee consumption: 2 cp/day    Exercising regularly    living alone     Social Determinants of Health     Financial Resource Strain: Low Risk  (11/3/2022)    Overall Financial Resource Strain (CARDIA)     Difficulty of Paying Living Expenses: Not hard at all   Food Insecurity: Not on file   Transportation Needs: No Transportation Needs (11/3/2022)    PRAPARE - Transportation     Lack of Transportation (Medical): No     Lack of Transportation (Non-Medical):  No   Physical Activity: Not on file   Stress: Not on file   Social Connections: Not on file   Intimate Partner Violence: Not on file   Housing Stability: Not on file     Current Outpatient Medications on File Prior to Visit   Medication Sig    acetaminophen (TYLENOL) 500 mg tablet Take 500 mg by mouth every 6 (six) hours as needed for mild pain    amLODIPine (NORVASC) 5 mg tablet Take 1 tablet (5 mg total) by mouth every evening    atorvastatin (LIPITOR) 40 mg tablet Take 1 tablet (40 mg total) by mouth daily    cholecalciferol (VITAMIN D3) 1,000 units tablet Take 2,000 Units by mouth daily    clopidogrel (PLAVIX) 75 mg tablet Take 1 tablet (75 mg total) by mouth daily    fluticasone (FLONASE) 50 mcg/act nasal spray 1 spray into each nostril 2 (two) times a day    metFORMIN (GLUCOPHAGE-XR) 500 mg 24 hr tablet Take 2 tablets (1,000 mg total) by mouth daily with dinner    metoprolol succinate (TOPROL-XL) 50 mg 24 hr tablet Take 1 tablet (50 mg total) by mouth daily    Multiple Vitamin (MULTIVITAMIN) tablet Take 1 tablet by mouth daily    oxybutynin (DITROPAN-XL) 5 mg 24 hr tablet Take 1 tablet (5 mg total) by mouth daily    pantoprazole (PROTONIX) 40 mg tablet Take 1 tablet (40 mg total) by mouth daily     Allergies Allergen Reactions    Oxycodone Nausea Only     Vomits from medication every time  She likes tylenol with codeine, that does not make her sick    Penicillins Rash     Immunization History   Administered Date(s) Administered    COVID-19 PFIZER VACCINE 0.3 ML IM 01/21/2021, 02/10/2021, 11/19/2021    INFLUENZA 11/02/2021, 11/03/2022    Influenza Split High Dose Preservative Free IM 10/16/2013, 10/22/2014, 11/09/2016    Influenza, high dose seasonal 0.7 mL 10/10/2019, 09/16/2020, 11/03/2022, 10/19/2023    Influenza, seasonal, injectable 09/09/2011, 10/16/2012    Pneumococcal Conjugate 13-Valent 11/09/2016    Pneumococcal Polysaccharide PPV23 04/22/2014    Td (adult), adsorbed 12/08/1998    Tdap 07/18/2016    Zoster 08/25/2017       Objective     /74 (BP Location: Left arm, Patient Position: Sitting, Cuff Size: Standard)   Pulse 64   Temp 97.6 °F (36.4 °C)   Resp 16   Ht 4' 9" (1.448 m)   Wt 59.9 kg (132 lb)   SpO2 98%   BMI 28.56 kg/m²      BP Readings from Last 3 Encounters:   11/27/23 126/74   08/15/23 124/70   05/09/23 122/78      Wt Readings from Last 3 Encounters:   11/27/23 59.9 kg (132 lb)   08/15/23 60.3 kg (133 lb)   05/09/23 59.6 kg (131 lb 8 oz)        Physical Exam  Vitals and nursing note reviewed. Constitutional:       General: She is not in acute distress. Appearance: She is well-developed. HENT:      Right Ear: Tympanic membrane and ear canal normal.      Left Ear: Tympanic membrane and ear canal normal.   Eyes:      General: No scleral icterus. Extraocular Movements: Extraocular movements intact. Conjunctiva/sclera: Conjunctivae normal.      Pupils: Pupils are equal, round, and reactive to light. Neck:      Thyroid: No thyroid mass or thyromegaly. Vascular: No carotid bruit or JVD. Trachea: No tracheal deviation. Cardiovascular:      Rate and Rhythm: Normal rate and regular rhythm.       Pulses: no weak pulses          Dorsalis pedis pulses are 2+ on the right side and 2+ on the left side. Posterior tibial pulses are 2+ on the right side and 2+ on the left side. Heart sounds: Normal heart sounds. No murmur heard. No gallop. Pulmonary:      Effort: Pulmonary effort is normal. No respiratory distress. Breath sounds: Normal breath sounds. No wheezing or rales. Musculoskeletal:      Right lower leg: No edema. Left lower leg: No edema. Feet:      Right foot:      Skin integrity: No ulcer, skin breakdown, erythema, warmth, callus or dry skin. Left foot:      Skin integrity: No ulcer, skin breakdown, erythema, warmth, callus or dry skin. Lymphadenopathy:      Cervical: No cervical adenopathy. Upper Body:      Right upper body: No supraclavicular adenopathy. Left upper body: No supraclavicular adenopathy. Skin:     Findings: No rash. Nails: There is no clubbing. Neurological:      General: No focal deficit present. Mental Status: She is alert and oriented to person, place, and time. Psychiatric:         Mood and Affect: Mood normal.         Behavior: Behavior normal.         Cognition and Memory: Cognition normal.       Patient's shoes and socks removed. Right Foot/Ankle   Right Foot Inspection  Skin Exam: skin normal and skin intact. No dry skin, no warmth, no callus, no erythema, no maceration, no abnormal color, no pre-ulcer, no ulcer and no callus. Toe Exam: ROM and strength within normal limits. No swelling, no tenderness, erythema and  no right toe deformity    Sensory   Monofilament testing: intact    Vascular  Capillary refills: < 3 seconds  The right DP pulse is 2+. The right PT pulse is 2+. Left Foot/Ankle  Left Foot Inspection  Skin Exam: skin normal and skin intact. No dry skin, no warmth, no erythema, no maceration, normal color, no pre-ulcer, no ulcer and no callus. Toe Exam: ROM and strength within normal limits. No swelling, no tenderness, no erythema and no left toe deformity. Sensory   Monofilament testing: intact    Vascular  Capillary refills: < 3 seconds  The left DP pulse is 2+. The left PT pulse is 2+.      Assign Risk Category  No deformity present  No loss of protective sensation  No weak pulses  Risk: 0     Hazel Swenson MD

## 2023-11-27 NOTE — PATIENT INSTRUCTIONS
Medicare Preventive Visit Patient Instructions  Thank you for completing your Welcome to Medicare Visit or Medicare Annual Wellness Visit today. Your next wellness visit will be due in one year (11/27/2024). The screening/preventive services that you may require over the next 5-10 years are detailed below. Some tests may not apply to you based off risk factors and/or age. Screening tests ordered at today's visit but not completed yet may show as past due. Also, please note that scanned in results may not display below. Preventive Screenings:  Service Recommendations Previous Testing/Comments   Colorectal Cancer Screening  * Colonoscopy    * Fecal Occult Blood Test (FOBT)/Fecal Immunochemical Test (FIT)  * Fecal DNA/Cologuard Test  * Flexible Sigmoidoscopy Age: 43-73 years old   Colonoscopy: every 10 years (may be performed more frequently if at higher risk)  OR  FOBT/FIT: every 1 year  OR  Cologuard: every 3 years  OR  Sigmoidoscopy: every 5 years  Screening may be recommended earlier than age 39 if at higher risk for colorectal cancer. Also, an individualized decision between you and your healthcare provider will decide whether screening between the ages of 77-80 would be appropriate. Colonoscopy: 12/01/2015  FOBT/FIT: Not on file  Cologuard: Not on file  Sigmoidoscopy: Not on file          Breast Cancer Screening Age: 36 years old  Frequency: every 1-2 years  Not required if history of left and right mastectomy Mammogram: 03/03/2023    Screening Current   Cervical Cancer Screening Between the ages of 21-29, pap smear recommended once every 3 years. Between the ages of 32-69, can perform pap smear with HPV co-testing every 5 years.    Recommendations may differ for women with a history of total hysterectomy, cervical cancer, or abnormal pap smears in past. Pap Smear: Not on file    Screening Not Indicated   Hepatitis C Screening Once for adults born between 1945 and 1965  More frequently in patients at high risk for Hepatitis C Hep C Antibody: Not on file    Screening Current   Diabetes Screening 1-2 times per year if you're at risk for diabetes or have pre-diabetes Fasting glucose: 115 mg/dL (11/21/2023)  A1C: 6.6 % (11/21/2023)  Screening Not Indicated  History Diabetes   Cholesterol Screening Once every 5 years if you don't have a lipid disorder. May order more often based on risk factors. Lipid panel: 11/21/2023    Screening Not Indicated  History Lipid Disorder     Other Preventive Screenings Covered by Medicare:  Abdominal Aortic Aneurysm (AAA) Screening: covered once if your at risk. You're considered to be at risk if you have a family history of AAA. Lung Cancer Screening: covers low dose CT scan once per year if you meet all of the following conditions: (1) Age 48-67; (2) No signs or symptoms of lung cancer; (3) Current smoker or have quit smoking within the last 15 years; (4) You have a tobacco smoking history of at least 20 pack years (packs per day multiplied by number of years you smoked); (5) You get a written order from a healthcare provider. Glaucoma Screening: covered annually if you're considered high risk: (1) You have diabetes OR (2) Family history of glaucoma OR (3)  aged 48 and older OR (3)  American aged 72 and older  Osteoporosis Screening: covered every 2 years if you meet one of the following conditions: (1) You're estrogen deficient and at risk for osteoporosis based off medical history and other findings; (2) Have a vertebral abnormality; (3) On glucocorticoid therapy for more than 3 months; (4) Have primary hyperparathyroidism; (5) On osteoporosis medications and need to assess response to drug therapy. Last bone density test (DXA Scan): 07/07/2017. HIV Screening: covered annually if you're between the age of 14-79. Also covered annually if you are younger than 13 and older than 72 with risk factors for HIV infection.  For pregnant patients, it is covered up to 3 times per pregnancy. Immunizations:  Immunization Recommendations   Influenza Vaccine Annual influenza vaccination during flu season is recommended for all persons aged >= 6 months who do not have contraindications   Pneumococcal Vaccine   * Pneumococcal conjugate vaccine = PCV13 (Prevnar 13), PCV15 (Vaxneuvance), PCV20 (Prevnar 20)  * Pneumococcal polysaccharide vaccine = PPSV23 (Pneumovax) Adults 91-50 yo with certain risk factors or if 69+ yo  If never received any pneumonia vaccine: recommend Prevnar 20 (PCV20)  Give PCV20 if previously received 1 dose of PCV13 or PPSV23   Hepatitis B Vaccine 3 dose series if at intermediate or high risk (ex: diabetes, end stage renal disease, liver disease)   Respiratory syncytial virus (RSV) Vaccine - COVERED BY MEDICARE PART D  * RSVPreF3 (Arexvy) CDC recommends that adults 61years of age and older may receive a single dose of RSV vaccine using shared clinical decision-making (SCDM)   Tetanus (Td) Vaccine - COST NOT COVERED BY MEDICARE PART B Following completion of primary series, a booster dose should be given every 10 years to maintain immunity against tetanus. Td may also be given as tetanus wound prophylaxis. Tdap Vaccine - COST NOT COVERED BY MEDICARE PART B Recommended at least once for all adults. For pregnant patients, recommended with each pregnancy. Shingles Vaccine (Shingrix) - COST NOT COVERED BY MEDICARE PART B  2 shot series recommended in those 19 years and older who have or will have weakened immune systems or those 50 years and older     Health Maintenance Due:      Topic Date Due   • Breast Cancer Screening: Mammogram  03/03/2024   • Hepatitis C Screening  Addressed   • Colorectal Cancer Screening  Discontinued     Immunizations Due:      Topic Date Due   • COVID-19 Vaccine (4 - Pfizer series) 01/14/2022     Advance Directives   What are advance directives? Advance directives are legal documents that state your wishes and plans for medical care. These plans are made ahead of time in case you lose your ability to make decisions for yourself. Advance directives can apply to any medical decision, such as the treatments you want, and if you want to donate organs. What are the types of advance directives? There are many types of advance directives, and each state has rules about how to use them. You may choose a combination of any of the following:  Living will: This is a written record of the treatment you want. You can also choose which treatments you do not want, which to limit, and which to stop at a certain time. This includes surgery, medicine, IV fluid, and tube feedings. Durable power of  for healthcare [facility]): This is a written record that states who you want to make healthcare choices for you when you are unable to make them for yourself. This person, called a proxy, is usually a family member or a friend. You may choose more than 1 proxy. Do not resuscitate (DNR) order:  A DNR order is used in case your heart stops beating or you stop breathing. It is a request not to have certain forms of treatment, such as CPR. A DNR order may be included in other types of advance directives. Medical directive: This covers the care that you want if you are in a coma, near death, or unable to make decisions for yourself. You can list the treatments you want for each condition. Treatment may include pain medicine, surgery, blood transfusions, dialysis, IV or tube feedings, and a ventilator (breathing machine). Values history: This document has questions about your views, beliefs, and how you feel and think about life. This information can help others choose the care that you would choose. Why are advance directives important? An advance directive helps you control your care. Although spoken wishes may be used, it is better to have your wishes written down. Spoken wishes can be misunderstood, or not followed.  Treatments may be given even if you do not want them. An advance directive may make it easier for your family to make difficult choices about your care. Urinary Incontinence   Urinary incontinence (UI)  is when you lose control of your bladder. UI develops because your bladder cannot store or empty urine properly. The 3 most common types of UI are stress incontinence, urge incontinence, or both. Medicines:   May be given to help strengthen your bladder control. Report any side effects of medication to your healthcare provider. Do pelvic muscle exercises often:  Your pelvic muscles help you stop urinating. Squeeze these muscles tight for 5 seconds, then relax for 5 seconds. Gradually work up to squeezing for 10 seconds. Do 3 sets of 15 repetitions a day, or as directed. This will help strengthen your pelvic muscles and improve bladder control. Train your bladder:  Go to the bathroom at set times, such as every 2 hours, even if you do not feel the urge to go. You can also try to hold your urine when you feel the urge to go. For example, hold your urine for 5 minutes when you feel the urge to go. As that becomes easier, hold your urine for 10 minutes. Self-care:   Keep a UI record. Write down how often you leak urine and how much you leak. Make a note of what you were doing when you leaked urine. Drink liquids as directed. You may need to limit the amount of liquid you drink to help control your urine leakage. Do not drink any liquid right before you go to bed. Limit or do not have drinks that contain caffeine or alcohol. Prevent constipation. Eat a variety of high-fiber foods. Good examples are high-fiber cereals, beans, vegetables, and whole-grain breads. Walking is the best way to trigger your intestines to have a bowel movement. Exercise regularly and maintain a healthy weight. Weight loss and exercise will decrease pressure on your bladder and help you control your leakage. Use a catheter as directed  to help empty your bladder.  A catheter is a tiny, plastic tube that is put into your bladder to drain your urine. Go to behavior therapy as directed. Behavior therapy may be used to help you learn to control your urge to urinate. Weight Management   Why it is important to manage your weight:  Being overweight increases your risk of health conditions such as heart disease, high blood pressure, type 2 diabetes, and certain types of cancer. It can also increase your risk for osteoarthritis, sleep apnea, and other respiratory problems. Aim for a slow, steady weight loss. Even a small amount of weight loss can lower your risk of health problems. How to lose weight safely:  A safe and healthy way to lose weight is to eat fewer calories and get regular exercise. You can lose up about 1 pound a week by decreasing the number of calories you eat by 500 calories each day. Healthy meal plan for weight management:  A healthy meal plan includes a variety of foods, contains fewer calories, and helps you stay healthy. A healthy meal plan includes the following:  Eat whole-grain foods more often. A healthy meal plan should contain fiber. Fiber is the part of grains, fruits, and vegetables that is not broken down by your body. Whole-grain foods are healthy and provide extra fiber in your diet. Some examples of whole-grain foods are whole-wheat breads and pastas, oatmeal, brown rice, and bulgur. Eat a variety of vegetables every day. Include dark, leafy greens such as spinach, kale, dario greens, and mustard greens. Eat yellow and orange vegetables such as carrots, sweet potatoes, and winter squash. Eat a variety of fruits every day. Choose fresh or canned fruit (canned in its own juice or light syrup) instead of juice. Fruit juice has very little or no fiber. Eat low-fat dairy foods. Drink fat-free (skim) milk or 1% milk. Eat fat-free yogurt and low-fat cottage cheese.  Try low-fat cheeses such as mozzarella and other reduced-fat cheeses. Choose meat and other protein foods that are low in fat. Choose beans or other legumes such as split peas or lentils. Choose fish, skinless poultry (chicken or turkey), or lean cuts of red meat (beef or pork). Before you cook meat or poultry, cut off any visible fat. Use less fat and oil. Try baking foods instead of frying them. Add less fat, such as margarine, sour cream, regular salad dressing and mayonnaise to foods. Eat fewer high-fat foods. Some examples of high-fat foods include french fries, doughnuts, ice cream, and cakes. Eat fewer sweets. Limit foods and drinks that are high in sugar. This includes candy, cookies, regular soda, and sweetened drinks. Exercise:  Exercise at least 30 minutes per day on most days of the week. Some examples of exercise include walking, biking, dancing, and swimming. You can also fit in more physical activity by taking the stairs instead of the elevator or parking farther away from stores. Ask your healthcare provider about the best exercise plan for you. © Copyright [employer] 2018 Information is for End User's use only and may not be sold, redistributed or otherwise used for commercial purposes.  All illustrations and images included in CareNotes® are the copyrighted property of [employer] or [address] [place] [address]

## 2023-11-27 NOTE — PROGRESS NOTES
Assessment and Plan:     Problem List Items Addressed This Visit          Digestive    Gastroesophageal reflux disease       Endocrine    Type 2 diabetes mellitus with microalbuminuria, without long-term current use of insulin  - Primary    Relevant Orders    Hemoglobin A1C    Albumin / creatinine urine ratio       Cardiovascular and Mediastinum    Essential hypertension    Relevant Orders    Comprehensive metabolic panel    CBC and differential       Nervous and Auditory    Meningioma (HCC)    Lacunar infarction (HCC)       Genitourinary    OAB (overactive bladder)       Other    Mixed hyperlipidemia    Relevant Orders    Lipid panel     Other Visit Diagnoses       Medicare annual wellness visit, subsequent              BMI Counseling: Body mass index is 28.56 kg/m². The BMI is above normal. Nutrition recommendations include decreasing portion sizes, consuming healthier snacks, moderation in carbohydrate intake, reducing intake of saturated and trans fat and reducing intake of cholesterol. Exercise recommendations include exercising 3-5 times per week. No pharmacotherapy was ordered. Rationale for BMI follow-up plan is due to patient being overweight or obese. Depression Screening and Follow-up Plan: Patient was screened for depression during today's encounter. They screened negative with a PHQ-2 score of 0. Urinary Incontinence Plan of Care: counseling topics discussed: limiting fluid intake 3-4 hours before bed. Preventive health issues were discussed with patient, and age appropriate screening tests were ordered as noted in patient's After Visit Summary. Personalized health advice and appropriate referrals for health education or preventive services given if needed, as noted in patient's After Visit Summary.      History of Present Illness:     Patient presents for a Medicare Wellness Visit    HPI   Patient Care Team:  Tahir Nye MD as PCP - Wes Nelson MD as PCP - PCP-[facility] [employer] Attributed-Roster  MD Yoly Ann PA-C Zerita Marten, MD (Gastroenterology)     Review of Systems:     Review of Systems     Problem List:     Patient Active Problem List   Diagnosis    Essential hypertension    Mixed hyperlipidemia    Gastroesophageal reflux disease    Type 2 diabetes mellitus with microalbuminuria, without long-term current use of insulin     Low bone mass    Meningioma (HCC)    OAB (overactive bladder)    Primary osteoarthritis of left knee    Rosacea    Vitamin D deficiency    Lacunar infarction ([address])    Sensorineural hearing loss (SNHL), bilateral      Past Medical and Surgical History:     Past Medical History:   Diagnosis Date    Endometriosis     GERD (gastroesophageal reflux disease)     Hyperlipidemia     Hypertension     malignant / last assessed 6/16/17    IFG (impaired fasting glucose)     last assessed 6/16/17    Stroke ([address]) 06/2018    no residual    Sudden-onset sensorineural hearing loss 12/6/2017     Past Surgical History:   Procedure Laterality Date    BUNIONECTOMY Right 2010    COLONOSCOPY      complete    HYSTERECTOMY      total abdominal with b/l ovary removal    KNEE ARTHROSCOPY Right     menisectomy    KNEE CARTILAGE SURGERY Right     theraputic    AR OPEN TX DISTAL FIBULAR FRACTURE LAT MALLEOLUS Left 10/18/2018    Procedure: OPEN REDUCTION INTERNAL FIXATION LEFT ANKLE LATERAL MALLEOLUS;  Surgeon: Deepak Mcrae MD;  [facility];  Service: Orthopedics      Family History:     Family History   Problem Relation Age of Onset    Early death Mother     Heart attack Mother         acute MI    Heart attack Sister     Heart disease Sister         CABG    Coronary artery disease Sister     No Known Problems Father     Diabetes Brother     Cancer Brother 47    Cancer Family         malignant neoplasm of urinary bladder    No Known Problems Daughter     No Known Problems Maternal Grandmother     No Known Problems Maternal Grandfather     No Known Problems Paternal Grandmother     No Known Problems Paternal Grandfather     Breast cancer Sister     No Known Problems Sister     No Known Problems Daughter     No Known Problems Daughter     No Known Problems Son       Social History:     Social History     Socioeconomic History    Marital status:      Spouse name: None    Number of children: 4    Years of education: less than high school    Highest education level: None   Occupational History    Occupation: retired   Tobacco Use    Smoking status: Never    Smokeless tobacco: Never   Vaping Use    Vaping Use: Never used   Substance and Sexual Activity    Alcohol use: No    Drug use: No    Sexual activity: Not Currently     Partners: Female     Birth control/protection: None   Other Topics Concern    None   Social History Narrative    Daily coffee consumption: 2 cp/day    Exercising regularly    living alone     Social Determinants of Health     Financial Resource Strain: Low Risk  (11/3/2022)    Overall Financial Resource Strain (CARDIA)     Difficulty of Paying Living Expenses: Not hard at all   Food Insecurity: Not on file   Transportation Needs: No Transportation Needs (11/3/2022)    PRAPARE - Transportation     Lack of Transportation (Medical): No     Lack of Transportation (Non-Medical):  No   Physical Activity: Not on file   Stress: Not on file   Social Connections: Not on file   Intimate Partner Violence: Not on file   Housing Stability: Not on file      Medications and Allergies:     Current Outpatient Medications   Medication Sig Dispense Refill    acetaminophen (TYLENOL) 500 mg tablet Take 500 mg by mouth every 6 (six) hours as needed for mild pain      amLODIPine (NORVASC) 5 mg tablet Take 1 tablet (5 mg total) by mouth every evening 90 tablet 3    atorvastatin (LIPITOR) 40 mg tablet Take 1 tablet (40 mg total) by mouth daily 90 tablet 3    cholecalciferol (VITAMIN D3) 1,000 units tablet Take 2,000 Units by mouth daily clopidogrel (PLAVIX) 75 mg tablet Take 1 tablet (75 mg total) by mouth daily 90 tablet 3    fluticasone (FLONASE) 50 mcg/act nasal spray 1 spray into each nostril 2 (two) times a day 18.2 mL 0    metFORMIN (GLUCOPHAGE-XR) 500 mg 24 hr tablet Take 2 tablets (1,000 mg total) by mouth daily with dinner 180 tablet 3    metoprolol succinate (TOPROL-XL) 50 mg 24 hr tablet Take 1 tablet (50 mg total) by mouth daily 90 tablet 3    Multiple Vitamin (MULTIVITAMIN) tablet Take 1 tablet by mouth daily      oxybutynin (DITROPAN-XL) 5 mg 24 hr tablet Take 1 tablet (5 mg total) by mouth daily 90 tablet 3    pantoprazole (PROTONIX) 40 mg tablet Take 1 tablet (40 mg total) by mouth daily 90 tablet 3     No current facility-administered medications for this visit. Allergies   Allergen Reactions    Oxycodone Nausea Only     Vomits from medication every time  She likes tylenol with codeine, that does not make her sick    Penicillins Rash      Immunizations:     Immunization History   Administered Date(s) Administered    COVID-19 PFIZER VACCINE 0.3 ML IM 01/21/2021, 02/10/2021, 11/19/2021    INFLUENZA 11/02/2021, 11/03/2022    Influenza Split High Dose Preservative Free IM 10/16/2013, 10/22/2014, 11/09/2016    Influenza, high dose seasonal 0.7 mL 10/10/2019, 09/16/2020, 11/03/2022, 10/19/2023    Influenza, seasonal, injectable 09/09/2011, 10/16/2012    Pneumococcal Conjugate 13-Valent 11/09/2016    Pneumococcal Polysaccharide PPV23 04/22/2014    Td (adult), adsorbed 12/08/1998    Tdap 07/18/2016    Zoster 08/25/2017      Health Maintenance:         Topic Date Due    Breast Cancer Screening: Mammogram  03/03/2024    Hepatitis C Screening  Addressed    Colorectal Cancer Screening  Discontinued         Topic Date Due    COVID-19 Vaccine (4 - Pfizer series) 01/14/2022      Medicare Screening Tests and Risk Assessments:     Emily Brown is here for her Subsequent Wellness visit.  Last Medicare Wellness visit information reviewed, patient interviewed and updates made to the record today. Health Risk Assessment:   Patient rates overall health as very good. Patient feels that their physical health rating is same. Patient is very satisfied with their life. Eyesight was rated as same. Hearing was rated as same. Patient feels that their emotional and mental health rating is much better. Patients states they are never, rarely angry. Patient states they are never, rarely unusually tired/fatigued. Pain experienced in the last 7 days has been some. Patient's pain rating has been 6/10. Patient states that she has experienced no weight loss or gain in last 6 months. Depression Screening:   PHQ-2 Score: 0      Fall Risk Screening: In the past year, patient has experienced: no history of falling in past year      Urinary Incontinence Screening:   Patient has leaked urine accidently in the last six months. Home Safety:  Patient does not have trouble with stairs inside or outside of their home. Patient has working smoke alarms and has working carbon monoxide detector. Home safety hazards include: none. Nutrition:   Current diet is Diabetic. Medications:   Patient is currently taking over-the-counter supplements. OTC medications include: see medication list. Patient is able to manage medications. Activities of Daily Living (ADLs)/Instrumental Activities of Daily Living (IADLs):   Walk and transfer into and out of bed and chair?: Yes  Dress and groom yourself?: Yes    Bathe or shower yourself?: Yes    Feed yourself?  Yes  Do your laundry/housekeeping?: Yes  Manage your money, pay your bills and track your expenses?: Yes  Make your own meals?: Yes    Do your own shopping?: Yes    Previous Hospitalizations:   Any hospitalizations or ED visits within the last 12 months?: No      Advance Care Planning:   Living will: Yes    Advanced directive: Yes      Cognitive Screening:   Provider or family/friend/caregiver concerned regarding cognition?: No    PREVENTIVE SCREENINGS      Cardiovascular Screening:    General: History Lipid Disorder and Screening Current      Diabetes Screening:     General: Screening Not Indicated and History Diabetes      Colorectal Cancer Screening:     General: Screening Current      Breast Cancer Screening:     General: Screening Current      Cervical Cancer Screening:    General: Screening Not Indicated      Osteoporosis Screening:    General: Screening Current      Abdominal Aortic Aneurysm (AAA) Screening:        General: Screening Not Indicated      Lung Cancer Screening:     General: Screening Not Indicated      Hepatitis C Screening:    General: Screening Current    Screening, Brief Intervention, and Referral to Treatment (SBIRT)    Screening  Typical number of drinks in a day: 0  Typical number of drinks in a week: 0  Interpretation: Low risk drinking behavior. Single Item Drug Screening:  How often have you used an illegal drug (including marijuana) or a prescription medication for non-medical reasons in the past year? never    Single Item Drug Screen Score: 0  Interpretation: Negative screen for possible drug use disorder    Brief Intervention  Alcohol & drug use screenings were reviewed. No concerns regarding substance use disorder identified. Other Counseling Topics:   Regular weightbearing exercise and calcium and vitamin D intake.           Physical Exam:     /74 (BP Location: Left arm, Patient Position: Sitting, Cuff Size: Standard)   Pulse 64   Temp 97.6 °F (36.4 °C)   Resp 16   Ht 4' 9" (1.448 m)   Wt 59.9 kg (132 lb)   SpO2 98%   BMI 28.56 kg/m²     Physical Exam     Marcela Jeffries MD

## 2024-01-01 DIAGNOSIS — I63.81 LACUNAR INFARCTION (HCC): ICD-10-CM

## 2024-01-02 RX ORDER — CLOPIDOGREL BISULFATE 75 MG/1
75 TABLET ORAL DAILY
Qty: 90 TABLET | Refills: 1 | Status: SHIPPED | OUTPATIENT
Start: 2024-01-02

## 2024-01-09 DIAGNOSIS — N32.81 OAB (OVERACTIVE BLADDER): ICD-10-CM

## 2024-01-09 RX ORDER — OXYBUTYNIN CHLORIDE 5 MG/1
5 TABLET, EXTENDED RELEASE ORAL DAILY
Qty: 90 TABLET | Refills: 1 | Status: SHIPPED | OUTPATIENT
Start: 2024-01-09

## 2024-01-15 ENCOUNTER — TELEPHONE (OUTPATIENT)
Dept: FAMILY MEDICINE CLINIC | Facility: CLINIC | Age: 79
End: 2024-01-15

## 2024-01-15 NOTE — TELEPHONE ENCOUNTER
Pt called in regards to medication oxybutynin (DITROPAN-XL) 5 mg 24 hr tablet . Pt wanted to refill medication. Informed pt script was sent to pharmacy on 01/09/2024.

## 2024-02-04 DIAGNOSIS — E78.2 MIXED HYPERLIPIDEMIA: ICD-10-CM

## 2024-02-04 RX ORDER — ATORVASTATIN CALCIUM 40 MG/1
40 TABLET, FILM COATED ORAL DAILY
Qty: 90 TABLET | Refills: 3 | Status: SHIPPED | OUTPATIENT
Start: 2024-02-04

## 2024-02-09 ENCOUNTER — TELEPHONE (OUTPATIENT)
Age: 79
End: 2024-02-09

## 2024-02-09 DIAGNOSIS — Z12.31 BREAST CANCER SCREENING BY MAMMOGRAM: Primary | ICD-10-CM

## 2024-02-09 NOTE — TELEPHONE ENCOUNTER
Patient is requesting her routine mammo order.    Please call patient when the order is placed in her chart.  Thank you

## 2024-02-12 ENCOUNTER — OFFICE VISIT (OUTPATIENT)
Dept: FAMILY MEDICINE CLINIC | Facility: CLINIC | Age: 79
End: 2024-02-12
Payer: COMMERCIAL

## 2024-02-12 VITALS
HEART RATE: 81 BPM | HEIGHT: 57 IN | SYSTOLIC BLOOD PRESSURE: 124 MMHG | TEMPERATURE: 98.2 F | WEIGHT: 129 LBS | BODY MASS INDEX: 27.83 KG/M2 | RESPIRATION RATE: 18 BRPM | OXYGEN SATURATION: 98 % | DIASTOLIC BLOOD PRESSURE: 78 MMHG

## 2024-02-12 DIAGNOSIS — L73.9 FOLLICULITIS: Primary | ICD-10-CM

## 2024-02-12 PROCEDURE — 99213 OFFICE O/P EST LOW 20 MIN: CPT | Performed by: STUDENT IN AN ORGANIZED HEALTH CARE EDUCATION/TRAINING PROGRAM

## 2024-02-12 NOTE — TELEPHONE ENCOUNTER
Lm on patient identified voicemail informing her that the order was placed for her mammogram and to contact the office with any questions or concerns.

## 2024-02-12 NOTE — PROGRESS NOTES
Name: Nicole Bledsoe      : 1945      MRN: [id number]  Encounter Provider: Silvia Johnson MD  Encounter Date: 2024   Encounter department: [facility]    Assessment & Plan     1. Folliculitis  Assessment & Plan:  Of nipple  Patient has mammogram order pending - have advised scheduling as soon as possible   Will treat conservatively with antibacterial ointment and warm compress  Patient advised to follow up in 3-5 days if symptoms persist or worsen  Will consider abx therapy and excision at future visit    Orders:  -     mupirocin (BACTROBAN) 2 % ointment; Apply topically 3 (three) times a day           Subjective     HPI  Patient presents for pruritic lesion on right nipple.  Denies any trauma to the area. Denies fever, no rash elsewhere. Lesion is not painful but she reports her nightgown can irritate the area. Has been applying a neosporin to the area with no relief. Lesion first noted approximately 5 days ago.     Review of Systems   Constitutional:  Negative for chills and fever.   HENT:  Negative for congestion, ear pain, rhinorrhea and sinus pain.    Eyes:  Negative for visual disturbance.   Respiratory:  Negative for chest tightness, shortness of breath and wheezing.    Cardiovascular:  Negative for chest pain and palpitations.   Gastrointestinal:  Negative for abdominal pain, constipation, diarrhea and vomiting.   Endocrine: Negative for polyuria.   Genitourinary:  Negative for dysuria.   Musculoskeletal:  Negative for arthralgias and myalgias.   Neurological:  Negative for dizziness, syncope and light-headedness.       Past Medical History:   Diagnosis Date    Endometriosis     GERD (gastroesophageal reflux disease)     Hyperlipidemia     Hypertension     malignant / last assessed 17    IFG (impaired fasting glucose)     last assessed 17    Stroke (HCC) 2018    no residual    Sudden-onset sensorineural hearing loss 2017     Past Surgical History:   Procedure  Laterality Date    BUNIONECTOMY Right 2010    COLONOSCOPY      complete    HYSTERECTOMY      total abdominal with b/l ovary removal    KNEE ARTHROSCOPY Right     menisectomy    KNEE CARTILAGE SURGERY Right     theraputic    HI OPEN TX DISTAL FIBULAR FRACTURE LAT MALLEOLUS Left 10/18/2018    Procedure: OPEN REDUCTION INTERNAL FIXATION LEFT ANKLE LATERAL MALLEOLUS;  Surgeon: Pantera Sy MD;  Location[facility];  Service: Orthopedics     Family History   Problem Relation Age of Onset    Early death Mother     Heart attack Mother         acute MI    Heart attack Sister     Heart disease Sister         CABG    Coronary artery disease Sister     No Known Problems Father     Diabetes Brother     Cancer Brother 54    Cancer Family         malignant neoplasm of urinary bladder    No Known Problems Daughter     No Known Problems Maternal Grandmother     No Known Problems Maternal Grandfather     No Known Problems Paternal Grandmother     No Known Problems Paternal Grandfather     Breast cancer Sister     No Known Problems Sister     No Known Problems Daughter     No Known Problems Daughter     No Known Problems Son      Social History     Socioeconomic History    Marital status:      Spouse name: None    Number of children: 4    Years of education: less than high school    Highest education level: None   Occupational History    Occupation: retired   Tobacco Use    Smoking status: Never    Smokeless tobacco: Never   Vaping Use    Vaping status: Never Used   Substance and Sexual Activity    Alcohol use: No    Drug use: No    Sexual activity: Not Currently     Partners: Female     Birth control/protection: None   Other Topics Concern    None   Social History Narrative    Daily coffee consumption: 2 cp/day    Exercising regularly    living alone     Social Determinants of Health     Financial Resource Strain: Low Risk  (11/27/2023)    Overall Financial Resource Strain (CARDIA)     Difficulty of Paying Living  Expenses: Not hard at all   Food Insecurity: Not on file   Transportation Needs: No Transportation Needs (11/27/2023)    PRAPARE - Transportation     Lack of Transportation (Medical): No     Lack of Transportation (Non-Medical): No   Physical Activity: Not on file   Stress: Not on file   Social Connections: Not on file   Intimate Partner Violence: Not on file   Housing Stability: Not on file     Current Outpatient Medications on File Prior to Visit   Medication Sig    acetaminophen (TYLENOL) 500 mg tablet Take 500 mg by mouth every 6 (six) hours as needed for mild pain    amLODIPine (NORVASC) 5 mg tablet Take 1 tablet (5 mg total) by mouth every evening    atorvastatin (LIPITOR) 40 mg tablet take 1 tablet by mouth once daily (Patient taking differently: Take 40 mg by mouth daily Takes 1/2 tab once daily)    cholecalciferol (VITAMIN D3) 1,000 units tablet Take 2,000 Units by mouth daily    clopidogrel (PLAVIX) 75 mg tablet take 1 tablet by mouth once daily    fluticasone (FLONASE) 50 mcg/act nasal spray 1 spray into each nostril 2 (two) times a day    metFORMIN (GLUCOPHAGE-XR) 500 mg 24 hr tablet Take 2 tablets (1,000 mg total) by mouth daily with dinner    metoprolol succinate (TOPROL-XL) 50 mg 24 hr tablet Take 1 tablet (50 mg total) by mouth daily    Multiple Vitamin (MULTIVITAMIN) tablet Take 1 tablet by mouth daily    oxybutynin (DITROPAN-XL) 5 mg 24 hr tablet take 1 tablet by mouth once daily    pantoprazole (PROTONIX) 40 mg tablet Take 1 tablet (40 mg total) by mouth daily (Patient taking differently: Take 40 mg by mouth every other day)     Allergies   Allergen Reactions    Oxycodone Nausea Only     Vomits from medication every time  She likes tylenol with codeine, that does not make her sick    Penicillins Rash     Immunization History   Administered Date(s) Administered    COVID-19 PFIZER VACCINE 0.3 ML IM 01/21/2021, 02/10/2021, 11/19/2021    INFLUENZA 11/02/2021, 11/03/2022    Influenza Split High Dose  "Preservative Free IM 10/16/2013, 10/22/2014, 11/09/2016    Influenza, high dose seasonal 0.7 mL 10/10/2019, 09/16/2020, 11/03/2022, 10/19/2023    Influenza, seasonal, injectable 09/09/2011, 10/16/2012    Pneumococcal Conjugate 13-Valent 11/09/2016    Pneumococcal Polysaccharide PPV23 04/22/2014    Td (adult), adsorbed 12/08/1998    Tdap 07/18/2016    Zoster 08/25/2017       Objective     /78 (BP Location: Left arm, Patient Position: Sitting, Cuff Size: Standard)   Pulse 81   Temp 98.2 °F (36.8 °C)   Resp 18   Ht 4' 9\" (1.448 m)   Wt 58.5 kg (129 lb)   SpO2 98%   BMI 27.92 kg/m²     Physical Exam  Constitutional:       Appearance: Normal appearance.   HENT:      Head: Normocephalic and atraumatic.      Nose: Nose normal.   Eyes:      Conjunctiva/sclera: Conjunctivae normal.   Cardiovascular:      Rate and Rhythm: Normal rate.   Pulmonary:      Effort: Pulmonary effort is normal.   Musculoskeletal:         General: Normal range of motion.      Cervical back: Normal range of motion.   Skin:     General: Skin is warm and dry.      Findings: Lesion (puritic lesion on right nipple with surrounding erythema - pls see image below) present.   Neurological:      Mental Status: She is alert and oriented to person, place, and time.   Psychiatric:         Behavior: Behavior normal.       Silvia Johnson MD    "

## 2024-02-12 NOTE — ASSESSMENT & PLAN NOTE
Of nipple  Patient has mammogram order pending - have advised scheduling as soon as possible   Will treat conservatively with antibacterial ointment and warm compress  Patient advised to follow up in 3-5 days if symptoms persist or worsen  Will consider abx therapy and excision at future visit

## 2024-04-09 ENCOUNTER — HOSPITAL ENCOUNTER (OUTPATIENT)
Dept: RADIOLOGY | Facility: MEDICAL CENTER | Age: 79
Discharge: HOME | End: 2024-04-09
Payer: COMMERCIAL

## 2024-04-09 VITALS — BODY MASS INDEX: 27.83 KG/M2 | HEIGHT: 57 IN | WEIGHT: 129 LBS

## 2024-04-09 DIAGNOSIS — Z12.31 BREAST CANCER SCREENING BY MAMMOGRAM: ICD-10-CM

## 2024-04-09 DIAGNOSIS — Z12.31 ENCOUNTER FOR SCREENING MAMMOGRAM FOR MALIGNANT NEOPLASM OF BREAST: ICD-10-CM

## 2024-04-09 PROCEDURE — 77063 BREAST TOMOSYNTHESIS BI: CPT

## 2024-04-09 PROCEDURE — 77067 SCR MAMMO BI INCL CAD: CPT

## 2024-04-16 DIAGNOSIS — R80.9 TYPE 2 DIABETES MELLITUS WITH MICROALBUMINURIA, WITHOUT LONG-TERM CURRENT USE OF INSULIN (HCC): ICD-10-CM

## 2024-04-16 DIAGNOSIS — E11.29 TYPE 2 DIABETES MELLITUS WITH MICROALBUMINURIA, WITHOUT LONG-TERM CURRENT USE OF INSULIN (HCC): ICD-10-CM

## 2024-04-16 RX ORDER — METFORMIN HYDROCHLORIDE 500 MG/1
TABLET, EXTENDED RELEASE ORAL
Qty: 180 TABLET | Refills: 1 | Status: SHIPPED | OUTPATIENT
Start: 2024-04-16

## 2024-06-04 DIAGNOSIS — I10 ESSENTIAL HYPERTENSION: ICD-10-CM

## 2024-06-04 RX ORDER — METOPROLOL SUCCINATE 50 MG/1
50 TABLET, EXTENDED RELEASE ORAL DAILY
Qty: 90 TABLET | Refills: 1 | Status: SHIPPED | OUTPATIENT
Start: 2024-06-04

## 2024-06-11 DIAGNOSIS — I10 ESSENTIAL HYPERTENSION: ICD-10-CM

## 2024-06-11 RX ORDER — METOPROLOL SUCCINATE 50 MG/1
50 TABLET, EXTENDED RELEASE ORAL DAILY
Qty: 90 TABLET | Refills: 1 | Status: SHIPPED | OUTPATIENT
Start: 2024-06-11

## 2024-06-11 NOTE — TELEPHONE ENCOUNTER
Reason for call:   [x] Refill   [] Prior Auth  [] Other:     Office:   [x] PCP/Provider - Donaldo Johnson  [] Specialty/Provider -     Medication: Metoprolol XR    Dose/Frequency: 50 mg Daily     Quantity: 90    Pharmacy: [facility] [place]    Does the patient have enough for 3 days?   [x] Yes   [] No - Send as HP to POD

## 2024-06-17 ENCOUNTER — RA CDI HCC (OUTPATIENT)
Dept: OTHER | Facility: HOSPITAL | Age: 79
End: 2024-06-17

## 2024-06-24 ENCOUNTER — APPOINTMENT (OUTPATIENT)
Dept: LAB | Facility: CLINIC | Age: 79
End: 2024-06-24
Payer: COMMERCIAL

## 2024-06-24 LAB
ALBUMIN SERPL BCG-MCNC: 4.4 G/DL (ref 3.5–5)
ALP SERPL-CCNC: 66 U/L (ref 34–104)
ALT SERPL W P-5'-P-CCNC: 18 U/L (ref 7–52)
ANION GAP SERPL CALCULATED.3IONS-SCNC: 14 MMOL/L (ref 4–13)
AST SERPL W P-5'-P-CCNC: 24 U/L (ref 13–39)
BASOPHILS # BLD AUTO: 0.05 THOUSANDS/ÂΜL (ref 0–0.1)
BASOPHILS NFR BLD AUTO: 1 % (ref 0–1)
BILIRUB SERPL-MCNC: 0.58 MG/DL (ref 0.2–1)
BUN SERPL-MCNC: 16 MG/DL (ref 5–25)
CALCIUM SERPL-MCNC: 9.7 MG/DL (ref 8.4–10.2)
CHLORIDE SERPL-SCNC: 103 MMOL/L (ref 96–108)
CHOLEST SERPL-MCNC: 159 MG/DL
CO2 SERPL-SCNC: 22 MMOL/L (ref 21–32)
CREAT SERPL-MCNC: 0.58 MG/DL (ref 0.6–1.3)
CREAT UR-MCNC: 23.3 MG/DL
EOSINOPHIL # BLD AUTO: 0.2 THOUSAND/ÂΜL (ref 0–0.61)
EOSINOPHIL NFR BLD AUTO: 3 % (ref 0–6)
ERYTHROCYTE [DISTWIDTH] IN BLOOD BY AUTOMATED COUNT: 13.2 % (ref 11.6–15.1)
EST. AVERAGE GLUCOSE BLD GHB EST-MCNC: 134 MG/DL
GFR SERPL CREATININE-BSD FRML MDRD: 88 ML/MIN/1.73SQ M
GLUCOSE P FAST SERPL-MCNC: 114 MG/DL (ref 65–99)
HBA1C MFR BLD: 6.3 %
HCT VFR BLD AUTO: 42.8 % (ref 34.8–46.1)
HDLC SERPL-MCNC: 44 MG/DL
HGB BLD-MCNC: 13.6 G/DL (ref 11.5–15.4)
IMM GRANULOCYTES # BLD AUTO: 0 THOUSAND/UL (ref 0–0.2)
IMM GRANULOCYTES NFR BLD AUTO: 0 % (ref 0–2)
LDLC SERPL CALC-MCNC: 86 MG/DL (ref 0–100)
LYMPHOCYTES # BLD AUTO: 3.33 THOUSANDS/ÂΜL (ref 0.6–4.47)
LYMPHOCYTES NFR BLD AUTO: 45 % (ref 14–44)
MCH RBC QN AUTO: 29.6 PG (ref 26.8–34.3)
MCHC RBC AUTO-ENTMCNC: 31.8 G/DL (ref 31.4–37.4)
MCV RBC AUTO: 93 FL (ref 82–98)
MICROALBUMIN UR-MCNC: 9 MG/L
MICROALBUMIN/CREAT 24H UR: 39 MG/G CREATININE (ref 0–30)
MONOCYTES # BLD AUTO: 0.53 THOUSAND/ÂΜL (ref 0.17–1.22)
MONOCYTES NFR BLD AUTO: 7 % (ref 4–12)
NEUTROPHILS # BLD AUTO: 3.22 THOUSANDS/ÂΜL (ref 1.85–7.62)
NEUTS SEG NFR BLD AUTO: 44 % (ref 43–75)
NONHDLC SERPL-MCNC: 115 MG/DL
NRBC BLD AUTO-RTO: 0 /100 WBCS
PLATELET # BLD AUTO: 200 THOUSANDS/UL (ref 149–390)
PMV BLD AUTO: 12.7 FL (ref 8.9–12.7)
POTASSIUM SERPL-SCNC: 3.9 MMOL/L (ref 3.5–5.3)
PROT SERPL-MCNC: 7.5 G/DL (ref 6.4–8.4)
RBC # BLD AUTO: 4.59 MILLION/UL (ref 3.81–5.12)
SODIUM SERPL-SCNC: 139 MMOL/L (ref 135–147)
TRIGL SERPL-MCNC: 143 MG/DL
WBC # BLD AUTO: 7.33 THOUSAND/UL (ref 4.31–10.16)

## 2024-06-26 NOTE — PROGRESS NOTES
Ambulatory Visit  Name: Nicole Bledsoe      : 1945      MRN: [id number]  Encounter Provider: Donaldo Johnson MD  Encounter Date: 2024   Encounter department: [facility]    Assessment & Plan   1. Type 2 diabetes mellitus with microalbuminuria, without long-term current use of insulin (HCC)  -     Hemoglobin A1C  2. Essential hypertension  -     CBC and differential  -     Comprehensive metabolic panel  3. Mixed hyperlipidemia  -     Lipid panel  4. Meningioma (HCC)  5. Lacunar infarction (HCC)    Continue with current medications.  Office visit 6 months with repeat labs       History of Present Illness       Follow-up visit for chronic medical problems. .  Medications reviewed.  Mammogram 2024     Type 2 diabetes mellitus on Metformin  mg 2/day  2024  , A1c 6.3. Urine albumin 9.0 decreased from 20.9 not on ACE or ARB.  No neuropathy symptoms. Current with eye exam       Hypertension blood pressures have been stable on Amlodipine 5 mg daily and Metoprolol ER 50 mg daily. 2024 creatinine 0.58. GFR 88. Electrolytes normal. Hgb 13.6           Hyperlipidemia mixed type-s/p lacunar infarct on Atorvastatin 40 mg 1/2 tablet daily and 1 fish oil capsule/day.  2024 Lipid profile cholesterol 150. Triglycerides 143.  HDL 44. LDL 86.  LFTs normal. 2022  TSH 2.850       Recent Results (from the past 672 hour(s))   Comprehensive metabolic panel    Collection Time: 24 11:46 AM   Result Value Ref Range    Sodium 139 135 - 147 mmol/L    Potassium 3.9 3.5 - 5.3 mmol/L    Chloride 103 96 - 108 mmol/L    CO2 22 21 - 32 mmol/L    ANION GAP 14 (H) 4 - 13 mmol/L    BUN 16 5 - 25 mg/dL    Creatinine 0.58 (L) 0.60 - 1.30 mg/dL    Glucose, Fasting 114 (H) 65 - 99 mg/dL    Calcium 9.7 8.4 - 10.2 mg/dL    AST 24 13 - 39 U/L    ALT 18 7 - 52 U/L    Alkaline Phosphatase 66 34 - 104 U/L    Total Protein 7.5 6.4 - 8.4 g/dL    Albumin 4.4 3.5 - 5.0 g/dL    Total Bilirubin 0.58 0.20 -  1.00 mg/dL    eGFR 88 ml/min/1.73sq m   CBC and differential    Collection Time: 06/24/24 11:46 AM   Result Value Ref Range    WBC 7.33 4.31 - 10.16 Thousand/uL    RBC 4.59 3.81 - 5.12 Million/uL    Hemoglobin 13.6 11.5 - 15.4 g/dL    Hematocrit 42.8 34.8 - 46.1 %    MCV 93 82 - 98 fL    MCH 29.6 26.8 - 34.3 pg    MCHC 31.8 31.4 - 37.4 g/dL    RDW 13.2 11.6 - 15.1 %    MPV 12.7 8.9 - 12.7 fL    Platelets 200 149 - 390 Thousands/uL    nRBC 0 /100 WBCs    Segmented % 44 43 - 75 %    Immature Grans % 0 0 - 2 %    Lymphocytes % 45 (H) 14 - 44 %    Monocytes % 7 4 - 12 %    Eosinophils Relative 3 0 - 6 %    Basophils Relative 1 0 - 1 %    Absolute Neutrophils 3.22 1.85 - 7.62 Thousands/µL    Absolute Immature Grans 0.00 0.00 - 0.20 Thousand/uL    Absolute Lymphocytes 3.33 0.60 - 4.47 Thousands/µL    Absolute Monocytes 0.53 0.17 - 1.22 Thousand/µL    Eosinophils Absolute 0.20 0.00 - 0.61 Thousand/µL    Basophils Absolute 0.05 0.00 - 0.10 Thousands/µL   Lipid panel    Collection Time: 06/24/24 11:46 AM   Result Value Ref Range    Cholesterol 159 See Comment mg/dL    Triglycerides 143 See Comment mg/dL    HDL, Direct 44 (L) >=50 mg/dL    LDL Calculated 86 0 - 100 mg/dL    Non-HDL-Chol (CHOL-HDL) 115 mg/dl   Hemoglobin A1C    Collection Time: 06/24/24 11:46 AM   Result Value Ref Range    Hemoglobin A1C 6.3 (H) Normal 4.0-5.6%; PreDiabetic 5.7-6.4%; Diabetic >=6.5%; Glycemic control for adults with diabetes <7.0% %     mg/dl   Albumin / creatinine urine ratio    Collection Time: 06/24/24 11:46 AM   Result Value Ref Range    Creatinine, Ur 23.3 Reference range not established. mg/dL    Albumin,U,Random 9.0 <20.0 mg/L    Albumin Creat Ratio 39 (H) 0 - 30 mg/g creatinine     Lab Results   Component Value Date    [id number] 2.850 11/01/2022         Review of Systems   Constitutional:  Negative for appetite change, chills, fatigue, fever and unexpected weight change.   HENT:  Positive for hearing loss (hearing aid left  ear ). Negative for congestion, ear pain, rhinorrhea, sore throat and trouble swallowing.          09/2017 ENT evaluation for sudden hearing loss right ear.  MRI of brain at that time showed no evidence of acoustic neuroma.  Small retention cyst left maxillary sinus.  1.5 cm frontal meningioma.  She was seen and evaluated by neuro surgery.   Eyes:  Negative for visual disturbance.   Respiratory:  Negative for cough, shortness of breath and wheezing.    Cardiovascular:  Negative for chest pain, palpitations and leg swelling.        ER admission 06/2017 for accelerated hypertension.  07/2017 renal artery Dopplers no renal artery stenosis. 07/2017 stress echocardiogram no stress-induced ischemia.  No regional wall motion abnormalities.  Ejection fraction 60%.   Gastrointestinal:  Negative for abdominal pain, blood in stool, constipation, diarrhea, nausea and vomiting.         GERD stable on Pantoprazole 40 mg every other day.  No reflux.  No dysphagia. Colonoscopy 12/2015   Endocrine:          07/2017 DEXA scan T-score -1.7 left hip consistent with low bone mineral density. On vitamin D supplement.    Genitourinary:  Negative for difficulty urinating.          Overactive bladder with improvement on Ditropan at Hs.  Nocturia 1-2.  Occasional stress incontinence.   Musculoskeletal:  Negative for arthralgias and myalgias.        S/p left ankle fracture ORIF 10/2018. OA left knee X-rays 01/2018 showed moderate narrowing of the medial joint compartment and small joint effusion. S/p steroid injection left knee 01/2023 with relief.    Skin:  Negative for rash.   Neurological:  Negative for dizziness and headaches.        See [facility]. CNS meningioma MRI brain 06/2022 Stable left frontal meningioma.  No significant mass effect or edema. Stable chronic microangiopathy. Stable chronic lacunar infarct in the right basal ganglia. On  Plavix   Hematological:  Negative for adenopathy. Does not bruise/bleed easily.    Psychiatric/Behavioral:  Negative for dysphoric mood and sleep disturbance.      Past Medical History:   Diagnosis Date    Endometriosis     GERD (gastroesophageal reflux disease)     Hyperlipidemia     Hypertension     malignant / last assessed 6/16/17    IFG (impaired fasting glucose)     last assessed 6/16/17    Stroke (HCC) 06/2018    no residual    Sudden-onset sensorineural hearing loss 12/6/2017     Past Surgical History:   Procedure Laterality Date    BUNIONECTOMY Right 2010    COLONOSCOPY      complete    HYSTERECTOMY      total abdominal with b/l ovary removal    KNEE ARTHROSCOPY Right     menisectomy    KNEE CARTILAGE SURGERY Right     theraputic    MD OPEN TX DISTAL FIBULAR FRACTURE LAT MALLEOLUS Left 10/18/2018    Procedure: OPEN REDUCTION INTERNAL FIXATION LEFT ANKLE LATERAL MALLEOLUS;  Surgeon: Pantera Sy MD;  Location: [facility];  Service: Orthopedics     Family History   Problem Relation Age of Onset    Early death Mother     Heart attack Mother         acute MI    Heart attack Sister     Heart disease Sister         CABG    Coronary artery disease Sister     No Known Problems Father     Diabetes Brother     Cancer Brother 54    Cancer Family         malignant neoplasm of urinary bladder    No Known Problems Daughter     No Known Problems Maternal Grandmother     No Known Problems Maternal Grandfather     No Known Problems Paternal Grandmother     No Known Problems Paternal Grandfather     Breast cancer Sister     No Known Problems Sister     No Known Problems Daughter     No Known Problems Daughter     No Known Problems Son      Social History     Tobacco Use    Smoking status: Never    Smokeless tobacco: Never   Vaping Use    Vaping status: Never Used   Substance and Sexual Activity    Alcohol use: No    Drug use: No    Sexual activity: Not Currently     Partners: Female     Birth control/protection: None     Current Outpatient Medications on File Prior to Visit   Medication Sig     acetaminophen (TYLENOL) 500 mg tablet Take 500 mg by mouth every 6 (six) hours as needed for mild pain    amLODIPine (NORVASC) 5 mg tablet Take 1 tablet (5 mg total) by mouth every evening    atorvastatin (LIPITOR) 40 mg tablet take 1 tablet by mouth once daily (Patient taking differently: Take 40 mg by mouth daily Takes 1/2 tab once daily)    cholecalciferol (VITAMIN D3) 1,000 units tablet Take 2,000 Units by mouth daily    clopidogrel (PLAVIX) 75 mg tablet take 1 tablet by mouth once daily    fluticasone (FLONASE) 50 mcg/act nasal spray 1 spray into each nostril 2 (two) times a day    metFORMIN (GLUCOPHAGE-XR) 500 mg 24 hr tablet take 2 tablets by mouth daily with dinner    metoprolol succinate (TOPROL-XL) 50 mg 24 hr tablet Take 1 tablet (50 mg total) by mouth daily    Multiple Vitamin (MULTIVITAMIN) tablet Take 1 tablet by mouth daily    mupirocin (BACTROBAN) 2 % ointment Apply topically 3 (three) times a day    oxybutynin (DITROPAN-XL) 5 mg 24 hr tablet take 1 tablet by mouth once daily    pantoprazole (PROTONIX) 40 mg tablet Take 1 tablet (40 mg total) by mouth daily (Patient taking differently: Take 40 mg by mouth every other day)     Allergies   Allergen Reactions    Oxycodone Nausea Only     Vomits from medication every time  She likes tylenol with codeine, that does not make her sick    Penicillins Rash     Immunization History   Administered Date(s) Administered    COVID-19 PFIZER VACCINE 0.3 ML IM 01/21/2021, 02/10/2021, 11/19/2021    INFLUENZA 11/02/2021, 11/03/2022    Influenza Split High Dose Preservative Free IM 10/16/2013, 10/22/2014, 11/09/2016    Influenza, high dose seasonal 0.7 mL 10/10/2019, 09/16/2020, 11/03/2022, 10/19/2023    Influenza, seasonal, injectable 09/09/2011, 10/16/2012    Pneumococcal Conjugate 13-Valent 11/09/2016    Pneumococcal Polysaccharide PPV23 04/22/2014    Td (adult), adsorbed 12/08/1998    Tdap 07/18/2016    Zoster 08/25/2017     Objective       /72 (BP Location:  "Left arm, Patient Position: Sitting, Cuff Size: Standard)   Pulse 74   Temp 98 °F (36.7 °C)   Resp 16   Ht 4' 9\" (1.448 m)   Wt 56.7 kg (125 lb)   SpO2 95%   BMI 27.05 kg/m²      BP Readings from Last 3 Encounters:   06/27/24 118/72   02/12/24 124/78   11/27/23 126/74        Wt Readings from Last 3 Encounters:   06/27/24 56.7 kg (125 lb)   04/09/24 58.5 kg (129 lb)   02/12/24 58.5 kg (129 lb)         Physical Exam  Constitutional:       General: She is not in acute distress.  HENT:      Right Ear: Tympanic membrane and ear canal normal.      Left Ear: Tympanic membrane and ear canal normal.      Mouth/Throat:      Mouth: No oral lesions.      Pharynx: Oropharynx is clear.   Eyes:      General: No scleral icterus.     Extraocular Movements: Extraocular movements intact.      Conjunctiva/sclera: Conjunctivae normal.      Pupils: Pupils are equal, round, and reactive to light.   Neck:      Thyroid: No thyroid mass or thyromegaly.      Vascular: Normal carotid pulses. No carotid bruit.   Cardiovascular:      Rate and Rhythm: Normal rate and regular rhythm.      Pulses: no weak pulses.           Dorsalis pedis pulses are 2+ on the right side and 2+ on the left side.        Posterior tibial pulses are 2+ on the right side and 2+ on the left side.      Heart sounds: No murmur heard.     No gallop.   Pulmonary:      Effort: Pulmonary effort is normal.      Breath sounds: Normal breath sounds. No wheezing or rales.   Musculoskeletal:      Right lower leg: No edema.      Left lower leg: No edema.   Feet:      Right foot:      Skin integrity: No ulcer, skin breakdown, erythema, warmth, callus or dry skin.      Left foot:      Skin integrity: No ulcer, skin breakdown, erythema, warmth, callus or dry skin.   Lymphadenopathy:      Cervical: No cervical adenopathy.   Skin:     Findings: No rash.   Neurological:      General: No focal deficit present.      Mental Status: She is alert and oriented to person, place, and time. "   Psychiatric:         Mood and Affect: Mood normal.       Patient's shoes and socks removed.    Right Foot/Ankle   Right Foot Inspection  Skin Exam: skin normal and skin intact. No dry skin, no warmth, no callus, no erythema, no maceration, no abnormal color, no pre-ulcer, no ulcer and no callus.     Toe Exam: ROM and strength within normal limits. No swelling, no tenderness, erythema and  no right toe deformity    Sensory   Monofilament testing: intact    Vascular  Capillary refills: < 3 seconds  The right DP pulse is 2+. The right PT pulse is 2+.     Left Foot/Ankle  Left Foot Inspection  Skin Exam: skin normal and skin intact. No dry skin, no warmth, no erythema, no maceration, normal color, no pre-ulcer, no ulcer and no callus.     Toe Exam: ROM and strength within normal limits. No swelling, no tenderness, no erythema and no left toe deformity.     Sensory   Monofilament testing: intact    Vascular  Capillary refills: < 3 seconds  The left DP pulse is 2+. The left PT pulse is 2+.     Assign Risk Category  No deformity present  No loss of protective sensation  No weak pulses  Risk: 0     Administrative Statements

## 2024-06-27 ENCOUNTER — OFFICE VISIT (OUTPATIENT)
Dept: FAMILY MEDICINE CLINIC | Facility: CLINIC | Age: 79
End: 2024-06-27
Payer: COMMERCIAL

## 2024-06-27 VITALS
OXYGEN SATURATION: 95 % | TEMPERATURE: 98 F | BODY MASS INDEX: 26.97 KG/M2 | HEIGHT: 57 IN | WEIGHT: 125 LBS | RESPIRATION RATE: 16 BRPM | DIASTOLIC BLOOD PRESSURE: 72 MMHG | SYSTOLIC BLOOD PRESSURE: 118 MMHG | HEART RATE: 74 BPM

## 2024-06-27 DIAGNOSIS — E11.29 TYPE 2 DIABETES MELLITUS WITH MICROALBUMINURIA, WITHOUT LONG-TERM CURRENT USE OF INSULIN (HCC): Primary | ICD-10-CM

## 2024-06-27 DIAGNOSIS — I63.81 LACUNAR INFARCTION (HCC): ICD-10-CM

## 2024-06-27 DIAGNOSIS — R80.9 TYPE 2 DIABETES MELLITUS WITH MICROALBUMINURIA, WITHOUT LONG-TERM CURRENT USE OF INSULIN (HCC): Primary | ICD-10-CM

## 2024-06-27 DIAGNOSIS — I10 ESSENTIAL HYPERTENSION: ICD-10-CM

## 2024-06-27 DIAGNOSIS — D32.9 MENINGIOMA (HCC): ICD-10-CM

## 2024-06-27 DIAGNOSIS — E78.2 MIXED HYPERLIPIDEMIA: ICD-10-CM

## 2024-06-27 PROBLEM — L73.9 FOLLICULITIS: Status: RESOLVED | Noted: 2024-02-12 | Resolved: 2024-06-27

## 2024-06-27 PROCEDURE — G2211 COMPLEX E/M VISIT ADD ON: HCPCS | Performed by: FAMILY MEDICINE

## 2024-06-27 PROCEDURE — 99214 OFFICE O/P EST MOD 30 MIN: CPT | Performed by: FAMILY MEDICINE

## 2024-06-30 DIAGNOSIS — I63.81 LACUNAR INFARCTION (HCC): ICD-10-CM

## 2024-06-30 RX ORDER — CLOPIDOGREL BISULFATE 75 MG/1
75 TABLET ORAL DAILY
Qty: 90 TABLET | Refills: 1 | Status: SHIPPED | OUTPATIENT
Start: 2024-06-30

## 2024-07-04 DIAGNOSIS — N32.81 OAB (OVERACTIVE BLADDER): ICD-10-CM

## 2024-07-04 DIAGNOSIS — I63.81 LACUNAR INFARCTION (HCC): ICD-10-CM

## 2024-07-05 RX ORDER — PANTOPRAZOLE SODIUM 40 MG/1
40 TABLET, DELAYED RELEASE ORAL DAILY
Qty: 90 TABLET | Refills: 3 | Status: SHIPPED | OUTPATIENT
Start: 2024-07-05

## 2024-07-05 RX ORDER — OXYBUTYNIN CHLORIDE 5 MG/1
5 TABLET, EXTENDED RELEASE ORAL DAILY
Qty: 90 TABLET | Refills: 1 | Status: SHIPPED | OUTPATIENT
Start: 2024-07-05

## 2024-07-15 DIAGNOSIS — I10 ESSENTIAL HYPERTENSION: ICD-10-CM

## 2024-07-15 RX ORDER — AMLODIPINE BESYLATE 5 MG/1
5 TABLET ORAL EVERY EVENING
Qty: 90 TABLET | Refills: 1 | Status: SHIPPED | OUTPATIENT
Start: 2024-07-15

## 2024-08-12 ENCOUNTER — TELEPHONE (OUTPATIENT)
Age: 79
End: 2024-08-12

## 2024-08-12 NOTE — TELEPHONE ENCOUNTER
Called patient, unable to connect, disconnected x3 .. ? Problem with patients phone, will try again later.

## 2024-09-06 ENCOUNTER — TELEPHONE (OUTPATIENT)
Dept: FAMILY MEDICINE CLINIC | Facility: CLINIC | Age: 79
End: 2024-09-06

## 2024-09-06 NOTE — TELEPHONE ENCOUNTER
Attempted to contact the home phone several times and was unable to reschedule the appointment. Left message on cell number letting patient know to call back to reschedule 1/9/2025 appointment due to change in providers schedule.

## 2024-10-02 ENCOUNTER — TELEPHONE (OUTPATIENT)
Dept: FAMILY MEDICINE CLINIC | Facility: CLINIC | Age: 79
End: 2024-10-02

## 2024-10-10 DIAGNOSIS — E11.29 TYPE 2 DIABETES MELLITUS WITH MICROALBUMINURIA, WITHOUT LONG-TERM CURRENT USE OF INSULIN (HCC): ICD-10-CM

## 2024-10-10 DIAGNOSIS — R80.9 TYPE 2 DIABETES MELLITUS WITH MICROALBUMINURIA, WITHOUT LONG-TERM CURRENT USE OF INSULIN (HCC): ICD-10-CM

## 2024-10-10 RX ORDER — METFORMIN HCL 500 MG
TABLET, EXTENDED RELEASE 24 HR ORAL
Qty: 180 TABLET | Refills: 1 | Status: SHIPPED | OUTPATIENT
Start: 2024-10-10

## 2024-12-10 DIAGNOSIS — I10 ESSENTIAL HYPERTENSION: ICD-10-CM

## 2024-12-10 NOTE — TELEPHONE ENCOUNTER
Reason for call:   [x] Refill   [] Prior Auth  [] Other:     Office:   [x] PCP/Provider -   [] Specialty/Provider -     Medication: Metoprolol Succinate     Dose/Frequency: 50 mg 24 hr tablet taken by mouth once daily     Quantity: 90    Pharmacy: [id number] - [place] - [address] [facility] [phone]     Does the patient have enough for 3 days?   [x] Yes   [] No - Send as HP to POD

## 2024-12-11 RX ORDER — METOPROLOL SUCCINATE 50 MG/1
50 TABLET, EXTENDED RELEASE ORAL DAILY
Qty: 90 TABLET | Refills: 1 | Status: SHIPPED | OUTPATIENT
Start: 2024-12-11

## 2024-12-21 DIAGNOSIS — I63.81 LACUNAR INFARCTION (HCC): ICD-10-CM

## 2024-12-23 RX ORDER — CLOPIDOGREL BISULFATE 75 MG/1
75 TABLET ORAL DAILY
Qty: 90 TABLET | Refills: 1 | Status: SHIPPED | OUTPATIENT
Start: 2024-12-23

## 2024-12-28 DIAGNOSIS — N32.81 OAB (OVERACTIVE BLADDER): ICD-10-CM

## 2024-12-30 RX ORDER — OXYBUTYNIN CHLORIDE 5 MG/1
5 TABLET, EXTENDED RELEASE ORAL DAILY
Qty: 90 TABLET | Refills: 1 | Status: SHIPPED | OUTPATIENT
Start: 2024-12-30

## 2024-12-31 ENCOUNTER — APPOINTMENT (OUTPATIENT)
Dept: LAB | Facility: CLINIC | Age: 79
End: 2024-12-31
Payer: COMMERCIAL

## 2024-12-31 LAB
ALBUMIN SERPL BCG-MCNC: 4.4 G/DL (ref 3.5–5)
ALP SERPL-CCNC: 61 U/L (ref 34–104)
ALT SERPL W P-5'-P-CCNC: 16 U/L (ref 7–52)
ANION GAP SERPL CALCULATED.3IONS-SCNC: 8 MMOL/L (ref 4–13)
AST SERPL W P-5'-P-CCNC: 20 U/L (ref 13–39)
BASOPHILS # BLD AUTO: 0.05 THOUSANDS/ΜL (ref 0–0.1)
BASOPHILS NFR BLD AUTO: 1 % (ref 0–1)
BILIRUB SERPL-MCNC: 0.58 MG/DL (ref 0.2–1)
BUN SERPL-MCNC: 20 MG/DL (ref 5–25)
CALCIUM SERPL-MCNC: 10 MG/DL (ref 8.4–10.2)
CHLORIDE SERPL-SCNC: 103 MMOL/L (ref 96–108)
CHOLEST SERPL-MCNC: 163 MG/DL (ref ?–200)
CO2 SERPL-SCNC: 27 MMOL/L (ref 21–32)
CREAT SERPL-MCNC: 0.66 MG/DL (ref 0.6–1.3)
EOSINOPHIL # BLD AUTO: 0.23 THOUSAND/ΜL (ref 0–0.61)
EOSINOPHIL NFR BLD AUTO: 3 % (ref 0–6)
ERYTHROCYTE [DISTWIDTH] IN BLOOD BY AUTOMATED COUNT: 14.2 % (ref 11.6–15.1)
EST. AVERAGE GLUCOSE BLD GHB EST-MCNC: 140 MG/DL
GFR SERPL CREATININE-BSD FRML MDRD: 84 ML/MIN/1.73SQ M
GLUCOSE P FAST SERPL-MCNC: 124 MG/DL (ref 65–99)
HBA1C MFR BLD: 6.5 %
HCT VFR BLD AUTO: 41.4 % (ref 34.8–46.1)
HDLC SERPL-MCNC: 51 MG/DL
HGB BLD-MCNC: 13.2 G/DL (ref 11.5–15.4)
IMM GRANULOCYTES # BLD AUTO: 0.01 THOUSAND/UL (ref 0–0.2)
IMM GRANULOCYTES NFR BLD AUTO: 0 % (ref 0–2)
LDLC SERPL CALC-MCNC: 86 MG/DL (ref 0–100)
LYMPHOCYTES # BLD AUTO: 2.75 THOUSANDS/ΜL (ref 0.6–4.47)
LYMPHOCYTES NFR BLD AUTO: 35 % (ref 14–44)
MCH RBC QN AUTO: 28.9 PG (ref 26.8–34.3)
MCHC RBC AUTO-ENTMCNC: 31.9 G/DL (ref 31.4–37.4)
MCV RBC AUTO: 91 FL (ref 82–98)
MONOCYTES # BLD AUTO: 0.6 THOUSAND/ΜL (ref 0.17–1.22)
MONOCYTES NFR BLD AUTO: 8 % (ref 4–12)
NEUTROPHILS # BLD AUTO: 4.26 THOUSANDS/ΜL (ref 1.85–7.62)
NEUTS SEG NFR BLD AUTO: 53 % (ref 43–75)
NONHDLC SERPL-MCNC: 112 MG/DL
NRBC BLD AUTO-RTO: 0 /100 WBCS
PLATELET # BLD AUTO: 213 THOUSANDS/UL (ref 149–390)
PMV BLD AUTO: 12.2 FL (ref 8.9–12.7)
POTASSIUM SERPL-SCNC: 3.9 MMOL/L (ref 3.5–5.3)
PROT SERPL-MCNC: 7.5 G/DL (ref 6.4–8.4)
RBC # BLD AUTO: 4.56 MILLION/UL (ref 3.81–5.12)
SODIUM SERPL-SCNC: 138 MMOL/L (ref 135–147)
TRIGL SERPL-MCNC: 128 MG/DL (ref ?–150)
WBC # BLD AUTO: 7.9 THOUSAND/UL (ref 4.31–10.16)

## 2025-01-02 ENCOUNTER — RA CDI HCC (OUTPATIENT)
Dept: OTHER | Facility: HOSPITAL | Age: 80
End: 2025-01-02

## 2025-01-05 NOTE — PROGRESS NOTES
Name: Nicole Bledsoe      : 1945      MRN: [id number]  Encounter Provider: Donaldo Johnson MD  Encounter Date: 2025   Encounter department: [facility]  :  Assessment & Plan  Type 2 diabetes mellitus with microalbuminuria, without long-term current use of insulin (HCC)    Type 2 diabetes mellitus on Metformin  mg BID. 2024 A1c 6.5.   2024 Urine albumin 9.0 decreased from 20.9 not on ACE or ARB.  No neuropathy symptoms. Current with eye exam         Lab Results   Component Value Date    HGBA1C 6.5 (H) 2024       Orders:    Hemoglobin A1C    Albumin / creatinine urine ratio; Future    Essential hypertension    Blood pressures have been stable on Amlodipine 5 mg daily and Metoprolol ER 50 mg daily.    BP Readings from Last 3 Encounters:   25 122/78   24 118/72   24 124/78         Lab Results   Component Value Date     10/29/2015    SODIUM 138 2024    K 3.9 2024     2024    CO2 27 2024    ANIONGAP 8 10/29/2015    AGAP 8 2024    BUN 20 2024    CREATININE 0.66 2024    GLUC 127 10/16/2018    GLUF 124 (H) 2024    CALCIUM 10.0 2024    AST 20 2024    ALT 16 2024    ALKPHOS 61 2024    PROT 7.4 10/29/2015    TP 7.5 2024    BILITOT 0.69 10/29/2015    TBILI 0.58 2024    EGFR 84 2024     Lab Results   Component Value Date    WBC 7.90 2024    HGB 13.2 2024    HCT 41.4 2024    MCV 91 2024     2024         Orders:    CBC and differential    Comprehensive metabolic panel    Mixed hyperlipidemia    Hyperlipidemia mixed type-s/p lacunar infarct on Atorvastatin 40 mg 1/2 tablet daily and 1 fish oil capsule/day.     Lab Results   Component Value Date    CHOLESTEROL 163 2024    CHOLESTEROL 159 2024    CHOLESTEROL 161 2023     Lab Results   Component Value Date    HDL 51 2024    HDL 44 (L) 2024    HDL 49 (L)  11/21/2023     Lab Results   Component Value Date    TRIG 128 12/31/2024    TRIG 143 06/24/2024    TRIG 133 11/21/2023     Lab Results   Component Value Date    LDLCALC 86 12/31/2024     Lab Results   Component Value Date    [id number] 2.850 11/01/2022         Orders:    Lipid panel    Atypical chest pain    Episodes of non exertional chest tightness.      Orders:    Stress test only, exercise; Future    Gastroesophageal reflux disease without esophagitis     GERD stable off Pantoprazole 40 mg every other day.  No reflux.  No dysphagia.          Lacunar infarction (HCC)    06/2022 MRI brain Stable chronic lacunar infarct in the right basal ganglia. On  Plavix         Meningioma (HCC)    06/2022 MRI brain Stable left frontal meningioma.  No significant mass effect or edema.          Urinary incontinence, unspecified type     Overactive bladder with improvement on Ditropan at [facility]s.  Nocturia 1-2.  Occasional stress incontinence.           Medicare annual wellness visit, subsequent         OV 6 months with labs        History of Present Illness       CC Follow-up visit for chronic medical problems. .  Medications reviewed.            Review of Systems   Constitutional:  Negative for appetite change, chills, fatigue, fever and unexpected weight change.   HENT:  Positive for hearing loss (hearing aid left ear ). Negative for congestion, ear pain, rhinorrhea, sore throat and trouble swallowing.          09/2017 ENT evaluation for sudden hearing loss right ear.  MRI of brain at that time showed no evidence of acoustic neuroma.  Small retention cyst left maxillary sinus.  1.5 cm frontal meningioma.  She was seen and evaluated by neuro surgery.   Eyes:  Negative for visual disturbance.   Respiratory:  Negative for cough, shortness of breath and wheezing.    Cardiovascular:  Negative for chest pain, palpitations and leg swelling.        ER admission 06/2017 for accelerated hypertension.  07/2017 renal artery Dopplers no renal  "artery stenosis. 07/2017 stress echocardiogram no stress-induced ischemia.  No regional wall motion abnormalities.  Ejection fraction 60%.   Gastrointestinal:  Negative for abdominal pain, blood in stool, constipation, diarrhea, nausea and vomiting.        12/2015  Colonoscopy    Endocrine:          07/2017 DEXA scan T-score -1.7 left hip consistent with low bone mineral density. On vitamin D supplement.    Genitourinary:  Negative for difficulty urinating.   Musculoskeletal:  Negative for arthralgias and myalgias.        S/p left ankle fracture ORIF 10/2018. OA left knee X-rays 01/2018 showed moderate narrowing of the medial joint compartment and small joint effusion. S/p steroid injection left knee 01/2023 with relief.    Skin:  Negative for rash.   Neurological:  Negative for dizziness and headaches.   Hematological:  Negative for adenopathy. Does not bruise/bleed easily.   Psychiatric/Behavioral:  Negative for dysphoric mood and sleep disturbance.        Objective     /78 (BP Location: Left arm, Patient Position: Sitting, Cuff Size: Standard)   Pulse 62   Temp 97.6 °F (36.4 °C) (Temporal)   Resp 16   Ht 4' 9\" (1.448 m)   Wt 56.2 kg (124 lb)   SpO2 99%   BMI 26.83 kg/m²        Wt Readings from Last 3 Encounters:   01/06/25 56.2 kg (124 lb)   09/13/24 56.7 kg (125 lb)   06/27/24 56.7 kg (125 lb)          Physical Exam  Constitutional:       General: She is not in acute distress.  HENT:      Right Ear: Tympanic membrane and ear canal normal.      Left Ear: Tympanic membrane and ear canal normal.   Eyes:      General: No scleral icterus.     Extraocular Movements: Extraocular movements intact.      Conjunctiva/sclera: Conjunctivae normal.      Pupils: Pupils are equal, round, and reactive to light.   Neck:      Thyroid: No thyroid mass or thyromegaly.      Vascular: Normal carotid pulses. No carotid bruit.   Cardiovascular:      Rate and Rhythm: Normal rate and regular rhythm.      Pulses: no weak " pulses.           Dorsalis pedis pulses are 2+ on the right side and 2+ on the left side.        Posterior tibial pulses are 2+ on the right side and 2+ on the left side.      Heart sounds: No murmur heard.     No gallop.   Pulmonary:      Effort: Pulmonary effort is normal.      Breath sounds: Normal breath sounds. No wheezing or rales.   Musculoskeletal:      Right lower leg: No edema.      Left lower leg: No edema.   Feet:      Right foot:      Skin integrity: No ulcer, skin breakdown, erythema, warmth, callus or dry skin.      Left foot:      Skin integrity: No ulcer, skin breakdown, erythema, warmth, callus or dry skin.   Lymphadenopathy:      Cervical: No cervical adenopathy.      Upper Body:      Right upper body: No supraclavicular adenopathy.      Left upper body: No supraclavicular adenopathy.   Skin:     Findings: No rash.   Neurological:      General: No focal deficit present.      Mental Status: She is alert and oriented to person, place, and time.   Psychiatric:         Mood and Affect: Mood normal.       Patient's shoes and socks removed.    Right Foot/Ankle   Right Foot Inspection  Skin Exam: skin normal and skin intact. No dry skin, no warmth, no callus, no erythema, no maceration, no abnormal color, no pre-ulcer, no ulcer and no callus.     Toe Exam: ROM and strength within normal limits. No swelling, no tenderness, erythema and  no right toe deformity    Sensory   Monofilament testing: intact    Vascular  Capillary refills: < 3 seconds  The right DP pulse is 2+. The right PT pulse is 2+.     Left Foot/Ankle  Left Foot Inspection  Skin Exam: skin normal and skin intact. No dry skin, no warmth, no erythema, no maceration, normal color, no pre-ulcer, no ulcer and no callus.     Toe Exam: ROM and strength within normal limits. No swelling, no tenderness, no erythema and no left toe deformity.     Sensory   Monofilament testing: intact    Vascular  Capillary refills: < 3 seconds  The left DP pulse is  2+. The left PT pulse is 2+.     Assign Risk Category  No deformity present  No loss of protective sensation  No weak pulses  Risk: 0

## 2025-01-05 NOTE — ASSESSMENT & PLAN NOTE
Blood pressures have been stable on Amlodipine 5 mg daily and Metoprolol ER 50 mg daily.    BP Readings from Last 3 Encounters:   01/06/25 122/78   06/27/24 118/72   02/12/24 124/78         Lab Results   Component Value Date     10/29/2015    SODIUM 138 12/31/2024    K 3.9 12/31/2024     12/31/2024    CO2 27 12/31/2024    ANIONGAP 8 10/29/2015    AGAP 8 12/31/2024    BUN 20 12/31/2024    CREATININE 0.66 12/31/2024    GLUC 127 10/16/2018    GLUF 124 (H) 12/31/2024    CALCIUM 10.0 12/31/2024    AST 20 12/31/2024    ALT 16 12/31/2024    ALKPHOS 61 12/31/2024    PROT 7.4 10/29/2015    TP 7.5 12/31/2024    BILITOT 0.69 10/29/2015    TBILI 0.58 12/31/2024    EGFR 84 12/31/2024     Lab Results   Component Value Date    WBC 7.90 12/31/2024    HGB 13.2 12/31/2024    HCT 41.4 12/31/2024    MCV 91 12/31/2024     12/31/2024         Orders:    CBC and differential    Comprehensive metabolic panel

## 2025-01-05 NOTE — ASSESSMENT & PLAN NOTE
Hyperlipidemia mixed type-s/p lacunar infarct on Atorvastatin 40 mg 1/2 tablet daily and 1 fish oil capsule/day.     Lab Results   Component Value Date    CHOLESTEROL 163 12/31/2024    CHOLESTEROL 159 06/24/2024    CHOLESTEROL 161 11/21/2023     Lab Results   Component Value Date    HDL 51 12/31/2024    HDL 44 (L) 06/24/2024    HDL 49 (L) 11/21/2023     Lab Results   Component Value Date    TRIG 128 12/31/2024    TRIG 143 06/24/2024    TRIG 133 11/21/2023     Lab Results   Component Value Date    LDLCALC 86 12/31/2024     Lab Results   Component Value Date    [id number] 2.850 11/01/2022         Orders:    Lipid panel

## 2025-01-05 NOTE — ASSESSMENT & PLAN NOTE
Type 2 diabetes mellitus on Metformin  mg BID. 12/2024 A1c 6.5.   06/2024 Urine albumin 9.0 decreased from 20.9 not on ACE or ARB.  No neuropathy symptoms. Current with eye exam         Lab Results   Component Value Date    HGBA1C 6.5 (H) 12/31/2024       Orders:    Hemoglobin A1C    Albumin / creatinine urine ratio; Future

## 2025-01-06 ENCOUNTER — OFFICE VISIT (OUTPATIENT)
Dept: FAMILY MEDICINE CLINIC | Facility: CLINIC | Age: 80
End: 2025-01-06
Payer: COMMERCIAL

## 2025-01-06 VITALS
SYSTOLIC BLOOD PRESSURE: 122 MMHG | DIASTOLIC BLOOD PRESSURE: 78 MMHG | OXYGEN SATURATION: 99 % | BODY MASS INDEX: 26.75 KG/M2 | WEIGHT: 124 LBS | TEMPERATURE: 97.6 F | HEART RATE: 62 BPM | HEIGHT: 57 IN | RESPIRATION RATE: 16 BRPM

## 2025-01-06 DIAGNOSIS — E78.2 MIXED HYPERLIPIDEMIA: ICD-10-CM

## 2025-01-06 DIAGNOSIS — R07.89 ATYPICAL CHEST PAIN: ICD-10-CM

## 2025-01-06 DIAGNOSIS — R32 URINARY INCONTINENCE, UNSPECIFIED TYPE: ICD-10-CM

## 2025-01-06 DIAGNOSIS — I63.81 LACUNAR INFARCTION (HCC): ICD-10-CM

## 2025-01-06 DIAGNOSIS — D32.9 MENINGIOMA (HCC): ICD-10-CM

## 2025-01-06 DIAGNOSIS — I10 ESSENTIAL HYPERTENSION: ICD-10-CM

## 2025-01-06 DIAGNOSIS — K21.9 GASTROESOPHAGEAL REFLUX DISEASE WITHOUT ESOPHAGITIS: ICD-10-CM

## 2025-01-06 DIAGNOSIS — E11.29 TYPE 2 DIABETES MELLITUS WITH MICROALBUMINURIA, WITHOUT LONG-TERM CURRENT USE OF INSULIN (HCC): Primary | ICD-10-CM

## 2025-01-06 DIAGNOSIS — Z00.00 MEDICARE ANNUAL WELLNESS VISIT, SUBSEQUENT: ICD-10-CM

## 2025-01-06 DIAGNOSIS — R80.9 TYPE 2 DIABETES MELLITUS WITH MICROALBUMINURIA, WITHOUT LONG-TERM CURRENT USE OF INSULIN (HCC): Primary | ICD-10-CM

## 2025-01-06 PROCEDURE — 99214 OFFICE O/P EST MOD 30 MIN: CPT | Performed by: FAMILY MEDICINE

## 2025-01-06 PROCEDURE — G0439 PPPS, SUBSEQ VISIT: HCPCS | Performed by: FAMILY MEDICINE

## 2025-01-06 NOTE — PATIENT INSTRUCTIONS

## 2025-01-06 NOTE — PROGRESS NOTES
Name: Nicole Bledsoe      : 1945      MRN: [id number]  Encounter Provider: Donaldo Johnson MD  Encounter Date: 2025   Encounter department: [facility]    Assessment & Plan  Type 2 diabetes mellitus with microalbuminuria, without long-term current use of insulin (HCC)    Lab Results   Component Value Date    HGBA1C 6.5 (H) 2024       Orders:    Hemoglobin A1C    Albumin / creatinine urine ratio; Future    Essential hypertension    Orders:    CBC and differential    Comprehensive metabolic panel    Mixed hyperlipidemia    Orders:    Lipid panel    Atypical chest pain    Orders:    Stress test only, exercise; Future    Gastroesophageal reflux disease without esophagitis         Lacunar infarction (HCC)         Meningioma (HCC)         Urinary incontinence, unspecified type         Medicare annual wellness visit, subsequent           Depression Screening and Follow-up Plan: Patient was screened for depression during today's encounter. They screened negative with a PHQ-2 score of 0.    Urinary Incontinence Plan of Care: counseling topics discussed: limiting fluid intake 3-4 hours before bed.       Preventive health issues were discussed with patient, and age appropriate screening tests were ordered as noted in patient's After Visit Summary. Personalized health advice and appropriate referrals for health education or preventive services given if needed, as noted in patient's After Visit Summary.    History of Present Illness     HPI   Patient Care Team:  Donaldo Johnson MD as PCP - General  Donaldo Johnson MD as PCP - PCP-[facility]  Darryl MD Alisha Ritchie PA-C Barry Eugene Herman, MD (Gastroenterology)    Review of Systems  Medical History Reviewed by provider this encounter:       Annual Wellness Visit Questionnaire   Nicole is here for her Subsequent Wellness visit.     Health Risk Assessment:   Patient rates overall health as  excellent. Patient feels that their physical health rating is much better. Patient is very satisfied with their life. Eyesight was rated as same. Hearing was rated as same. Patient feels that their emotional and mental health rating is much better. Patients states they are never, rarely angry. Patient states they are never, rarely unusually tired/fatigued. Pain experienced in the last 7 days has been none. Patient states that she has experienced no weight loss or gain in last 6 months.     Depression Screening:   PHQ-2 Score: 0      Fall Risk Screening:   In the past year, patient has experienced: history of falling in past year    Number of falls: 1  Injured during fall?: No    Feels unsteady when standing or walking?: No    Worried about falling?: No      Urinary Incontinence Screening:   Patient has leaked urine accidently in the last six months.     Home Safety:  Patient does not have trouble with stairs inside or outside of their home. Patient has working smoke alarms and has working carbon monoxide detector. Home safety hazards include: none.     Nutrition:   Current diet is Regular, Diabetic and No Added Salt. Tries to stay away from white bread and sugar and junk     Medications:   Patient is currently taking over-the-counter supplements. OTC medications include: see medication list. Patient is able to manage medications.     Activities of Daily Living (ADLs)/Instrumental Activities of Daily Living (IADLs):   Walk and transfer into and out of bed and chair?: Yes  Dress and groom yourself?: Yes    Bathe or shower yourself?: Yes    Feed yourself? Yes  Do your laundry/housekeeping?: Yes  Manage your money, pay your bills and track your expenses?: Yes  Make your own meals?: Yes    Do your own shopping?: Yes    Previous Hospitalizations:   Any hospitalizations or ED visits within the last 12 months?: No      PREVENTIVE SCREENINGS      Cardiovascular Screening:    General: Screening Not Indicated and History Lipid  "Disorder      Diabetes Screening:     General: Screening Not Indicated and History Diabetes      Breast Cancer Screening:     General: Screening Current      Cervical Cancer Screening:    General: Screening Not Indicated      Lung Cancer Screening:     General: Screening Not Indicated      Hepatitis C Screening:    General: Screening Current    Screening, Brief Intervention, and Referral to Treatment (SBIRT)    Screening  Typical number of drinks in a day: 0  Typical number of drinks in a week: 0  Interpretation: Low risk drinking behavior.    Single Item Drug Screening:  How often have you used an illegal drug (including marijuana) or a prescription medication for non-medical reasons in the past year? never    Single Item Drug Screen Score: 0  Interpretation: Negative screen for possible drug use disorder    Social Drivers of Health     Financial Resource Strain: Low Risk  (11/27/2023)    Overall Financial Resource Strain (CARDIA)     Difficulty of Paying Living Expenses: Not hard at all   Food Insecurity: No Food Insecurity (1/6/2025)    Hunger Vital Sign     Worried About Running Out of Food in the Last Year: Never true     Ran Out of Food in the Last Year: Never true   Transportation Needs: No Transportation Needs (1/6/2025)    PRAPARE - Transportation     Lack of Transportation (Medical): No     Lack of Transportation (Non-Medical): No   Housing Stability: Unknown (1/6/2025)    Housing Stability Vital Sign     Unable to Pay for Housing in the Last Year: No     Number of Times Moved in the Last Year: 0   Utilities: Not At Risk (1/6/2025)    [facility] Utilities     Threatened with loss of utilities: No     No results found.    Objective   /78 (BP Location: Left arm, Patient Position: Sitting, Cuff Size: Standard)   Pulse 62   Temp 97.6 °F (36.4 °C) (Temporal)   Resp 16   Ht 4' 9\" (1.448 m)   Wt 56.2 kg (124 lb)   SpO2 99%   BMI 26.83 kg/m²     Physical Exam    "

## 2025-01-06 NOTE — ASSESSMENT & PLAN NOTE
Lab Results   Component Value Date    HGBA1C 6.5 (H) 12/31/2024       Orders:    Hemoglobin A1C    Albumin / creatinine urine ratio; Future

## 2025-01-13 DIAGNOSIS — I10 ESSENTIAL HYPERTENSION: ICD-10-CM

## 2025-01-14 RX ORDER — AMLODIPINE BESYLATE 5 MG/1
5 TABLET ORAL EVERY EVENING
Qty: 90 TABLET | Refills: 1 | Status: SHIPPED | OUTPATIENT
Start: 2025-01-14

## 2025-01-28 ENCOUNTER — OFFICE VISIT (OUTPATIENT)
Dept: FAMILY MEDICINE CLINIC | Facility: CLINIC | Age: 80
End: 2025-01-28
Payer: COMMERCIAL

## 2025-01-28 VITALS
DIASTOLIC BLOOD PRESSURE: 78 MMHG | HEIGHT: 57 IN | SYSTOLIC BLOOD PRESSURE: 124 MMHG | OXYGEN SATURATION: 98 % | RESPIRATION RATE: 16 BRPM | BODY MASS INDEX: 26.64 KG/M2 | WEIGHT: 123.5 LBS | TEMPERATURE: 97.8 F | HEART RATE: 68 BPM

## 2025-01-28 DIAGNOSIS — B34.9 VIRAL ILLNESS: Primary | ICD-10-CM

## 2025-01-28 PROCEDURE — G2211 COMPLEX E/M VISIT ADD ON: HCPCS

## 2025-01-28 PROCEDURE — 99213 OFFICE O/P EST LOW 20 MIN: CPT

## 2025-01-28 NOTE — PROGRESS NOTES
"Name: Nicole Bledsoe      : 1945      MRN: [id number]  Encounter Provider: Wilmer Rosen DO  Encounter Date: 2025   Encounter department: [facility]  :  Assessment & Plan  Viral illness  Symptoms of congestion and mild cough over the weekend, feels better with OTC cold medication and saline nasal spray. Suspect viral cause at this time would recommend continue supportive care.  Return precautions reviewed for any new onset fevers, shortness of breath, chest pain, etc. patient understands agrees to plan               History of Present Illness   HPI    Patient reports sinus congestion x 2 days with associated postnasal drip and mild cough.  No fevers or chills or shortness of breath.  Has been taking OTC cold medication and saline nasal spray with some relief.    Review of Systems   Constitutional:  Negative for chills and fever.   HENT:  Positive for congestion and postnasal drip. Negative for rhinorrhea, sinus pressure and sinus pain.    Eyes:  Negative for visual disturbance.   Respiratory:  Negative for cough and shortness of breath.    Cardiovascular:  Negative for chest pain and palpitations.   Gastrointestinal:  Negative for abdominal pain, constipation, diarrhea, nausea and vomiting.   Genitourinary:  Negative for dysuria.   Musculoskeletal:  Negative for arthralgias.   Skin:  Negative for rash.   Neurological:  Negative for dizziness, light-headedness and headaches.       Objective   /78 (BP Location: Left arm, Patient Position: Sitting, Cuff Size: Standard)   Pulse 68   Temp 97.8 °F (36.6 °C) (Temporal)   Resp 16   Ht 4' 9\" (1.448 m)   Wt 56 kg (123 lb 8 oz)   SpO2 98%   BMI 26.73 kg/m²      Physical Exam  Vitals reviewed.   Constitutional:       Appearance: Normal appearance.   HENT:      Head: Normocephalic and atraumatic.      Right Ear: External ear normal.      Left Ear: External ear normal.      Nose: Congestion present.      Mouth/Throat:      " Pharynx: Oropharynx is clear.   Eyes:      Extraocular Movements: Extraocular movements intact.      Conjunctiva/sclera: Conjunctivae normal.   Cardiovascular:      Rate and Rhythm: Normal rate and regular rhythm.      Pulses: Normal pulses.      Heart sounds: Normal heart sounds.   Pulmonary:      Effort: Pulmonary effort is normal. No respiratory distress.      Breath sounds: Normal breath sounds. No wheezing, rhonchi or rales.   Abdominal:      Palpations: Abdomen is soft.   Musculoskeletal:      Cervical back: Neck supple.      Right lower leg: No edema.      Left lower leg: No edema.   Skin:     General: Skin is warm.   Neurological:      Mental Status: She is alert and oriented to person, place, and time.   Psychiatric:         Mood and Affect: Mood normal.         Behavior: Behavior normal.         Thought Content: Thought content normal.         Judgment: Judgment normal.

## 2025-02-03 DIAGNOSIS — E78.2 MIXED HYPERLIPIDEMIA: ICD-10-CM

## 2025-02-03 RX ORDER — ATORVASTATIN CALCIUM 40 MG/1
40 TABLET, FILM COATED ORAL DAILY
Qty: 90 TABLET | Refills: 1 | Status: SHIPPED | OUTPATIENT
Start: 2025-02-03

## 2025-02-27 ENCOUNTER — HOSPITAL ENCOUNTER (OUTPATIENT)
Dept: NON INVASIVE DIAGNOSTICS | Facility: HOSPITAL | Age: 80
Discharge: HOME | End: 2025-02-27
Attending: FAMILY MEDICINE
Payer: COMMERCIAL

## 2025-02-27 VITALS — WEIGHT: 123 LBS | HEIGHT: 57 IN | BODY MASS INDEX: 26.54 KG/M2

## 2025-02-27 DIAGNOSIS — R07.89 ATYPICAL CHEST PAIN: ICD-10-CM

## 2025-02-27 LAB
CHEST PAIN STATEMENT: NORMAL
MAX DIASTOLIC BP: 74 MMHG
MAX HR PERCENT: 104 %
MAX HR: 148 BPM
MAX PREDICTED HEART RATE: 141 BPM
PROTOCOL NAME: NORMAL
RATE PRESSURE PRODUCT: NORMAL
REASON FOR TERMINATION: NORMAL
SL CV STRESS RECOVERY BP: NORMAL MMHG
SL CV STRESS RECOVERY HR: 87 BPM
SL CV STRESS RECOVERY O2 SAT: 98 %
SL CV STRESS STAGE REACHED: 2
STRESS ANGINA INDEX: 0
STRESS BASELINE BP: NORMAL MMHG
STRESS BASELINE HR: 87 BPM
STRESS O2 SAT REST: 98 %
STRESS PEAK HR: 148 BPM
STRESS POST ESTIMATED WORKLOAD: 7 METS
STRESS POST EXERCISE DUR MIN: 6 MIN
STRESS POST EXERCISE DUR MIN: 6 MIN
STRESS POST EXERCISE DUR SEC: 0 SEC
STRESS POST EXERCISE DUR SEC: 0 SEC
STRESS POST O2 SAT PEAK: 98 %
STRESS POST PEAK BP: 164 MMHG
STRESS POST PEAK HR: 148 BPM
STRESS POST PEAK SYSTOLIC BP: 164 MMHG
TARGET HR FORMULA: NORMAL
TEST INDICATION: NORMAL

## 2025-02-27 PROCEDURE — 93017 CV STRESS TEST TRACING ONLY: CPT

## 2025-02-27 PROCEDURE — 93016 CV STRESS TEST SUPVJ ONLY: CPT | Performed by: INTERNAL MEDICINE

## 2025-02-27 PROCEDURE — 93018 CV STRESS TEST I&R ONLY: CPT | Performed by: INTERNAL MEDICINE

## 2025-03-19 ENCOUNTER — TELEPHONE (OUTPATIENT)
Dept: FAMILY MEDICINE CLINIC | Facility: CLINIC | Age: 80
End: 2025-03-19

## 2025-03-19 NOTE — TELEPHONE ENCOUNTER
Patient asking for results of her stress test 3 months ago and requesting an order for a mammogram

## 2025-03-20 DIAGNOSIS — I49.3 PVC'S (PREMATURE VENTRICULAR CONTRACTIONS): Primary | ICD-10-CM

## 2025-03-20 NOTE — TELEPHONE ENCOUNTER
Called patient no response left message to call the office back was calling to give message please see down below

## 2025-04-07 DIAGNOSIS — E11.29 TYPE 2 DIABETES MELLITUS WITH MICROALBUMINURIA, WITHOUT LONG-TERM CURRENT USE OF INSULIN (HCC): ICD-10-CM

## 2025-04-07 DIAGNOSIS — R80.9 TYPE 2 DIABETES MELLITUS WITH MICROALBUMINURIA, WITHOUT LONG-TERM CURRENT USE OF INSULIN (HCC): ICD-10-CM

## 2025-04-07 RX ORDER — METFORMIN HYDROCHLORIDE 500 MG/1
TABLET, EXTENDED RELEASE ORAL
Qty: 180 TABLET | Refills: 1 | Status: SHIPPED | OUTPATIENT
Start: 2025-04-07

## 2025-04-30 ENCOUNTER — TELEPHONE (OUTPATIENT)
Dept: OTHER | Facility: OTHER | Age: 80
End: 2025-04-30

## 2025-05-01 ENCOUNTER — HOSPITAL ENCOUNTER (OUTPATIENT)
Dept: NON INVASIVE DIAGNOSTICS | Facility: CLINIC | Age: 80
Discharge: HOME | End: 2025-05-01
Payer: COMMERCIAL

## 2025-05-01 ENCOUNTER — HOSPITAL ENCOUNTER (OUTPATIENT)
Dept: RADIOLOGY | Facility: MEDICAL CENTER | Age: 80
Discharge: HOME | End: 2025-05-01
Payer: COMMERCIAL

## 2025-05-01 VITALS
BODY MASS INDEX: 26.54 KG/M2 | WEIGHT: 123 LBS | DIASTOLIC BLOOD PRESSURE: 78 MMHG | SYSTOLIC BLOOD PRESSURE: 124 MMHG | HEART RATE: 68 BPM | HEIGHT: 57 IN

## 2025-05-01 VITALS — WEIGHT: 123 LBS | BODY MASS INDEX: 26.54 KG/M2 | HEIGHT: 57 IN

## 2025-05-01 DIAGNOSIS — I49.3 PVC'S (PREMATURE VENTRICULAR CONTRACTIONS): ICD-10-CM

## 2025-05-01 DIAGNOSIS — Z12.31 SCREENING MAMMOGRAM, ENCOUNTER FOR: ICD-10-CM

## 2025-05-01 LAB
AORTIC ROOT: 2.9 CM
AORTIC VALVE MEAN VELOCITY: 11.5 M/S
ASCENDING AORTA: 3.1 CM
AV LVOT MEAN GRADIENT: 1 MMHG
AV LVOT PEAK GRADIENT: 3 MMHG
AV MEAN PRESS GRAD SYS DOP V1V2: 6 MMHG
AV PEAK GRADIENT: 11 MMHG
AV VELOCITY RATIO: 0.47
AV VMAX SYS DOP: 1.63 M/S
BSA FOR ECHO PROCEDURE: 1.46 M2
DOP CALC AO VTI: 38.94 CM
DOP CALC LVOT PEAK VEL VTI: 18.36 CM
DOP CALC LVOT PEAK VEL: 0.82 M/S
E WAVE DECELERATION TIME: 162 MS
E/A RATIO: 0.81
FRACTIONAL SHORTENING: 26 (ref 28–44)
INTERVENTRICULAR SEPTUM IN DIASTOLE (PARASTERNAL SHORT AXIS VIEW): 1.1 CM
INTERVENTRICULAR SEPTUM: 1.1 CM (ref 0.6–1.1)
LAAS-AP2: 21.1 CM2
LAAS-AP4: 13.8 CM2
LEFT ATRIUM AREA SYSTOLE SINGLE PLANE A4C: 14.8 CM2
LEFT ATRIUM SIZE: 4 CM
LEFT ATRIUM VOLUME (MOD BIPLANE): 45 ML
LEFT ATRIUM VOLUME INDEX (MOD BIPLANE): 30.8 ML/M2
LEFT INTERNAL DIMENSION IN SYSTOLE: 3.1 CM (ref 2.1–4)
LEFT VENTRICULAR INTERNAL DIMENSION IN DIASTOLE: 4.2 CM (ref 3.5–6)
LEFT VENTRICULAR POSTERIOR WALL IN END DIASTOLE: 0.8 CM
LEFT VENTRICULAR STROKE VOLUME: 39 ML
LV EF US.2D.A4C+ESTIMATED: 60 %
LVSV (TEICH): 39 ML
MV E'TISSUE VEL-SEP: 7 CM/S
MV PEAK A VEL: 0.89 M/S
MV PEAK E VEL: 72 CM/S
MV STENOSIS PRESSURE HALF TIME: 47 MS
MV VALVE AREA P 1/2 METHOD: 4.68
RA PRESSURE ESTIMATED: 8 MMHG
RIGHT ATRIAL 2D VOLUME: 30 ML
RIGHT ATRIUM AREA SYSTOLE A4C: 13.4 CM2
RIGHT VENTRICLE ID DIMENSION: 3.2 CM
RV PSP: 33 MMHG
SL CV LEFT ATRIUM LENGTH A2C: 5.6 CM
SL CV LV EF: 55
SL CV PED ECHO LEFT VENTRICLE DIASTOLIC VOLUME (MOD BIPLANE) 2D: 77 ML
SL CV PED ECHO LEFT VENTRICLE SYSTOLIC VOLUME (MOD BIPLANE) 2D: 38 ML
TR MAX PG: 25 MMHG
TR PEAK VELOCITY: 2.5 M/S
TRICUSPID ANNULAR PLANE SYSTOLIC EXCURSION: 2.4 CM
TRICUSPID VALVE PEAK REGURGITATION VELOCITY: 2.51 M/S

## 2025-05-01 PROCEDURE — 77063 BREAST TOMOSYNTHESIS BI: CPT

## 2025-05-01 PROCEDURE — 93306 TTE W/DOPPLER COMPLETE: CPT | Performed by: INTERNAL MEDICINE

## 2025-05-01 PROCEDURE — 93306 TTE W/DOPPLER COMPLETE: CPT

## 2025-05-01 PROCEDURE — 77067 SCR MAMMO BI INCL CAD: CPT

## 2025-05-01 NOTE — TELEPHONE ENCOUNTER
Pt reached answering service, she called to confirm the time of her echo on 5/1 and if there was any prep--read instructions from appt desk

## 2025-05-02 ENCOUNTER — OFFICE VISIT (OUTPATIENT)
Dept: OBGYN CLINIC | Facility: CLINIC | Age: 80
End: 2025-05-02
Payer: COMMERCIAL

## 2025-05-02 ENCOUNTER — APPOINTMENT (OUTPATIENT)
Dept: RADIOLOGY | Facility: AMBULARY SURGERY CENTER | Age: 80
End: 2025-05-02
Attending: ORTHOPAEDIC SURGERY
Payer: COMMERCIAL

## 2025-05-02 VITALS — WEIGHT: 126 LBS | HEIGHT: 57 IN | BODY MASS INDEX: 27.18 KG/M2

## 2025-05-02 DIAGNOSIS — M17.11 PRIMARY OSTEOARTHRITIS OF RIGHT KNEE: ICD-10-CM

## 2025-05-02 DIAGNOSIS — M25.561 RIGHT KNEE PAIN, UNSPECIFIED CHRONICITY: Primary | ICD-10-CM

## 2025-05-02 DIAGNOSIS — M25.561 RIGHT KNEE PAIN, UNSPECIFIED CHRONICITY: ICD-10-CM

## 2025-05-02 PROCEDURE — 20610 DRAIN/INJ JOINT/BURSA W/O US: CPT | Performed by: PHYSICIAN ASSISTANT

## 2025-05-02 PROCEDURE — 99213 OFFICE O/P EST LOW 20 MIN: CPT | Performed by: ORTHOPAEDIC SURGERY

## 2025-05-02 PROCEDURE — 73562 X-RAY EXAM OF KNEE 3: CPT

## 2025-05-02 RX ORDER — KETOROLAC TROMETHAMINE 30 MG/ML
30 INJECTION, SOLUTION INTRAMUSCULAR; INTRAVENOUS
Status: COMPLETED | OUTPATIENT
Start: 2025-05-02 | End: 2025-05-02

## 2025-05-02 RX ORDER — BUPIVACAINE HYDROCHLORIDE 2.5 MG/ML
2 INJECTION, SOLUTION INFILTRATION; PERINEURAL
Status: COMPLETED | OUTPATIENT
Start: 2025-05-02 | End: 2025-05-02

## 2025-05-02 RX ORDER — METHYLPREDNISOLONE ACETATE 40 MG/ML
2 INJECTION, SUSPENSION INTRA-ARTICULAR; INTRALESIONAL; INTRAMUSCULAR; SOFT TISSUE
Status: COMPLETED | OUTPATIENT
Start: 2025-05-02 | End: 2025-05-02

## 2025-05-02 RX ADMIN — BUPIVACAINE HYDROCHLORIDE 2 ML: 2.5 INJECTION, SOLUTION INFILTRATION; PERINEURAL at 12:15

## 2025-05-02 RX ADMIN — KETOROLAC TROMETHAMINE 30 MG: 30 INJECTION, SOLUTION INTRAMUSCULAR; INTRAVENOUS at 12:15

## 2025-05-02 RX ADMIN — METHYLPREDNISOLONE ACETATE 2 ML: 40 INJECTION, SUSPENSION INTRA-ARTICULAR; INTRALESIONAL; INTRAMUSCULAR; SOFT TISSUE at 12:15

## 2025-05-02 NOTE — PROGRESS NOTES
"Name: Nicole Bledsoe      : 1945      MRN: [id number]  Encounter Provider: Leighann Storey MD  Encounter Date: 2025   Encounter department: [facility]  :  Assessment & Plan  Right knee pain, unspecified chronicity    Orders:  •  XR knee 3 vw right non injury; Future    Primary osteoarthritis of right knee  Weightbearing as tolerated right lower extremity  Right knee intra-articular cortisone Toradol injection administered as noted above  Patient advised should they develop any increasing pain, redness, drainage, numbness, tingling or swelling surrounding the injection site, they are to contact our office or present to the emergency department.  Advised patient home range of motion stretching strengthening exercises  Over-the-counter pain medication as needed  Follow-up in 3 to 4 months time repeat evaluation right knee           History of Present Illness   HPI  Nicole Bledsoe is a 79 y.o. female who presents she presents office a regarding right knee pain.  She has been having right knee pain in the past over the past 2 weeks and having increasing pain and swelling in her right knee.  Pain is aching nature worse with activity worse weightbearing mildly relieved with rest denies any instability states the pain is worse in the morning relieved with motion Voltaren gel and Tylenol.  She denies any radiation pain denies any weakness in her right lower extremity.     Objective   Ht 4' 9\" (1.448 m)   Wt 57.2 kg (126 lb)   BMI 27.27 kg/m²                Review Of Systems:   Skin: Normal  Neuro: See HPI  Musculoskeletal: See HPI  All other systems reviewed and are negative    Past Medical History:   Past Medical History:   Diagnosis Date   • Endometriosis    • GERD (gastroesophageal reflux disease)    • Hyperlipidemia    • Hypertension     malignant / last assessed 17   • IFG (impaired fasting glucose)     last assessed 17   • Stroke (HCC) 2018    no " residual   • Sudden-onset sensorineural hearing loss 12/6/2017       Past Surgical History:   Past Surgical History:   Procedure Laterality Date   • BUNIONECTOMY Right 2010   • COLONOSCOPY      complete   • HYSTERECTOMY      total abdominal with b/l ovary removal   • KNEE ARTHROSCOPY Right     menisectomy   • KNEE CARTILAGE SURGERY Right     theraputic   • IL OPEN TX DISTAL FIBULAR FRACTURE LAT MALLEOLUS Left 10/18/2018    Procedure: OPEN REDUCTION INTERNAL FIXATION LEFT ANKLE LATERAL MALLEOLUS;  Surgeon: Pantera Sy MD;  Location: [facility];  Service: Orthopedics       Family History:  Family history reviewed and non-contributory  Family History   Problem Relation Age of Onset   • Early death Mother    • Heart attack Mother         acute MI   • No Known Problems Father    • Heart attack Sister    • Heart disease Sister         CABG   • Coronary artery disease Sister    • Breast cancer Sister    • No Known Problems Sister    • No Known Problems Daughter    • No Known Problems Daughter    • No Known Problems Daughter    • No Known Problems Maternal Grandmother    • No Known Problems Maternal Grandfather    • No Known Problems Paternal Grandmother    • No Known Problems Paternal Grandfather    • Diabetes Brother    • Cancer Brother 54   • No Known Problems Son    • Cancer Family         malignant neoplasm of urinary bladder         Social History:  Social History     Socioeconomic History   • Marital status:      Spouse name: None   • Number of children: 4   • Years of education: less than high school   • Highest education level: None   Occupational History   • Occupation: retired   Tobacco Use   • Smoking status: Never   • Smokeless tobacco: Never   Vaping Use   • Vaping status: Never Used   Substance and Sexual Activity   • Alcohol use: No   • Drug use: No   • Sexual activity: Not Currently     Partners: Female     Birth control/protection: None   Other Topics Concern   • None   Social History Narrative     Daily coffee consumption: 2 cp/day    Exercising regularly    living alone     Social Drivers of Health     Financial Resource Strain: Low Risk  (11/27/2023)    Overall Financial Resource Strain (CARDIA)    • Difficulty of Paying Living Expenses: Not hard at all   Food Insecurity: No Food Insecurity (1/6/2025)    Hunger Vital Sign    • Worried About Running Out of Food in the Last Year: Never true    • Ran Out of Food in the Last Year: Never true   Transportation Needs: No Transportation Needs (1/6/2025)    PRAPARE - Transportation    • Lack of Transportation (Medical): No    • Lack of Transportation (Non-Medical): No   Physical Activity: Not on file   Stress: Not on file   Social Connections: Not on file   Intimate Partner Violence: Not on file   Housing Stability: Unknown (1/6/2025)    Housing Stability Vital Sign    • Unable to Pay for Housing in the Last Year: No    • Number of Times Moved in the Last Year: 0    • Homeless in the Last Year: Not on file       Allergies:   Allergies   Allergen Reactions   • Oxycodone Nausea Only     Vomits from medication every time  She likes tylenol with codeine, that does not make her sick   • Penicillins Rash       Labs:  0   Lab Value Date/Time    HCT 41.4 12/31/2024 0956    HCT 42.8 06/24/2024 1146    HCT 43.7 11/21/2023 1030    HCT 43.4 10/29/2015 0943    HCT 44.0 04/24/2015 0850    HCT 43.3 10/17/2014 0725    HGB 13.2 12/31/2024 0956    HGB 13.6 06/24/2024 1146    HGB 14.1 11/21/2023 1030    HGB 14.8 10/29/2015 0943    HGB 14.2 04/24/2015 0850    HGB 14.1 10/17/2014 0725    INR 1.04 10/16/2018 1139    WBC 7.90 12/31/2024 0956    WBC 7.33 06/24/2024 1146    WBC 6.83 11/21/2023 1030    WBC 6.10 10/29/2015 0943    WBC 5.67 04/24/2015 0850    WBC 6.21 10/17/2014 0725    ESR 23 (H) 12/05/2017 0728       Meds:    Current Outpatient Medications:   •  acetaminophen (TYLENOL) 500 mg tablet, Take 500 mg by mouth every 6 (six) hours as needed for mild pain, Disp: , Rfl:   •   amLODIPine (NORVASC) 5 mg tablet, take 1 tablet by mouth every evening, Disp: 90 tablet, Rfl: 1  •  atorvastatin (LIPITOR) 40 mg tablet, take 1 tablet by mouth once daily, Disp: 90 tablet, Rfl: 1  •  cholecalciferol (VITAMIN D3) 1,000 units tablet, Take 2,000 Units by mouth daily, Disp: , Rfl:   •  clopidogrel (PLAVIX) 75 mg tablet, take 1 tablet by mouth once daily, Disp: 90 tablet, Rfl: 1  •  metFORMIN (GLUCOPHAGE-XR) 500 mg 24 hr tablet, take 2 tablets by mouth daily with dinner, Disp: 180 tablet, Rfl: 1  •  metoprolol succinate (TOPROL-XL) 50 mg 24 hr tablet, Take 1 tablet (50 mg total) by mouth daily, Disp: 90 tablet, Rfl: 1  •  Multiple Vitamin (MULTIVITAMIN) tablet, Take 1 tablet by mouth daily, Disp: , Rfl:   •  oxybutynin (DITROPAN-XL) 5 mg 24 hr tablet, take 1 tablet by mouth once daily, Disp: 90 tablet, Rfl: 1    Body mass index is 27.27 kg/m².  Wt Readings from Last 3 Encounters:   05/02/25 57.2 kg (126 lb)   05/01/25 55.8 kg (123 lb)   05/01/25 55.8 kg (123 lb)     Physical Exam:   There were no vitals filed for this visit.    General Appearance:    Alert, cooperative, no distress, appears stated age   Head:    Normocephalic, without obvious abnormality, atraumatic   Eyes:    conjunctiva/corneas clear, both eyes        Nose:   Nares normal, septum midline, no drainage    Throat:   Lips normal; teeth and gums normal   Neck:    symmetrical, trachea midline, ;     thyroid:  no enlargement/   Back:     Symmetric, no curvature, ROM normal   Lungs:   No audible wheezing or labored breathing   Chest Wall:    No tenderness or deformity    Heart:    Regular rate and rhythm               Pulses:   2+ and symmetric all extremities   Skin:   Skin color, texture, turgor normal, no rashes or lesions   Neurologic:   normal strength, sensation and reflexes     throughout       Musculoskeletal: right lower extremity  On examination of the right knee there is no effusion, no erythema.  Range of motion to full active  extension and flexion to greater than 120°.  Pain on palpation medial and lateral joint lines.  There is crepitus with range of motion, no warmth to palpation, bony enlargement noted. No pain on palpation pes anserine bursa region or distal iliotibial band.  Stable to varus and valgus stress without pain or gapping.  Negative anterior and posterior drawer testing.  Sensation intact distal pulses present.    Radiology:   I personally reviewed the films.  X-rays right knee shows severe medial joint space osteochondrosis with osteophyte formation with medial severe right knee osteoarthritis    Large joint arthrocentesis: R knee  Universal Protocol:  Consent: Verbal consent obtained.  Consent given by: patient  Patient understanding: patient states understanding of the procedure being performed  Site marked: the operative site was marked  Patient identity confirmed: verbally with patient  Supporting Documentation  Indications: pain     Is this a Visco injection? NoProcedure Details  Location: knee - R knee  Needle size: 22 G  Ultrasound guidance: no  Approach: anterolateral  Medications administered: 2 mL bupivacaine 0.25 %; 2 mL methylPREDNISolone acetate 40 mg/mL; 30 mg ketorolac 30 mg/mL    Patient tolerance: patient tolerated the procedure well with no immediate complications                         .

## 2025-05-04 ENCOUNTER — RESULTS FOLLOW-UP (OUTPATIENT)
Dept: FAMILY MEDICINE CLINIC | Facility: CLINIC | Age: 80
End: 2025-05-04

## 2025-05-12 ENCOUNTER — TELEPHONE (OUTPATIENT)
Dept: OBGYN CLINIC | Facility: HOSPITAL | Age: 80
End: 2025-05-12

## 2025-05-12 NOTE — TELEPHONE ENCOUNTER
Caller: [t     Doctor: Dr. Storey     Reason for call: seen for the R knee and received a csi. Yesterday, she noticed a small bump forming on the knee, believed it is fluid. States her knee also gave out twice yesterday. Today feeling a bit better after taking advil and Voltaren gel on the knee.     Please advise     Call back#: Phone:   [phone]

## 2025-05-13 NOTE — TELEPHONE ENCOUNTER
Call re echo normal except for one of her valves tricuspid valve-mild floppiness or regurgitation. This would not cause any symptoms  no additional testing needed at this time.   She has a number of extra beats on her recent stress test. Treatment for this a beta blocker. She is currently on a beta blocker Metoprolol  JJM       Left Ventricle: Left ventricular cavity size is normal. Wall thickness is normal. The left ventricular ejection fraction is 55%. Systolic function is normal. Diastolic function is mildly abnormal, consistent with grade I (abnormal) relaxation.    The following segments are hypokinetic: basal inferior, basal inferolateral and mid inferolateral.    All other segments are normal.    Left Atrium: The atrium is mildly dilated.    Tricuspid Valve: There is mild regurgitation. The estimated right ventricular systolic pressure is 33.00 mmHg.      Current Outpatient Medications:     acetaminophen (TYLENOL) 500 mg tablet, Take 500 mg by mouth every 6 (six) hours as needed for mild pain, Disp: , Rfl:     amLODIPine (NORVASC) 5 mg tablet, take 1 tablet by mouth every evening, Disp: 90 tablet, Rfl: 1    atorvastatin (LIPITOR) 40 mg tablet, take 1 tablet by mouth once daily, Disp: 90 tablet, Rfl: 1    cholecalciferol (VITAMIN D3) 1,000 units tablet, Take 2,000 Units by mouth daily, Disp: , Rfl:     clopidogrel (PLAVIX) 75 mg tablet, take 1 tablet by mouth once daily, Disp: 90 tablet, Rfl: 1    metFORMIN (GLUCOPHAGE-XR) 500 mg 24 hr tablet, take 2 tablets by mouth daily with dinner, Disp: 180 tablet, Rfl: 1    metoprolol succinate (TOPROL-XL) 50 mg 24 hr tablet, Take 1 tablet (50 mg total) by mouth daily, Disp: 90 tablet, Rfl: 1    Multiple Vitamin (MULTIVITAMIN) tablet, Take 1 tablet by mouth daily, Disp: , Rfl:     oxybutynin (DITROPAN-XL) 5 mg 24 hr tablet, take 1 tablet by mouth once daily, Disp: 90 tablet, Rfl: 1

## 2025-06-03 ENCOUNTER — OFFICE VISIT (OUTPATIENT)
Dept: FAMILY MEDICINE CLINIC | Facility: CLINIC | Age: 80
End: 2025-06-03
Payer: COMMERCIAL

## 2025-06-03 VITALS
DIASTOLIC BLOOD PRESSURE: 82 MMHG | RESPIRATION RATE: 16 BRPM | TEMPERATURE: 97 F | BODY MASS INDEX: 27.27 KG/M2 | WEIGHT: 126 LBS | HEART RATE: 81 BPM | SYSTOLIC BLOOD PRESSURE: 138 MMHG | OXYGEN SATURATION: 97 %

## 2025-06-03 DIAGNOSIS — I10 ESSENTIAL HYPERTENSION: ICD-10-CM

## 2025-06-03 DIAGNOSIS — J06.9 UPPER RESPIRATORY TRACT INFECTION, UNSPECIFIED TYPE: Primary | ICD-10-CM

## 2025-06-03 PROCEDURE — 99213 OFFICE O/P EST LOW 20 MIN: CPT | Performed by: FAMILY MEDICINE

## 2025-06-03 PROCEDURE — G2211 COMPLEX E/M VISIT ADD ON: HCPCS | Performed by: FAMILY MEDICINE

## 2025-06-03 RX ORDER — AZITHROMYCIN 250 MG/1
TABLET, FILM COATED ORAL
Qty: 6 TABLET | Refills: 0 | Status: SHIPPED | OUTPATIENT
Start: 2025-06-03 | End: 2025-06-08

## 2025-06-04 ENCOUNTER — TELEPHONE (OUTPATIENT)
Age: 80
End: 2025-06-04

## 2025-06-04 RX ORDER — METOPROLOL SUCCINATE 50 MG/1
50 TABLET, EXTENDED RELEASE ORAL DAILY
Qty: 90 TABLET | Refills: 1 | Status: SHIPPED | OUTPATIENT
Start: 2025-06-04

## 2025-06-04 NOTE — TELEPHONE ENCOUNTER
Patient was seen in the office yesterday, for URI symptoms.  She was given an prescription for antibiotics, but told not to take them, unless her symptoms worsen.    She calls in today, stating that her nasal congestion is worse.  She is coughing and bringing up a large amount of green sputum.  She is also endorsing a headache.  Denies fever, shortness of breath or chest pain.      She has been using Mucinex and tylenol without relief.      She would like to know, if Dr. Fairbanks feels, she should start the antibiotic?  Should she take anything else?      Home Care Advice Provided:     Preferred Pharmacy:   [id number] - [address] [place] [address]

## 2025-06-04 NOTE — TELEPHONE ENCOUNTER
Patient is calling back, regarding previous message and update.    Patient states that her temperature is now 100.9.      She states that Dr. Fairbanks, gave her the prescription and told her to take it if she feels worse.  Patient states that she feels worse.  Advised patient ok to start antibiotic prescribed.      Please advise and further recommendations.

## 2025-06-17 DIAGNOSIS — I63.81 LACUNAR INFARCTION (HCC): ICD-10-CM

## 2025-06-17 RX ORDER — CLOPIDOGREL BISULFATE 75 MG/1
75 TABLET ORAL DAILY
Qty: 90 TABLET | Refills: 1 | Status: SHIPPED | OUTPATIENT
Start: 2025-06-17

## 2025-06-18 ENCOUNTER — OFFICE VISIT (OUTPATIENT)
Dept: FAMILY MEDICINE CLINIC | Facility: CLINIC | Age: 80
End: 2025-06-18
Payer: COMMERCIAL

## 2025-06-18 VITALS
TEMPERATURE: 97.8 F | BODY MASS INDEX: 26.97 KG/M2 | DIASTOLIC BLOOD PRESSURE: 74 MMHG | WEIGHT: 125 LBS | SYSTOLIC BLOOD PRESSURE: 118 MMHG | HEART RATE: 72 BPM | HEIGHT: 57 IN | OXYGEN SATURATION: 97 % | RESPIRATION RATE: 18 BRPM

## 2025-06-18 DIAGNOSIS — K21.9 GASTROESOPHAGEAL REFLUX DISEASE WITHOUT ESOPHAGITIS: Primary | ICD-10-CM

## 2025-06-18 PROCEDURE — 99213 OFFICE O/P EST LOW 20 MIN: CPT

## 2025-06-18 PROCEDURE — G2211 COMPLEX E/M VISIT ADD ON: HCPCS

## 2025-06-18 RX ORDER — PANTOPRAZOLE SODIUM 40 MG/1
40 TABLET, DELAYED RELEASE ORAL DAILY
COMMUNITY

## 2025-06-18 NOTE — PROGRESS NOTES
"Name: Nicole Bledsoe      : 1945      MRN: [id number]  Encounter Provider: Wilmer Rosen DO  Encounter Date: 2025   Encounter department: [facility]  :  Assessment & Plan  Gastroesophageal reflux disease without esophagitis  Reports episode of burning sensation in chest after lying down last night after eating cookies and drinking milk episode eventually self resolved.  Does have a lingering cough from URI few weeks ago.  Denies any chest pain, shortness of breath, palpitations.  Has not had any further episodes.  At this time, continue to monitor symptoms and recommend waiting at least 30 minutes prior to lying down after meal/food.   Continue supportive care for cough, discussed that they can linger for up to 8 weeks after URI.  Follow-up as needed or if worsening symptoms                History of Present Illness   HPI    Reports burning sensation in chest when she lay down yesterday after eating a cookie and some milk.  Does have a history of reflux and states that she is on pantoprazole 40 mg daily.  Also has a slight lingering cough from URI a few weeks ago but denies now picking anything up.  Denies any chest pain, shortness of breath, palpitations, costochondral pain.  Recently had pretty comprehensive cardiac workup with echo, stress test and they were unremarkable.          Review of Systems   Gastrointestinal:         Burning sensation in chest after lying down       Objective   /74 (BP Location: Left arm, Patient Position: Sitting, Cuff Size: Standard)   Pulse 72   Temp 97.8 °F (36.6 °C) (Temporal)   Resp 18   Ht 4' 9\" (1.448 m)   Wt 56.7 kg (125 lb)   SpO2 97%   BMI 27.05 kg/m²      Physical Exam  Vitals reviewed.   Constitutional:       Appearance: Normal appearance.   HENT:      Head: Normocephalic and atraumatic.      Right Ear: External ear normal.      Left Ear: External ear normal.      Nose: Nose normal.      Mouth/Throat:      Pharynx: " Oropharynx is clear.     Eyes:      Extraocular Movements: Extraocular movements intact.      Conjunctiva/sclera: Conjunctivae normal.       Cardiovascular:      Rate and Rhythm: Normal rate and regular rhythm.      Pulses: Normal pulses.      Heart sounds: Normal heart sounds.   Pulmonary:      Effort: Pulmonary effort is normal. No respiratory distress.      Breath sounds: Normal breath sounds. No wheezing, rhonchi or rales.   Abdominal:      Palpations: Abdomen is soft.     Musculoskeletal:      Cervical back: Neck supple.     Skin:     General: Skin is warm.     Neurological:      Mental Status: She is alert and oriented to person, place, and time.     Psychiatric:         Mood and Affect: Mood normal.         Behavior: Behavior normal.         Thought Content: Thought content normal.         Judgment: Judgment normal.

## 2025-06-18 NOTE — ASSESSMENT & PLAN NOTE
Reports episode of burning sensation in chest after lying down last night after eating cookies and drinking milk episode eventually self resolved.  Does have a lingering cough from URI few weeks ago.  Denies any chest pain, shortness of breath, palpitations.  Has not had any further episodes.  At this time, continue to monitor symptoms and recommend waiting at least 30 minutes prior to lying down after meal/food.   Continue supportive care for cough, discussed that they can linger for up to 8 weeks after URI.  Follow-up as needed or if worsening symptoms

## 2025-06-22 DIAGNOSIS — N32.81 OAB (OVERACTIVE BLADDER): ICD-10-CM

## 2025-06-22 RX ORDER — OXYBUTYNIN CHLORIDE 5 MG/1
5 TABLET, EXTENDED RELEASE ORAL DAILY
Qty: 90 TABLET | Refills: 1 | Status: SHIPPED | OUTPATIENT
Start: 2025-06-22

## 2025-07-02 ENCOUNTER — RA CDI HCC (OUTPATIENT)
Dept: OTHER | Facility: HOSPITAL | Age: 80
End: 2025-07-02

## 2025-07-10 ENCOUNTER — APPOINTMENT (OUTPATIENT)
Dept: LAB | Facility: CLINIC | Age: 80
End: 2025-07-10
Payer: COMMERCIAL

## 2025-07-10 DIAGNOSIS — E11.29 TYPE 2 DIABETES MELLITUS WITH MICROALBUMINURIA, WITHOUT LONG-TERM CURRENT USE OF INSULIN (HCC): ICD-10-CM

## 2025-07-10 DIAGNOSIS — R80.9 TYPE 2 DIABETES MELLITUS WITH MICROALBUMINURIA, WITHOUT LONG-TERM CURRENT USE OF INSULIN (HCC): ICD-10-CM

## 2025-07-10 LAB
ALBUMIN SERPL BCG-MCNC: 4.4 G/DL (ref 3.5–5)
ALP SERPL-CCNC: 68 U/L (ref 34–104)
ALT SERPL W P-5'-P-CCNC: 16 U/L (ref 7–52)
ANION GAP SERPL CALCULATED.3IONS-SCNC: 13 MMOL/L (ref 4–13)
AST SERPL W P-5'-P-CCNC: 24 U/L (ref 13–39)
BASOPHILS # BLD AUTO: 0.05 THOUSANDS/ÂΜL (ref 0–0.1)
BASOPHILS NFR BLD AUTO: 1 % (ref 0–1)
BILIRUB SERPL-MCNC: 0.72 MG/DL (ref 0.2–1)
BUN SERPL-MCNC: 13 MG/DL (ref 5–25)
CALCIUM SERPL-MCNC: 9.5 MG/DL (ref 8.4–10.2)
CHLORIDE SERPL-SCNC: 104 MMOL/L (ref 96–108)
CHOLEST SERPL-MCNC: 161 MG/DL (ref ?–200)
CO2 SERPL-SCNC: 24 MMOL/L (ref 21–32)
CREAT SERPL-MCNC: 0.67 MG/DL (ref 0.6–1.3)
CREAT UR-MCNC: 149.4 MG/DL
EOSINOPHIL # BLD AUTO: 0.2 THOUSAND/ÂΜL (ref 0–0.61)
EOSINOPHIL NFR BLD AUTO: 3 % (ref 0–6)
ERYTHROCYTE [DISTWIDTH] IN BLOOD BY AUTOMATED COUNT: 14.3 % (ref 11.6–15.1)
EST. AVERAGE GLUCOSE BLD GHB EST-MCNC: 131 MG/DL
GFR SERPL CREATININE-BSD FRML MDRD: 83 ML/MIN/1.73SQ M
GLUCOSE P FAST SERPL-MCNC: 116 MG/DL (ref 65–99)
HBA1C MFR BLD: 6.2 %
HCT VFR BLD AUTO: 39 % (ref 34.8–46.1)
HDLC SERPL-MCNC: 45 MG/DL
HGB BLD-MCNC: 12.8 G/DL (ref 11.5–15.4)
IMM GRANULOCYTES # BLD AUTO: 0.02 THOUSAND/UL (ref 0–0.2)
IMM GRANULOCYTES NFR BLD AUTO: 0 % (ref 0–2)
LDLC SERPL CALC-MCNC: 94 MG/DL (ref 0–100)
LYMPHOCYTES # BLD AUTO: 3.19 THOUSANDS/ÂΜL (ref 0.6–4.47)
LYMPHOCYTES NFR BLD AUTO: 48 % (ref 14–44)
MCH RBC QN AUTO: 29.4 PG (ref 26.8–34.3)
MCHC RBC AUTO-ENTMCNC: 32.8 G/DL (ref 31.4–37.4)
MCV RBC AUTO: 89 FL (ref 82–98)
MICROALBUMIN UR-MCNC: 37.8 MG/L
MICROALBUMIN/CREAT 24H UR: 25 MG/G CREATININE (ref 0–30)
MONOCYTES # BLD AUTO: 0.63 THOUSAND/ÂΜL (ref 0.17–1.22)
MONOCYTES NFR BLD AUTO: 9 % (ref 4–12)
NEUTROPHILS # BLD AUTO: 2.59 THOUSANDS/ÂΜL (ref 1.85–7.62)
NEUTS SEG NFR BLD AUTO: 39 % (ref 43–75)
NONHDLC SERPL-MCNC: 116 MG/DL
NRBC BLD AUTO-RTO: 0 /100 WBCS
PLATELET # BLD AUTO: 220 THOUSANDS/UL (ref 149–390)
PMV BLD AUTO: 12.3 FL (ref 8.9–12.7)
POTASSIUM SERPL-SCNC: 4.1 MMOL/L (ref 3.5–5.3)
PROT SERPL-MCNC: 7.3 G/DL (ref 6.4–8.4)
RBC # BLD AUTO: 4.36 MILLION/UL (ref 3.81–5.12)
SODIUM SERPL-SCNC: 141 MMOL/L (ref 135–147)
TRIGL SERPL-MCNC: 109 MG/DL (ref ?–150)
WBC # BLD AUTO: 6.68 THOUSAND/UL (ref 4.31–10.16)

## 2025-07-10 PROCEDURE — 82570 ASSAY OF URINE CREATININE: CPT

## 2025-07-10 PROCEDURE — 82043 UR ALBUMIN QUANTITATIVE: CPT

## 2025-07-15 ENCOUNTER — OFFICE VISIT (OUTPATIENT)
Dept: FAMILY MEDICINE CLINIC | Facility: CLINIC | Age: 80
End: 2025-07-15
Payer: COMMERCIAL

## 2025-07-15 VITALS
OXYGEN SATURATION: 97 % | HEART RATE: 84 BPM | WEIGHT: 123 LBS | BODY MASS INDEX: 26.54 KG/M2 | TEMPERATURE: 97.6 F | RESPIRATION RATE: 18 BRPM | DIASTOLIC BLOOD PRESSURE: 74 MMHG | HEIGHT: 57 IN | SYSTOLIC BLOOD PRESSURE: 130 MMHG

## 2025-07-15 DIAGNOSIS — K21.9 GASTROESOPHAGEAL REFLUX DISEASE WITHOUT ESOPHAGITIS: ICD-10-CM

## 2025-07-15 DIAGNOSIS — I10 ESSENTIAL HYPERTENSION: Primary | ICD-10-CM

## 2025-07-15 DIAGNOSIS — E11.29 TYPE 2 DIABETES MELLITUS WITH MICROALBUMINURIA, WITHOUT LONG-TERM CURRENT USE OF INSULIN (HCC): ICD-10-CM

## 2025-07-15 DIAGNOSIS — R80.9 TYPE 2 DIABETES MELLITUS WITH MICROALBUMINURIA, WITHOUT LONG-TERM CURRENT USE OF INSULIN (HCC): ICD-10-CM

## 2025-07-15 DIAGNOSIS — N32.81 OAB (OVERACTIVE BLADDER): ICD-10-CM

## 2025-07-15 DIAGNOSIS — R09.82 POST-NASAL DRIP: ICD-10-CM

## 2025-07-15 PROCEDURE — 99214 OFFICE O/P EST MOD 30 MIN: CPT

## 2025-07-15 PROCEDURE — G2211 COMPLEX E/M VISIT ADD ON: HCPCS

## 2025-07-17 ENCOUNTER — TELEPHONE (OUTPATIENT)
Age: 80
End: 2025-07-17

## 2025-07-17 NOTE — TELEPHONE ENCOUNTER
Patient did not have a diabetic eye exam at last visit Please contact    Dr. Kanika Jules office   [address], [address], [place], [address] [phone]    She may have had one there.

## (undated) DEVICE — ADHESIVE SKN CLSR HISTOACRYL FLEX 0.5ML LF

## (undated) DEVICE — CHLORAPREP HI-LITE 26ML ORANGE

## (undated) DEVICE — 2.5MM DRILL BIT/QC/GOLD/110MM

## (undated) DEVICE — TUBING SUCTION 5MM X 12 FT

## (undated) DEVICE — PADDING CAST 4 IN  COTTON STRL

## (undated) DEVICE — GLOVE SRG BIOGEL 8.5

## (undated) DEVICE — 4.0MM CANCELLOUS BONE SCREW PARTIALLY THREADED/18MM
Type: IMPLANTABLE DEVICE | Status: NON-FUNCTIONAL
Removed: 2018-10-18

## (undated) DEVICE — SUT ETHILON 3-0 PS-1 18 IN 1663G

## (undated) DEVICE — PAD CAST 4 IN COTTON NON STERILE

## (undated) DEVICE — CURITY NON-ADHERENT STRIPS: Brand: CURITY

## (undated) DEVICE — COBAN 4 IN STERILE

## (undated) DEVICE — 2.7MM THREE-FLUTED DRILL BIT QC/125MM

## (undated) DEVICE — LIGHT HANDLE COVER SLEEVE DISP BLUE STELLAR

## (undated) DEVICE — GAUZE SPONGES,16 PLY: Brand: CURITY

## (undated) DEVICE — SCD SEQUENTIAL COMPRESSION COMFORT SLEEVE MEDIUM KNEE LENGTH: Brand: KENDALL SCD

## (undated) DEVICE — INTENDED FOR TISSUE SEPARATION, AND OTHER PROCEDURES THAT REQUIRE A SHARP SURGICAL BLADE TO PUNCTURE OR CUT.: Brand: BARD-PARKER SAFETY BLADES SIZE 15, STERILE

## (undated) DEVICE — BETHLEHEM UNIVERSAL  MIONR EXT: Brand: CARDINAL HEALTH

## (undated) DEVICE — ACE WRAP 4 IN UNSTERILE

## (undated) DEVICE — SUT VICRYL 0 CT-2 18 IN J727D

## (undated) DEVICE — BANDAGE, ESMARK LF STR 6"X9' (20/CS): Brand: CYPRESS

## (undated) DEVICE — 2.0MM DRILL BIT/QC/125MM

## (undated) DEVICE — REM POLYHESIVE ADULT PATIENT RETURN ELECTRODE: Brand: VALLEYLAB

## (undated) DEVICE — DRAPE C-ARM X-RAY

## (undated) DEVICE — KERLIX BANDAGE ROLL: Brand: KERLIX

## (undated) DEVICE — 3M™ STERI-STRIP™ REINFORCED ADHESIVE SKIN CLOSURES, R1547, 1/2 IN X 4 IN (12 MM X 100 MM), 6 STRIPS/ENVELOPE: Brand: 3M™ STERI-STRIP™

## (undated) DEVICE — GLOVE INDICATOR PI UNDERGLOVE SZ 8.5 BLUE